# Patient Record
Sex: MALE | Race: WHITE | Employment: OTHER | URBAN - METROPOLITAN AREA
[De-identification: names, ages, dates, MRNs, and addresses within clinical notes are randomized per-mention and may not be internally consistent; named-entity substitution may affect disease eponyms.]

---

## 2017-02-27 RX ORDER — EZETIMIBE 10 MG
TABLET ORAL
Qty: 30 TAB | Refills: 2 | Status: SHIPPED | OUTPATIENT
Start: 2017-02-27 | End: 2017-05-29 | Stop reason: SDUPTHER

## 2017-03-28 RX ORDER — NITROGLYCERIN 40 MG/1
1 PATCH TRANSDERMAL DAILY
Qty: 30 PATCH | Refills: 3 | Status: SHIPPED | OUTPATIENT
Start: 2017-03-28 | End: 2017-08-01 | Stop reason: SDUPTHER

## 2017-05-30 RX ORDER — EZETIMIBE 10 MG/1
TABLET ORAL
Qty: 30 TAB | Refills: 2 | Status: SHIPPED | OUTPATIENT
Start: 2017-05-30 | End: 2017-08-23 | Stop reason: SDUPTHER

## 2017-06-20 ENCOUNTER — HOSPITAL ENCOUNTER (OUTPATIENT)
Dept: ULTRASOUND IMAGING | Age: 82
Discharge: HOME | End: 2017-06-20
Attending: PHYSICIAN ASSISTANT
Payer: MEDICARE

## 2017-06-20 DIAGNOSIS — M79.89 SWELLING OF LIMB: ICD-10-CM

## 2017-06-20 PROCEDURE — 93971 EXTREMITY STUDY: CPT

## 2017-07-31 ENCOUNTER — TELEPHONE (OUTPATIENT)
Dept: CARDIOLOGY CLINIC | Age: 82
End: 2017-07-31

## 2017-07-31 NOTE — TELEPHONE ENCOUNTER
Spoke to patient using 2 patient identifiers. Pt called to request refill for nitro patch. He was last seen on 2/29/17. Will ask  to call pt for an appt, then refills.

## 2017-08-01 RX ORDER — NITROGLYCERIN 40 MG/1
1 PATCH TRANSDERMAL DAILY
Qty: 30 PATCH | Refills: 0 | Status: SHIPPED | OUTPATIENT
Start: 2017-08-01 | End: 2017-08-30 | Stop reason: SDUPTHER

## 2017-08-21 ENCOUNTER — OFFICE VISIT (OUTPATIENT)
Dept: CARDIOLOGY CLINIC | Age: 82
End: 2017-08-21

## 2017-08-21 VITALS
RESPIRATION RATE: 16 BRPM | DIASTOLIC BLOOD PRESSURE: 80 MMHG | WEIGHT: 173.2 LBS | OXYGEN SATURATION: 97 % | HEIGHT: 62 IN | BODY MASS INDEX: 31.87 KG/M2 | HEART RATE: 64 BPM | SYSTOLIC BLOOD PRESSURE: 130 MMHG

## 2017-08-21 DIAGNOSIS — I25.119 ATHEROSCLEROSIS OF NATIVE CORONARY ARTERY OF NATIVE HEART WITH ANGINA PECTORIS (HCC): ICD-10-CM

## 2017-08-21 DIAGNOSIS — I10 ESSENTIAL HYPERTENSION, BENIGN: ICD-10-CM

## 2017-08-21 DIAGNOSIS — E78.2 MIXED HYPERLIPIDEMIA: Primary | ICD-10-CM

## 2017-08-21 NOTE — PROGRESS NOTES
NAME:  Robe Marrero. :   1929   MRN:   [id number]   PCP:  Marisa Tiwari MD           Subjective: The patient is a 80y.o. year old male  who returns for a routine follow-upon CAD . Since the last visit, patient reports no change in exercise tolerance, chest pain, edema, medication intolerance, palpitations, shortness of breath, PND/orthopnea wheezing, sputum, syncope, dizziness or light headedness. Doing well cardiac wise. Admits to feeling \"hot sensation\" at any time. Past Medical History:   Diagnosis Date    Arthritis     SHOULDERS AND SPINE    CAD (coronary artery disease) 1998    MI    Cancer [facility])     chronic lymphocytic leukemia    Cancer ([facility]     multiple skin cancers     Chest pain, unspecified     Chronic pain     L 2 LYTIC LESION    Edema     Hypertension     MI (myocardial infarction) ([facility]     Postsurgical percutaneous transluminal coronary angioplasty status 2012    Pre-operative cardiovascular examination     Thyroid disease        Social History   Substance Use Topics    Smoking status: Former Smoker     Packs/day: 0.50     Years: 15.00     Quit date: 1974    Smokeless tobacco: Never Used    Alcohol use Yes      Comment: OCC. Family History   Problem Relation Age of Onset    Diabetes Mother     Heart Disease Father     Heart Attack Father 80    Lung Disease Sister      PULMONARY FIBROSIS    Heart Attack Brother     No Known Problems Sister     Anesth Problems Neg Hx         Review of Systems  Constitutional: Negative for fever, chills, and diaphoresis. Respiratory: Negative for cough, hemoptysis, sputum production, shortness of breath and wheezing. Cardiovascular: Negative for chest pain, palpitations, orthopnea, claudication, leg swelling and PND. Gastrointestinal: Negative for heartburn, nausea, vomiting, blood in stool and melena. Genitourinary: Negative for dysuria and flank pain.    Musculoskeletal: Negative for joint pain and back pain. Skin: Negative for rash. Neurological: Negative for focal weakness, seizures, loss of consciousness, weakness and headaches. Endo/Heme/Allergies: Does not bruise/bleed easily. Psychiatric/Behavioral: Negative for memory loss. The patient does not have insomnia. Objective:       Vitals:    08/21/17 1426 08/21/17 1427   BP: 140/70 130/80   Pulse: 64    Resp: 16    SpO2: 97%    Weight: 173 lb 3.2 oz (78.6 kg)    Height: 5' 2\" (1.575 m)     Body mass index is 31.68 kg/(m^2). General PE    Gen: NAD     Mental Status - Alert. General Appearance - Not in acute distress. Neck - no JVD     Chest and Lung Exam     Inspection: Accessory muscles - No use of accessory muscles in breathing. Auscultation:   Breath sounds: - Normal.     Cardiovascular   Inspection: Jugular vein - Bilateral - Inspection Normal.   Palpation/Percussion:   Apical Impulse: - Normal.   Auscultation: Rhythm - Regular. Heart Sounds - S1 WNL and S2 WNL. No S3 or S4. Murmurs & Other Heart Sounds: Auscultation of the heart reveals - No Murmurs. Peripheral Vascular   Upper Extremity: Inspection - Bilateral - No Cyanotic nailbeds or Digital clubbing. Lower Extremity:   Palpation: Edema - Bilateral - No edema. Abdomen: Soft, non-tender, bowel sounds are active. Neuro: A&O times 3, CN and motor grossly WNL      Data Review:     EKG -  Sinus  Rhythm   -Poor R-wave progression -nonspecific unchanged from previous. Allergies reviewed  Allergies   Allergen Reactions    Statins-Hmg-Coa Reductase Inhibitors Myalgia and Other (comments)     Causes aching, generalized       Medications reviewed  Current Outpatient Prescriptions   Medication Sig    nitroglycerin (NITRODUR) 0.2 mg/hr 1 Patch by TransDERmal route daily.  ezetimibe (ZETIA) 10 mg tablet TAKE ONE TABLET BY MOUTH AT NIGHT    bacitracin (BACITRACIN) ointment Apply  to affected area two (2) times a day.     amLODIPine (NORVASC) 2.5 mg tablet Take 2.5 mg by mouth daily.  lisinopril (PRINIVIL, ZESTRIL) 10 mg tablet Take 10 mg by mouth daily.  cholecalciferol (VITAMIN D3) 1,000 unit tablet Take 1,000 Units by mouth daily.  timolol (TIMOPTIC) 0.5 % ophthalmic solution Administer 1 Drop to both eyes two (2) times a day.  HYDROcodone-acetaminophen (NORCO) 5-325 mg per tablet Take 1 Tab by mouth every six (6) hours as needed for Pain.  bimatoprost (LUMIGAN) 0.03 % ophthalmic drops Administer 1 Drop to both eyes nightly.  acetaminophen (TYLENOL EXTRA STRENGTH) 500 mg tablet Take  by mouth every six (6) hours as needed for Pain.  brinzolamide-brimonidine (SIMBRINZA) 1-0.2 % drps Apply 1 Drop to eye two (2) times a day.  finasteride (PROSCAR) 5 mg tablet Take 5 mg by mouth daily.  silodosin (RAPAFLO) 8 mg capsule Take 8 mg by mouth nightly.  levothyroxine (SYNTHROID) 25 mcg tablet Take 25 mcg by mouth Daily (before breakfast).  magnesium oxide (MAG-OXIDE) 400 mg tablet Take 400 mg by mouth daily (with breakfast).  aspirin delayed-release 81 mg tablet Take 81 mg by mouth nightly.  omega-3 fatty acids-vitamin e (FISH OIL) 1,000 mg cap Take 1 Cap by mouth two (2) times a day.  co-enzyme Q-10 (CO Q-10) 100 mg capsule Take 100 mg by mouth daily. No current facility-administered medications for this visit. Assessment:       ICD-10-CM ICD-9-CM    1. Mixed hyperlipidemia E78.2 272.2 AMB POC EKG ROUTINE W/ 12 LEADS, INTER & REP      METABOLIC PANEL, COMPREHENSIVE      LIPID PANEL      CK   2. Essential hypertension, benign A86 385.8 METABOLIC PANEL, COMPREHENSIVE      LIPID PANEL      CK   3.  Atherosclerosis of native coronary artery of native heart with angina pectoris ([facility] F53.710 168.76 METABOLIC PANEL, COMPREHENSIVE     413.9 LIPID PANEL      CK        Orders Placed This Encounter    METABOLIC PANEL, COMPREHENSIVE    LIPID PANEL    CK    AMB POC EKG ROUTINE W/ 12 LEADS, INTER & REP     Order Specific Question:   Reason for Exam:     Answer:   routine       Patient Active Problem List   Diagnosis Code    Postsurgical percutaneous transluminal coronary angioplasty status Z98.61    Mixed hyperlipidemia E78.2    Essential hypertension, benign I10    Coronary atherosclerosis of native coronary artery I25.10    Pneumonia, organism unspecified J18.9    Temporal arteritis ([facility] M31.6       Plan:     Patient presents for follow up, feeling well and stable from cardiac standpoint. Continue current care and follow up in 6 mo.     Wilbur Blizzard, MD

## 2017-08-21 NOTE — MR AVS SNAPSHOT
Visit Information Date & Time Provider Department Dept. Phone Encounter #  
 8/21/2017  2:15 PM Scar Montana, [facility] [id number] Upcoming Health Maintenance Date Due DTaP/Tdap/Td series (1 - Tdap) 1/11/1950 ZOSTER VACCINE AGE 60> 11/11/1988 GLAUCOMA SCREENING Q2Y 1/11/1994 MEDICARE YEARLY EXAM 1/11/1994 Pneumococcal 65+ Low/Medium Risk (2 of 2 - PPSV23) 1/1/2008 INFLUENZA AGE 9 TO ADULT 8/1/2017 Allergies as of 8/21/2017  Review Complete On: 8/21/2017 By: Kasey Villareal LPN Severity Noted Reaction Type Reactions Statins-hmg-coa Reductase Inhibitors Medium 04/15/2010   Side Effect Myalgia, Other (comments) Causes aching, generalized Current Immunizations  Reviewed on 3/27/2013 Name Date H1N1 FLU VACCINE 11/2/2009 Influenza Vaccine  Deferred (Patient Refused) Influenza Vaccine Whole 12/2/2009 Pneumococcal Vaccine (Unspecified Type) 1/1/2003 Not reviewed this visit You Were Diagnosed With   
  
 Codes Comments Mixed hyperlipidemia    -  Primary ICD-10-CM: Q64.6 ICD-9-CM: 272.2 Essential hypertension, benign     ICD-10-CM: I10 
ICD-9-CM: 401.1 Atherosclerosis of native coronary artery of native heart with angina pectoris ([facility]     ICD-10-CM: I25.119 ICD-9-CM: 414.01, 413.9 Vitals BP Pulse Resp Height(growth percentile) Weight(growth percentile) SpO2  
 130/80 (BP 1 Location: Right arm, BP Patient Position: Sitting) 64 16 5' 2\" (1.575 m) 173 lb 3.2 oz (78.6 kg) 97% BMI Smoking Status 31.68 kg/m2 Former Smoker Vitals History BMI and BSA Data Body Mass Index Body Surface Area  
 31.68 kg/m 2 1.85 m 2 Preferred Pharmacy Pharmacy Name Phone [facility] [address] [facility] - [address] , [place] [address] [phone] Your Updated Medication List  
  
   
 This list is accurate as of: 8/21/17  2:56 PM.  Always use your most recent med list. amLODIPine 2.5 mg tablet Commonly known as:  Fely Boothe Take 2.5 mg by mouth daily. aspirin delayed-release 81 mg tablet Take 81 mg by mouth nightly. bacitracin zinc ointment Commonly known as:  BACITRACIN Apply  to affected area two (2) times a day. co-enzyme Q-10 100 mg capsule Commonly known as:  CO Q-10 Take 100 mg by mouth daily. ezetimibe 10 mg tablet Commonly known as:  Corinda [place] TAKE ONE TABLET BY MOUTH AT NIGHT  
  
 finasteride 5 mg tablet Commonly known as:  PROSCAR Take 5 mg by mouth daily. FISH OIL 1,000 mg Cap Generic drug:  omega-3 fatty acids-vitamin e Take 1 Cap by mouth two (2) times a day. levothyroxine 25 mcg tablet Commonly known as:  SYNTHROID Take 25 mcg by mouth Daily (before breakfast). lisinopril 10 mg tablet Commonly known as:  Mando Luisito Take 10 mg by mouth daily. LUMIGAN 0.03 % ophthalmic drops Generic drug:  bimatoprost  
Administer 1 Drop to both eyes nightly. MAG-OXIDE 400 mg tablet Generic drug:  magnesium oxide Take 400 mg by mouth daily (with breakfast). nitroglycerin 0.2 mg/hr Commonly known as:  NITRODUR  
1 Patch by TransDERmal route daily. NORCO 5-325 mg per tablet Generic drug:  HYDROcodone-acetaminophen Take 1 Tab by mouth every six (6) hours as needed for Pain. RAPAFLO 8 mg capsule Generic drug:  silodosin Take 8 mg by mouth nightly. SIMBRINZA 1-0.2 % Drps Generic drug:  brinzolamide-brimonidine Apply 1 Drop to eye two (2) times a day. timolol 0.5 % ophthalmic solution Commonly known as:  TIMOPTIC Administer 1 Drop to both eyes two (2) times a day. TYLENOL EXTRA STRENGTH 500 mg tablet Generic drug:  acetaminophen Take  by mouth every six (6) hours as needed for Pain. VITAMIN D3 1,000 unit tablet Generic drug:  cholecalciferol Take 1,000 Units by mouth daily. We Performed the Following AMB POC EKG ROUTINE W/ 12 LEADS, INTER & REP [16740 CPT(R)] CK [id number] CPT(R)] LIPID PANEL [82859 CPT(R)] METABOLIC PANEL, COMPREHENSIVE [65454 CPT(R)] Introducing [facility]! Natali Epstein introduces Pocket patient portal. Now you can access parts of your medical record, email your doctor's office, and request medication refills online. 1. In your internet browser, go to [email or web address] 2. Click on the First Time User? Click Here link in the Sign In box. You will see the New Member Sign Up page. 3. Enter your Pocket Access Code exactly as it appears below. You will not need to use this code after youve completed the sign-up process. If you do not sign up before the expiration date, you must request a new code. · Pocket Access Code: [email or web address] Expires: 9/18/2017 12:36 PM 
 
4. Enter the last four digits of your Social Security Number (xxxx) and Date of Birth (mm/dd/yyyy) as indicated and click Submit. You will be taken to the next sign-up page. 5. Create a Pocket ID. This will be your Pocket login ID and cannot be changed, so think of one that is secure and easy to remember. 6. Create a Pocket password. You can change your password at any time. 7. Enter your Password Reset Question and Answer. This can be used at a later time if you forget your password. 8. Enter your e-mail address. You will receive e-mail notification when new information is available in [address] 9. Click Sign Up. You can now view and download portions of your medical record. 10. Click the Download Summary menu link to download a portable copy of your medical information. If you have questions, please visit the Frequently Asked Questions section of the Pocket website. Remember, Pocket is NOT to be used for urgent needs. For medical emergencies, dial 911. Now available from your iPhone and Android! Please provide this summary of care documentation to your next provider. Your primary care clinician is listed as London Davis. If you have any questions after today's visit, please call [phone].

## 2017-08-23 ENCOUNTER — HOSPITAL ENCOUNTER (OUTPATIENT)
Dept: LAB | Age: 82
Discharge: HOME | End: 2017-08-23
Payer: MEDICARE

## 2017-08-23 PROCEDURE — 36415 COLL VENOUS BLD VENIPUNCTURE: CPT

## 2017-08-23 PROCEDURE — 82550 ASSAY OF CK (CPK): CPT

## 2017-08-23 PROCEDURE — 80061 LIPID PANEL: CPT

## 2017-08-23 PROCEDURE — 80053 COMPREHEN METABOLIC PANEL: CPT

## 2017-08-23 RX ORDER — EZETIMIBE 10 MG/1
TABLET ORAL
Qty: 30 TAB | Refills: 2 | Status: SHIPPED | OUTPATIENT
Start: 2017-08-23 | End: 2017-10-23 | Stop reason: SDUPTHER

## 2017-08-24 LAB
ALBUMIN SERPL-MCNC: 3.9 G/DL (ref 3.5–4.7)
ALBUMIN/GLOB SERPL: 2.2 {RATIO} (ref 1.2–2.2)
ALP SERPL-CCNC: 80 IU/L (ref 39–117)
ALT SERPL-CCNC: 10 IU/L (ref 0–44)
AST SERPL-CCNC: 22 IU/L (ref 0–40)
BILIRUB SERPL-MCNC: 0.5 MG/DL (ref 0–1.2)
BUN SERPL-MCNC: 19 MG/DL (ref 8–27)
BUN/CREAT SERPL: 21 (ref 10–24)
CALCIUM SERPL-MCNC: 9 MG/DL (ref 8.6–10.2)
CHLORIDE SERPL-SCNC: 105 MMOL/L (ref 96–106)
CHOLEST SERPL-MCNC: 150 MG/DL (ref 100–199)
CK SERPL-CCNC: 84 U/L (ref 24–204)
CO2 SERPL-SCNC: 21 MMOL/L (ref 18–29)
CREAT SERPL-MCNC: 0.92 MG/DL (ref 0.76–1.27)
GLOBULIN SER CALC-MCNC: 1.8 G/DL (ref 1.5–4.5)
GLUCOSE SERPL-MCNC: 91 MG/DL (ref 65–99)
HDLC SERPL-MCNC: 39 MG/DL
INTERPRETATION, 910389: NORMAL
LDLC SERPL CALC-MCNC: 87 MG/DL (ref 0–99)
POTASSIUM SERPL-SCNC: 4.5 MMOL/L (ref 3.5–5.2)
PROT SERPL-MCNC: 5.7 G/DL (ref 6–8.5)
SODIUM SERPL-SCNC: 141 MMOL/L (ref 134–144)
TRIGL SERPL-MCNC: 119 MG/DL (ref 0–149)
VLDLC SERPL CALC-MCNC: 24 MG/DL (ref 5–40)

## 2017-08-24 NOTE — PROGRESS NOTES
Spoke to patient using 2 patient identifiers.   Per Arianna Pagan NP, labs are normal.  Will mail a copy of lab results per pt request.

## 2017-10-23 RX ORDER — EZETIMIBE 10 MG/1
TABLET ORAL
Qty: 90 TAB | Refills: 1 | Status: SHIPPED | OUTPATIENT
Start: 2017-10-23 | End: 2018-06-04 | Stop reason: SDUPTHER

## 2018-02-21 ENCOUNTER — OFFICE VISIT (OUTPATIENT)
Dept: CARDIOLOGY CLINIC | Age: 83
End: 2018-02-21

## 2018-02-21 VITALS
HEART RATE: 56 BPM | OXYGEN SATURATION: 93 % | WEIGHT: 172.5 LBS | DIASTOLIC BLOOD PRESSURE: 86 MMHG | RESPIRATION RATE: 18 BRPM | BODY MASS INDEX: 31.74 KG/M2 | SYSTOLIC BLOOD PRESSURE: 164 MMHG | HEIGHT: 62 IN

## 2018-02-21 DIAGNOSIS — Z98.61 POSTSURGICAL PERCUTANEOUS TRANSLUMINAL CORONARY ANGIOPLASTY STATUS: ICD-10-CM

## 2018-02-21 DIAGNOSIS — I25.119 ATHEROSCLEROSIS OF NATIVE CORONARY ARTERY OF NATIVE HEART WITH ANGINA PECTORIS (HCC): ICD-10-CM

## 2018-02-21 DIAGNOSIS — I10 ESSENTIAL HYPERTENSION, BENIGN: Primary | ICD-10-CM

## 2018-02-21 DIAGNOSIS — E78.2 MIXED HYPERLIPIDEMIA: ICD-10-CM

## 2018-02-21 NOTE — PROGRESS NOTES
NAME:  Padilla Evans   :   1929   MRN:   [id number]   PCP:  Ken Kimble MD           Subjective: The patient is a 80y.o. year old male  who returns for a routine follow-up on ASHD, HTN. Since the last visit, patient reports no change in exercise tolerance, chest pain, edema, medication intolerance, palpitations, shortness of breath, PND/orthopnea wheezing, sputum, syncope, dizziness or light headedness. Some chest fullness at any time. Continues to experience a \"burning sensation\" in his body as he wakes. Wears NTG patch at night as he forgets to wear it during the day. Had meds at 7 am today. Past Medical History:   Diagnosis Date    Arthritis     SHOULDERS AND SPINE    CAD (coronary artery disease) 1998    MI    Cancer [facility])     chronic lymphocytic leukemia    Cancer ([facility] [address]     multiple skin cancers     Chest pain, unspecified     Chronic pain     L 2 LYTIC LESION    Edema     Hypertension     MI (myocardial infarction)     Postsurgical percutaneous transluminal coronary angioplasty status 2012    Pre-operative cardiovascular examination     Thyroid disease        Social History   Substance Use Topics    Smoking status: Former Smoker     Packs/day: 0.50     Years: 15.00     Quit date: 1974    Smokeless tobacco: Never Used    Alcohol use Yes      Comment: OCC. Family History   Problem Relation Age of Onset    Diabetes Mother     Heart Disease Father     Heart Attack Father 80    Lung Disease Sister      PULMONARY FIBROSIS    Heart Attack Brother     No Known Problems Sister     Anesth Problems Neg Hx         Review of Systems  Constitutional: Negative for fever, chills, and diaphoresis. Respiratory: Negative for cough, hemoptysis, sputum production, shortness of breath and wheezing. Cardiovascular: Negative for chest pain, palpitations, orthopnea, claudication, leg swelling and PND.    Gastrointestinal: Negative for heartburn, nausea, vomiting, blood in stool and melena. Genitourinary: Negative for dysuria and flank pain. Musculoskeletal: Negative for joint pain and back pain. Skin: Negative for rash. Neurological: Negative for focal weakness, seizures, loss of consciousness, weakness and headaches. Endo/Heme/Allergies: Does not bruise/bleed easily. Psychiatric/Behavioral: Negative for memory loss. The patient does not have insomnia. Objective:       Vitals:    02/21/18 0927 02/21/18 0935   BP: 160/88 164/86   Pulse: (!) 56    Resp: 18    SpO2: 93%    Weight: 172 lb 8 oz (78.2 kg)    Height: 5' 2\" (1.575 m)     Body mass index is 31.55 kg/(m^2). General PE    Gen: NAD     Mental Status - Alert. General Appearance - Not in acute distress. Neck - no JVD     Chest and Lung Exam     Inspection: Accessory muscles - No use of accessory muscles in breathing. Auscultation:   Breath sounds: - Normal.     Cardiovascular   Inspection: Jugular vein - Bilateral - Inspection Normal.   Palpation/Percussion:   Apical Impulse: - Normal.   Auscultation: Rhythm - Regular. Heart Sounds - S1 WNL and S2 WNL. No S3 or S4. Murmurs & Other Heart Sounds: Auscultation of the heart reveals - No Murmurs. Peripheral Vascular   Upper Extremity: Inspection - Bilateral - No Cyanotic nailbeds or Digital clubbing. Lower Extremity:   Palpation: Edema - Bilateral - No edema. Abdomen: Soft, non-tender, bowel sounds are active. Neuro: A&O times 3, CN and motor grossly WNL      Data Review:     EKG -  Sinus bradycardia, ventricular rate 57      Allergies reviewed  Allergies   Allergen Reactions    Statins-Hmg-Coa Reductase Inhibitors Myalgia and Other (comments)     Causes aching, generalized       Medications reviewed  Current Outpatient Prescriptions   Medication Sig    ezetimibe (ZETIA) 10 mg tablet TAKE ONE TABLET BY MOUTH AT NIGHT    bacitracin (BACITRACIN) ointment Apply  to affected area two (2) times a day.     amLODIPine (NORVASC) 2.5 mg tablet Take 2.5 mg by mouth daily.  lisinopril (PRINIVIL, ZESTRIL) 10 mg tablet Take 10 mg by mouth daily.  aspirin delayed-release 81 mg tablet Take 81 mg by mouth nightly.  cholecalciferol (VITAMIN D3) 1,000 unit tablet Take 1,000 Units by mouth daily.  timolol (TIMOPTIC) 0.5 % ophthalmic solution Administer 1 Drop to both eyes two (2) times a day.  HYDROcodone-acetaminophen (NORCO) 5-325 mg per tablet Take 1 Tab by mouth every six (6) hours as needed for Pain.  bimatoprost (LUMIGAN) 0.03 % ophthalmic drops Administer 1 Drop to both eyes nightly.  acetaminophen (TYLENOL EXTRA STRENGTH) 500 mg tablet Take  by mouth every six (6) hours as needed for Pain.  brinzolamide-brimonidine (SIMBRINZA) 1-0.2 % drps Apply 1 Drop to eye two (2) times a day.  finasteride (PROSCAR) 5 mg tablet Take 5 mg by mouth daily.  silodosin (RAPAFLO) 8 mg capsule Take 8 mg by mouth nightly.  levothyroxine (SYNTHROID) 25 mcg tablet Take 25 mcg by mouth Daily (before breakfast).  magnesium oxide (MAG-OXIDE) 400 mg tablet Take 400 mg by mouth daily (with breakfast). No current facility-administered medications for this visit. Assessment:       ICD-10-CM ICD-9-CM    1. Essential hypertension, benign I10 401.1 AMB POC EKG ROUTINE W/ 12 LEADS, INTER & REP   2. Atherosclerosis of native coronary artery of native heart with angina pectoris ([facility] I25.119 414.01      413.9    3. Mixed hyperlipidemia E78.2 272.2    4.  Postsurgical percutaneous transluminal coronary angioplasty status Z98.61 V45.82         Orders Placed This Encounter    AMB POC EKG ROUTINE W/ 12 LEADS, INTER & REP     Order Specific Question:   Reason for Exam:     Answer:   routine       Patient Active Problem List   Diagnosis Code    Postsurgical percutaneous transluminal coronary angioplasty status Z98.61    Mixed hyperlipidemia E78.2    Essential hypertension, benign I10    Coronary atherosclerosis of native coronary artery I25.10    Pneumonia, organism unspecified(486) J18.9    Temporal arteritis ([facility] M31.6       Plan:     Patient presents for follow up, feeling well and stable from cardiac standpoint. Will stop NTG patch as it has not affected his symptoms. Encouraged to monitor bps at home as they are quite high today. Labs per PCP. Follow up in 6 mo.      Axel Cedeno MD

## 2018-02-21 NOTE — MR AVS SNAPSHOT
Tequila Guadalupe [facility] 
[phone] Patient: Rocky Hernandez MRN: [id number] [facility]:0/39/2819 Visit Information Date & Time Provider Department Dept. Phone Encounter #  
 2/21/2018  9:15 AM Kavon Sprague, [address] [place] [address] [facility] [phone] [id number] Follow-up Instructions Routing History Follow-up and Disposition History Your Appointments 8/29/2018  9:00 AM  
ESTABLISHED PATIENT with Kavon Sprague MD  
[facility] [facility]) Appt Note: Per Dr. Orvis Primrose, see in 6 months-scc [address] [facility]  
[phone] [address] [facility] Upcoming Health Maintenance Date Due DTaP/Tdap/Td series (1 - Tdap) 1/11/1950 ZOSTER VACCINE AGE 60> 11/11/1988 GLAUCOMA SCREENING Q2Y 1/11/1994 MEDICARE YEARLY EXAM 1/11/1994 Pneumococcal 65+ Low/Medium Risk (2 of 2 - PPSV23) 1/1/2008 Influenza Age 5 to Adult 8/1/2017 Allergies as of 2/21/2018  Review Complete On: 2/21/2018 By: Kavon Sprague MD  
  
 Severity Noted Reaction Type Reactions Statins-hmg-coa Reductase Inhibitors Medium 04/15/2010   Side Effect Myalgia, Other (comments) Causes aching, generalized Current Immunizations  Reviewed on 3/27/2013 Name Date H1N1 FLU VACCINE 11/2/2009 Influenza Vaccine  Deferred (Patient Refused) Influenza Vaccine Whole 12/2/2009 Pneumococcal Vaccine (Unspecified Type) 1/1/2003 Not reviewed this visit You Were Diagnosed With   
  
 Codes Comments Essential hypertension, benign    -  Primary ICD-10-CM: I10 
ICD-9-CM: 401.1 Atherosclerosis of native coronary artery of native heart with angina pectoris ([facility] [address] [address]     ICD-10-CM: I25.119 ICD-9-CM: 414.01, 413.9 Mixed hyperlipidemia     ICD-10-CM: E78.2 ICD-9-CM: 272.2 Postsurgical percutaneous transluminal coronary angioplasty status     ICD-10-CM: Z98.61 ICD-9-CM: V45.82 Vitals BP Pulse Resp Height(growth percentile) Weight(growth percentile) SpO2  
 164/86 (BP 1 Location: Right arm, BP Patient Position: Sitting) (!) 56 18 5' 2\" (1.575 m) 172 lb 8 oz (78.2 kg) 93% BMI Smoking Status 31.55 kg/m2 Former Smoker Vitals History BMI and BSA Data Body Mass Index Body Surface Area 31.55 kg/m 2 1.85 m 2 Preferred Pharmacy Pharm[facility] Name Phone [facility] [address] [facility] [address] [facility], [place] [address] [phone] Your Updated Medication List  
  
   
This list is accurate as of 2/21/18 10:34 AM.  Always use your most recent med list. amLODIPine 2.5 mg tablet Commonly known as:  Lynnell Manual Take 2.5 mg by mouth daily. aspirin delayed-release 81 mg tablet Take 81 mg by mouth nightly. bacitracin zinc ointment Commonly known as:  BACITRACIN Apply  to affected area two (2) times a day.  
  
 ezetimibe 10 mg tablet Commonly known as:  Verlin Nael TAKE ONE TABLET BY MOUTH AT NIGHT  
  
 finasteride 5 mg tablet Commonly known as:  PROSCAR Take 5 mg by mouth daily. levothyroxine 25 mcg tablet Commonly known as:  SYNTHROID Take 25 mcg by mouth Daily (before breakfast). lisinopril 10 mg tablet Commonly known as:  Marissa Espinoza Take 10 mg by mouth daily. LUMIGAN 0.03 % ophthalmic drops Generic drug:  bimatoprost  
Administer 1 Drop to both eyes nightly. MAG-OXIDE 400 mg tablet Generic drug:  magnesium oxide Take 400 mg by mouth daily (with breakfast). NORCO 5-325 mg per tablet Generic drug:  HYDROcodone-acetaminophen Take 1 Tab by mouth every six (6) hours as needed for Pain. RAPAFLO 8 mg capsule Generic drug:  silodosin Take 8 mg by mouth nightly. SIMBRINZA 1-0.2 % Drps Generic drug:  brinzolamide-brimonidine Apply 1 Drop to eye two (2) times a day. timolol 0.5 % ophthalmic solution Commonly known as:  TIMOPTIC Administer 1 Drop to both eyes two (2) times a day. TYLENOL EXTRA STRENGTH 500 mg tablet Generic drug:  acetaminophen Take  by mouth every six (6) hours as needed for Pain. VITAMIN D3 1,000 unit tablet Generic drug:  cholecalciferol Take 1,000 Units by mouth daily. We Performed the Following AMB POC EKG ROUTINE W/ 12 LEADS, INTER & REP [31572 CPT(R)] Introducing [facility] & HEALTH SERVICES! Georgiarajinder Olivia introduces [employer] patient portal. Now you can access parts of your medical record, email your doctor's office, and request medication refills online. 1. In your internet browser, go to [email or web address] 2. Click on the First Time User? Click Here link in the Sign In box. You will see the New Member Sign Up page. 3. Enter your [employer] Access Code exactly as it appears below. You will not need to use this code after youve completed the sign-up process. If you do not sign up before the expiration date, you must request a new code. · [employer] Access Code: [email or web address] Expires: 5/22/2018 10:32 AM 
 
4. Enter the last four digits of your Social Security Number (xxxx) and Date of Birth (mm/dd/yyyy) as indicated and click Submit. You will be taken to the next sign-up page. 5. Create a AudioEye ID. This will be your [employer] login ID and cannot be changed, so think of one that is secure and easy to remember. 6. Create a [employer] password. You can change your password at any time. 7. Enter your Password Reset Question and Answer. This can be used at a later time if you forget your password. 8. Enter your e-mail address. You will receive e-mail notification when new information is available in [address] 9. Click Sign Up. You can now view and download portions of your medical record. 10. Click the Download Summary menu link to download a portable copy of your medical information. If you have questions, please visit the Frequently Asked Questions section of the Web[employer] website. Remember, [employer] is NOT to be used for urgent needs. For medical emergencies, dial 911. Now available from your iPhone and Android! Please provide this summary of care documentation to your next provider. Your primary care clinician is listed as Benny Molina. If you have any questions after today's visit, please call [phone].

## 2018-02-21 NOTE — PROGRESS NOTES
1. Have you been to the ER, urgent care clinic since your last visit? Hospitalized since your last visit? No    2. Have you seen or consulted any other health care providers outside of the [address] [place] [address] since your last visit? Include any pap smears or colon screening. No    Chief Complaint   Patient presents with    Hypertension     6 mo appt. Denied cardiac symptoms.

## 2018-05-11 ENCOUNTER — OFFICE VISIT (OUTPATIENT)
Dept: CARDIOLOGY CLINIC | Age: 83
End: 2018-05-11

## 2018-05-11 VITALS
RESPIRATION RATE: 16 BRPM | HEIGHT: 62 IN | HEART RATE: 54 BPM | DIASTOLIC BLOOD PRESSURE: 56 MMHG | SYSTOLIC BLOOD PRESSURE: 110 MMHG | OXYGEN SATURATION: 96 % | BODY MASS INDEX: 31.58 KG/M2 | WEIGHT: 171.6 LBS

## 2018-05-11 DIAGNOSIS — I10 ESSENTIAL HYPERTENSION, BENIGN: ICD-10-CM

## 2018-05-11 DIAGNOSIS — R07.9 CHEST PAIN WITH MODERATE RISK FOR CARDIAC ETIOLOGY: Primary | ICD-10-CM

## 2018-05-11 DIAGNOSIS — I25.119 ATHEROSCLEROSIS OF NATIVE CORONARY ARTERY OF NATIVE HEART WITH ANGINA PECTORIS (HCC): ICD-10-CM

## 2018-05-11 DIAGNOSIS — E78.2 MIXED HYPERLIPIDEMIA: ICD-10-CM

## 2018-05-11 RX ORDER — ISOSORBIDE MONONITRATE 20 MG/1
20 TABLET ORAL 2 TIMES DAILY
COMMUNITY
Start: 2018-05-05 | End: 2018-06-06 | Stop reason: SDUPTHER

## 2018-05-11 RX ORDER — CEPHALEXIN 500 MG/1
CAPSULE ORAL
Refills: 0 | COMMUNITY
Start: 2018-04-25 | End: 2018-05-11 | Stop reason: ALTCHOICE

## 2018-05-11 RX ORDER — NITROGLYCERIN 0.4 MG/1
0.4 TABLET SUBLINGUAL
Qty: 1 BOTTLE | Refills: 1 | Status: SHIPPED | OUTPATIENT
Start: 2018-05-11

## 2018-05-11 RX ORDER — NITROGLYCERIN 40 MG/1
PATCH TRANSDERMAL
Refills: 6 | COMMUNITY
Start: 2018-02-20 | End: 2018-05-11 | Stop reason: ALTCHOICE

## 2018-05-11 NOTE — MR AVS SNAPSHOT
[address] [facility] [facility] [employer] 
[phone] Patient: Susanna Parisi MRN: [id number] DBI:[birth year]95[birth year] Visit Information Date & Time Provider Department Dept. Phone Encounter #  
 5/11/2018 11:00 AM Bob Gillespie, [address] [place] [address] [facility] [phone] [id number] Your Appointments 8/29/2018  9:00 AM  
ESTABLISHED PATIENT with MD Na[facility]) Appt Note: Per Dr. Anand Galvez, see in 6 months-scc [address]  
[phone] [address] Upcoming Health Maintenance Date Due DTaP/Tdap/Td series (1 - Tdap) 1/11/1950 ZOSTER VACCINE AGE 60> 11/11/1988 GLAUCOMA SCREENING Q2Y 1/11/1994 Pneumococcal 65+ Low/Medium Risk (2 of 2 - PPSV23) 1/1/2008 MEDICARE YEARLY EXAM 3/14/2018 Influenza Age 5 to Adult 8/1/2018 Allergies as of 5/11/2018  Review Complete On: 5/11/2018 By: Alan January Severity Noted Reaction Type Reactions Statins-hmg-coa Reductase Inhibitors Medium 04/15/2010   Side Effect Myalgia, Other (comments) Causes aching, generalized Current Immunizations  Reviewed on 3/27/2013 Name Date H1N1 FLU VACCINE 11/2/2009 Influenza Vaccine  Deferred (Patient Refused) Influenza Vaccine Whole 12/2/2009 Pneumococcal Vaccine (Unspecified Type) 1/1/2003 Not reviewed this visit You Were Diagnosed With   
  
 Codes Comments Chest pain with moderate risk for cardiac etiology    -  Primary ICD-10-CM: R07.9 ICD-9-CM: 786.50 Mixed hyperlipidemia     ICD-10-CM: E78.2 ICD-9-CM: 272.2 Essential hypertension, benign     ICD-10-CM: I10 
ICD-9-CM: 401.1 Atherosclerosis of native coronary artery of native heart with angina pectoris ([facility] [address]     ICD-10-CM: I25.119 ICD-9-CM: 414.01, 413.9 Vitals BP Pulse Resp Height(growth percentile) Weight(growth percentile) SpO2  
 110/56 (BP 1 Location: Right arm, BP Patient Position: Sitting) (!) 54 16 5' 2\" (1.575 m) 171 lb 9.6 oz (77.8 kg) 96% BMI Smoking Status 31.39 kg/m2 Former Smoker Vitals History BMI and BSA Data Body Mass Index Body Surface Area  
 31.39 kg/m 2 1.84 m 2 Preferred Pharmacy [facility] Name [facility]  [address] [facility] - [address], [place] [address] [phone] Your Updated Medication List  
  
   
This list is accurate as of 5/11/18 11:59 AM.  Always use your most recent med list. amLODIPine 2.5 mg tablet Commonly known as:  Sundra Orlando Take 2.5 mg by mouth daily. aspirin delayed-release 81 mg tablet Take 81 mg by mouth nightly. bacitracin zinc ointment Commonly known as:  BACITRACIN Apply  to affected area two (2) times a day.  
  
 ezetimibe 10 mg tablet Commonly known as:  Berle Poncho TAKE ONE TABLET BY MOUTH AT NIGHT  
  
 finasteride 5 mg tablet Commonly known as:  PROSCAR Take 5 mg by mouth daily. isosorbide mononitrate 20 mg tablet Commonly known as:  ISMO, MONOKET Take 20 mg by mouth two (2) times a day. levothyroxine 25 mcg tablet Commonly known as:  SYNTHROID Take 25 mcg by mouth Daily (before breakfast). lisinopril 10 mg tablet Commonly known as:  Pecola Cypher Take 10 mg by mouth daily. LUMIGAN 0.03 % ophthalmic drops Generic drug:  bimatoprost  
Administer 1 Drop to both eyes nightly. MAG-OXIDE 400 mg tablet Generic drug:  magnesium oxide Take 400 mg by mouth daily (with breakfast). nitroglycerin 0.4 mg SL tablet Commonly known as:  NITROSTAT  
1 Tab by SubLINGual route every five (5) minutes as needed for Chest Pain (max 3 tabs daily for chest pain). Up to 3 doses. NORCO 5-325 mg per tablet Generic drug:  HYDROcodone-acetaminophen Take 1 Tab by mouth every six (6) hours as needed for Pain. RAPAFLO 8 mg capsule Generic drug:  silodosin Take 8 mg by mouth nightly. SIMBRINZA 1-0.2 % Drps Generic drug:  brinzolamide-brimonidine Apply 1 Drop to eye two (2) times a day. timolol 0.5 % ophthalmic solution Commonly known as:  TIMOPTIC Administer 1 Drop to both eyes two (2) times a day. TYLENOL EXTRA STRENGTH 500 mg tablet Generic drug:  acetaminophen Take  by mouth every six (6) hours as needed for Pain. VITAMIN D3 1,000 unit tablet Generic drug:  cholecalciferol Take 1,000 Units by mouth daily. Prescriptions Sent to Pharmacy Refills  
 nitroglycerin (NITROSTAT) 0.4 mg SL tablet 1 Si Tab by SubLINGual route every five (5) minutes as needed for Chest Pain (max 3 tabs daily for chest pain). Up to 3 doses. Class: Normal  
 Pharmacy: [place] [id number] Ph #: [phone] Route: SubLINGual  
  
We Performed the Following AMB POC EKG ROUTINE W/ 12 LEADS, INTER & REP [77612 CPT(R)] To-Do List   
 05/15/2018 ECG:  STRESS TEST LEXISCAN/CARDIOLITE Introducing [facility] & HEALTH SERVICES! Deanne Weller introduces [employer] patient portal. Now you can access parts of your medical record, email your doctor's office, and request medication refills online. 1. In your internet browser, go to [email or web address] 2. Click on the First Time User? Click Here link in the Sign In box. You will see the New Member Sign Up page. 3. Enter your [employer] Access Code exactly as it appears below. You will not need to use this code after youve completed the sign-up process. If you do not sign up before the expiration date, you must request a new code. · [employer] Access Code: [email or web address] Expires: 2018 11:32 AM 
 
4.  Enter the last four digits of your Social Security Number (xxxx) and Date of Birth (mm/dd/yyyy) as indicated and click Submit. You will be taken to the next sign-up page. 5. Create a [employer] ID. This will be your [employer] login ID and cannot be changed, so think of one that is secure and easy to remember. 6. Create a [employer] password. You can change your password at any time. 7. Enter your Password Reset Question and Answer. This can be used at a later time if you forget your password. 8. Enter your e-mail address. You will receive e-mail notification when new information is available in [address] 9. Click Sign Up. You can now view and download portions of your medical record. 10. Click the Download Summary menu link to download a portable copy of your medical information. If you have questions, please visit the Frequently Asked Questions section of the [employer] website. Remember, [employer] is NOT to be used for urgent needs. For medical emergencies, dial 911. Now available from your iPhone and Android! Please provide this summary of care documentation to your next provider. Your primary care clinician is listed as Sheila Seals. If you have any questions after today's visit, please call [phone].

## 2018-05-11 NOTE — PROGRESS NOTES
1. Have you been to the ER, urgent care clinic since your last visit? Hospitalized since your last visit? Yes, at [facility] urgent care x 1 wk ago for high BP    2. Have you seen or consulted any other health care providers outside of the [address] [place] [address] since your last visit? Include any pap smears or colon screening. No    Chief Complaint   Patient presents with    Hypertension     follow up; was seen at [facility] urgent care x 1 wk ago    Cholesterol Problem     \"     Pt reports bp trends: 130s/upper 50s-60s in the last few days.

## 2018-05-11 NOTE — PROGRESS NOTES
Sheryl Adams DNP, ANP-BC  Subjective/HPI:     Kendrick Linn is a 80 y.o. male is here for emergency room follow-up where patient was seen May 5, 2018 at [facility] for hypertension and left anterior chest discomfort. Patient has brought a copy of the workup there is been no EKG changes, troponin was negative. He was placed on isosorbide. Since being placed on isosorbide he has not had any recurrent chest pain. His home blood pressure logs have been stable in a range of 130 mmHg, while he was in the emergency room his pressure was as high as 214/94.     PCP Provider  Coreen Streeter MD  Past Medical History:   Diagnosis Date    Arthritis     SHOULDERS AND SPINE    CAD (coronary artery disease) 7/1998    MI    Cancer ([address]     chronic lymphocytic leukemia    Cancer ([address]     multiple skin cancers     Chest pain, unspecified     Chronic pain     L 2 LYTIC LESION    Edema     Hypertension     MI (myocardial infarction) ([address]     Postsurgical percutaneous transluminal coronary angioplasty status 6/4/2012    Pre-operative cardiovascular examination     Thyroid disease     HYPO      Past Surgical History:   Procedure Laterality Date    CARDIAC SURG PROCEDURE UNLIST  7/1998    2 Cardiac stents    EGD  6/7/2010         HX ADENOIDECTOMY  1936    HX APPENDECTOMY  1954    HX CATARACT REMOVAL      bilateral    HX ORTHOPAEDIC  4/15/2008    Right hip replacement    HX OTHER SURGICAL  5/2011    Moh's procedure,     HX OTHER SURGICAL      MULTIPLES SKIN CANCER REMOVALS, MOSTLY ON HEAD    HX SKIN BIOPSY  04/27/2018    on scalp     HX TONSILLECTOMY  1936    1936     Allergies   Allergen Reactions    Statins-Hmg-Coa Reductase Inhibitors Myalgia and Other (comments)     Causes aching, generalized      Family History   Problem Relation Age of Onset    Diabetes Mother     Heart Disease Father     Heart Attack Father 80    Lung Disease Sister      PULMONARY FIBROSIS    Heart Attack Brother  No Known Problems Sister     Anesth Problems Neg Hx       Current Outpatient Prescriptions   Medication Sig    isosorbide mononitrate (ISMO, MONOKET) 20 mg tablet Take 20 mg by mouth two (2) times a day.  nitroglycerin (NITROSTAT) 0.4 mg SL tablet 1 Tab by SubLINGual route every five (5) minutes as needed for Chest Pain (max 3 tabs daily for chest pain). Up to 3 doses.  ezetimibe (ZETIA) 10 mg tablet TAKE ONE TABLET BY MOUTH AT NIGHT    amLODIPine (NORVASC) 2.5 mg tablet Take 2.5 mg by mouth daily.  lisinopril (PRINIVIL, ZESTRIL) 10 mg tablet Take 10 mg by mouth daily.  aspirin delayed-release 81 mg tablet Take 81 mg by mouth nightly.  cholecalciferol (VITAMIN D3) 1,000 unit tablet Take 1,000 Units by mouth daily.  timolol (TIMOPTIC) 0.5 % ophthalmic solution Administer 1 Drop to both eyes two (2) times a day.  HYDROcodone-acetaminophen (NORCO) 5-325 mg per tablet Take 1 Tab by mouth every six (6) hours as needed for Pain.  bimatoprost (LUMIGAN) 0.03 % ophthalmic drops Administer 1 Drop to both eyes nightly.  acetaminophen (TYLENOL EXTRA STRENGTH) 500 mg tablet Take  by mouth every six (6) hours as needed for Pain.  brinzolamide-brimonidine (SIMBRINZA) 1-0.2 % drps Apply 1 Drop to eye two (2) times a day.  finasteride (PROSCAR) 5 mg tablet Take 5 mg by mouth daily.  silodosin (RAPAFLO) 8 mg capsule Take 8 mg by mouth nightly.  levothyroxine (SYNTHROID) 25 mcg tablet Take 25 mcg by mouth Daily (before breakfast).  bacitracin (BACITRACIN) ointment Apply  to affected area two (2) times a day. No current facility-administered medications for this visit.        Vitals:    05/11/18 1059 05/11/18 1112   BP: 122/62 110/56   Pulse: (!) 54    Resp: 16    SpO2: 96%    Weight: 171 lb 9.6 oz (77.8 kg)    Height: 5' 2\" (1.575 m)      Social History     Social History    Marital status:      Spouse name: N/A    Number of children: N/A    Years of education: N/A Occupational History    Not on file. Social History Main Topics    Smoking status: Former Smoker     Packs/day: 0.50     Years: 15.00     Quit date: 12/31/1974    Smokeless tobacco: Never Used    Alcohol use Yes      Comment: OCC. WINE    Drug use: No    Sexual activity: Not on file     Other Topics Concern    Not on file     Social History Narrative       I have reviewed the nurses notes, vitals, problem list, allergy list, medical history, family, social history and medications. Review of Symptoms:    General: Pt denies excessive weight gain or loss. Pt is able to conduct ADL's  HEENT: Denies blurred vision, headaches, epistaxis and difficulty swallowing. Respiratory: Denies shortness of breath, THOMSON, wheezing or stridor. Cardiovascular: + Chest discomfort, palpitations, edema or PND  Gastrointestinal: Denies poor appetite, indigestion, abdominal pain or blood in stool  Musculoskeletal: Denies pain or swelling from muscles or joints  Neurologic: Denies tremor, paresthesias, or sensory motor disturbance  Skin: Having recurrent surgical intervention of scalp lesions. Presents with dressing. Physical Exam:      General: Well developed, in no acute distress, cooperative and alert  HEENT: No carotid bruits, no JVD, trach is midline. Neck Supple, PEERL, EOM intact. Heart:  Normal S1/S2 negative S3 or S4. Regular, no murmur, gallop or rub.   Respiratory: Clear bilaterally x 4, no wheezing or rales  Abdomen:   Soft, non-tender, no masses, bowel sounds are active.   Extremities:  No edema, normal cap refill, no cyanosis, atraumatic. Neuro: A&Ox3, speech clear, gait stable. Skin: Skin color is normal. No rashes or lesions.  Non diaphoretic  Vascular: 2+ pulses symmetric in all extremities    Cardiographics    ECG: Sinus bradycardia  Results for orders placed or performed during the hospital encounter of 08/21/15   EKG, 12 LEAD, INITIAL   Result Value Ref Range    Ventricular Rate 64 BPM Atrial Rate 64 BPM    P-R Interval 182 ms    QRS Duration 86 ms    Q-T Interval 406 ms    QTC Calculation (Bezet) 418 ms    Calculated P Axis 69 degrees    Calculated R Axis 20 degrees    Calculated T Axis 47 degrees    Diagnosis       Sinus rhythm with occasional ventricular-paced complexes  Poor Anterior Forces    When compared with ECG of 27-MAR-2013 13:09,    Vent. rate has decreased BY  53 BPM  Confirmed by Gerry Wilkins ([id number]) on 8/22/2015 3:43:32 PM           Cardiology Labs:  Lab Results   Component Value Date/Time    Cholesterol, total 150 08/23/2017 04:24 PM    HDL Cholesterol 39 (L) 08/23/2017 04:24 PM    LDL, calculated 87 08/23/2017 04:24 PM    Triglyceride 119 08/23/2017 04:24 PM       Lab Results   Component Value Date/Time    Sodium 141 08/23/2017 04:24 PM    Potassium 4.5 08/23/2017 04:24 PM    Chloride 105 08/23/2017 04:24 PM    CO2 21 08/23/2017 04:24 PM    Anion gap 5 11/02/2016 02:58 PM    Glucose 91 08/23/2017 04:24 PM    BUN 19 08/23/2017 04:24 PM    Creatinine 0.92 08/23/2017 04:24 PM    BUN/Creatinine ratio 21 08/23/2017 04:24 PM    GFR est AA 86 08/23/2017 04:24 PM    GFR est non-AA 74 08/23/2017 04:24 PM    Calcium 9.0 08/23/2017 04:24 PM    Bilirubin, total 0.5 08/23/2017 04:24 PM    AST (SGOT) 22 08/23/2017 04:24 PM    Alk. phosphatase 80 08/23/2017 04:24 PM    Protein, total 5.7 (L) 08/23/2017 04:24 PM    Albumin 3.9 08/23/2017 04:24 PM    Globulin 2.9 08/21/2015 05:38 PM    A-G Ratio 2.2 08/23/2017 04:24 PM    ALT (SGPT) 10 08/23/2017 04:24 PM           Assessment:     Assessment:     Diagnoses and all orders for this visit:    1. Chest pain with moderate risk for cardiac etiology  -     LEXISCAN/CARDIOLITE, Clinic Performed; Future    2. Mixed hyperlipidemia  -     AMB POC EKG ROUTINE W/ 12 LEADS, INTER & REP  -     LEXISCAN/CARDIOLITE, Clinic Performed; Future    3. Essential hypertension, benign  -     LEXISCAN/CARDIOLITE, Clinic Performed; Future    4.  Atherosclerosis of native coronary artery of native heart with angina pectoris ([facility]  -     LEXISCAN/CARDIOLITE, Clinic Performed; Future    Other orders  -     nitroglycerin (NITROSTAT) 0.4 mg SL tablet; 1 Tab by SubLINGual route every five (5) minutes as needed for Chest Pain (max 3 tabs daily for chest pain). Up to 3 doses. ICD-10-CM ICD-9-CM    1. Chest pain with moderate risk for cardiac etiology R07.9 786.50 STRESS TEST LEXISCAN/CARDIOLITE   2. Mixed hyperlipidemia E78.2 272.2 AMB POC EKG ROUTINE W/ 12 LEADS, INTER & REP      STRESS TEST LEXISCAN/CARDIOLITE   3. Essential hypertension, benign I10 401.1 STRESS TEST LEXISCAN/CARDIOLITE   4. Atherosclerosis of native coronary artery of native heart with angina pectoris ([facility] I25.119 414.01 STRESS TEST LEXISCAN/CARDIOLITE     413.9      Orders Placed This Encounter    AMB POC EKG ROUTINE W/ 12 LEADS, INTER & REP     Order Specific Question:   Reason for Exam:     Answer:   Routine    LEXISCAN/CARDIOLITE, Clinic Performed     Standing Status:   Future     Standing Expiration Date:   2018     Order Specific Question:   Reason for Exam:     Answer:   angina    isosorbide mononitrate (ISMO, MONOKET) 20 mg tablet     Sig: Take 20 mg by mouth two (2) times a day.  DISCONTD: nitroglycerin (NITRODUR) 0.2 mg/hr     Sig: APPLY ONE PATCH TOPICALLY ONCE DAILY     Refill:  6    DISCONTD: cephALEXin (KEFLEX) 500 mg capsule     Sig: TAKE 1 CAPSULE BY MOUTH FOUR TIMES A DAY     Refill:  0    nitroglycerin (NITROSTAT) 0.4 mg SL tablet     Si Tab by SubLINGual route every five (5) minutes as needed for Chest Pain (max 3 tabs daily for chest pain). Up to 3 doses. Dispense:  1 Bottle     Refill:  1        Plan:     Patient is a 31-year-old male with a history of atherosclerotic heart disease who presented to the emergency room with hypertensive crisis and anterior chest pain. Symptoms have improved with using long-acting nitrates.   He is currently on dual antianginal therapy, will evaluate for ischemia with Lexiscan nuclear stress test.  Follow-up when testing complete. Machelle Hinson MD    This note was created using voice recognition software. Despite editing, there may be syntax errors.

## 2018-05-17 ENCOUNTER — TELEPHONE (OUTPATIENT)
Dept: CARDIOLOGY CLINIC | Age: 83
End: 2018-05-17

## 2018-05-17 NOTE — TELEPHONE ENCOUNTER
Received voicemail message informing patient need surgical clearance for nose reconstruction under mac sedation for Monday May 21,2018.     Returned call to nurse Gemma Harley at Dr Maral Wilkerson, left message on voicemail informing patient not cleared pending stress test scheduled Monday June 4, 2018 at 0900am .

## 2018-06-04 ENCOUNTER — CLINICAL SUPPORT (OUTPATIENT)
Dept: CARDIOLOGY CLINIC | Age: 83
End: 2018-06-04

## 2018-06-04 DIAGNOSIS — R07.9 CHEST PAIN WITH MODERATE RISK FOR CARDIAC ETIOLOGY: Primary | ICD-10-CM

## 2018-06-04 RX ORDER — EZETIMIBE 10 MG/1
TABLET ORAL
Qty: 90 TAB | Refills: 1 | Status: SHIPPED | OUTPATIENT
Start: 2018-06-04 | End: 2018-11-26 | Stop reason: SDUPTHER

## 2018-06-05 ENCOUNTER — TELEPHONE (OUTPATIENT)
Dept: CARDIOLOGY CLINIC | Age: 83
End: 2018-06-05

## 2018-06-05 NOTE — TELEPHONE ENCOUNTER
Pt needs refill on isosorbide mononitrate called into [facility] @ [address] [facility]    This was originally prescribed by ED    Pt taking last pill today    Advised of 24 hour return call policy.

## 2018-06-06 RX ORDER — ISOSORBIDE MONONITRATE 20 MG/1
20 TABLET ORAL 2 TIMES DAILY
Qty: 60 TAB | Refills: 11 | Status: SHIPPED | OUTPATIENT
Start: 2018-06-06 | End: 2018-06-07 | Stop reason: SDUPTHER

## 2018-06-07 ENCOUNTER — CLINICAL SUPPORT (OUTPATIENT)
Dept: CARDIOLOGY CLINIC | Age: 83
End: 2018-06-07

## 2018-06-07 DIAGNOSIS — R07.9 CHEST PAIN WITH MODERATE RISK FOR CARDIAC ETIOLOGY: ICD-10-CM

## 2018-06-07 DIAGNOSIS — I25.119 ATHEROSCLEROSIS OF NATIVE CORONARY ARTERY OF NATIVE HEART WITH ANGINA PECTORIS (HCC): ICD-10-CM

## 2018-06-07 DIAGNOSIS — E78.2 MIXED HYPERLIPIDEMIA: ICD-10-CM

## 2018-06-07 DIAGNOSIS — I10 ESSENTIAL HYPERTENSION, BENIGN: ICD-10-CM

## 2018-06-07 RX ORDER — ISOSORBIDE MONONITRATE 20 MG/1
20 TABLET ORAL 2 TIMES DAILY
Qty: 60 TAB | Refills: 11 | Status: SHIPPED | OUTPATIENT
Start: 2018-06-07 | End: 2019-08-06 | Stop reason: SDUPTHER

## 2018-06-08 ENCOUNTER — TELEPHONE (OUTPATIENT)
Dept: CARDIOLOGY CLINIC | Age: 83
End: 2018-06-08

## 2018-06-08 NOTE — TELEPHONE ENCOUNTER
Returned call to Dr Prema Grier at [address] [place] [address] informed Dr Urszula Hooker reviewed stress test done 6/7/18 and verbally cleared patient for surgical procedure.

## 2018-06-08 NOTE — TELEPHONE ENCOUNTER
----- Message from  Anai sent at 6/7/2018 10:33 AM EDT -----  Regarding: Dr. Fernando Emmanuel patient   Patient had a nuc stress today 6/7/18. Patient has a surgery on Monday 6/11/18 for cancer removal. Patient needs test results for two doctors for this surgery. Dr. Steve Mccabe [phone] (nurse tigre)    Dr. Prema Grier [phone] (nurse Tamica Perry [phone])     Please advise and let me know if I can do anything to help. Thanks.

## 2018-06-11 ENCOUNTER — HOSPITAL ENCOUNTER (INPATIENT)
Age: 83
LOS: 2 days | Discharge: HOME | DRG: 607 | End: 2018-06-13
Attending: OTOLARYNGOLOGY | Admitting: OTOLARYNGOLOGY
Payer: MEDICARE

## 2018-06-11 ENCOUNTER — APPOINTMENT (OUTPATIENT)
Dept: CT IMAGING | Age: 83
DRG: 607 | End: 2018-06-11
Attending: OTOLARYNGOLOGY
Payer: MEDICARE

## 2018-06-11 PROBLEM — C44.42 SQUAMOUS CELL CANCER OF SCALP AND SKIN OF NECK: Status: ACTIVE | Noted: 2018-06-11

## 2018-06-11 LAB
BUN SERPL-MCNC: 22 MG/DL (ref 6–20)
CREAT SERPL-MCNC: 0.89 MG/DL (ref 0.7–1.3)

## 2018-06-11 PROCEDURE — 74011636320 HC RX REV CODE- 636/320: Performed by: OTOLARYNGOLOGY

## 2018-06-11 PROCEDURE — 65270000032 HC RM SEMIPRIVATE

## 2018-06-11 PROCEDURE — 74011250637 HC RX REV CODE- 250/637: Performed by: OTOLARYNGOLOGY

## 2018-06-11 PROCEDURE — 74011000258 HC RX REV CODE- 258: Performed by: OTOLARYNGOLOGY

## 2018-06-11 PROCEDURE — 84520 ASSAY OF UREA NITROGEN: CPT | Performed by: OTOLARYNGOLOGY

## 2018-06-11 PROCEDURE — 74011000250 HC RX REV CODE- 250: Performed by: OTOLARYNGOLOGY

## 2018-06-11 PROCEDURE — 82565 ASSAY OF CREATININE: CPT | Performed by: OTOLARYNGOLOGY

## 2018-06-11 PROCEDURE — 70470 CT HEAD/BRAIN W/O & W/DYE: CPT

## 2018-06-11 PROCEDURE — 36415 COLL VENOUS BLD VENIPUNCTURE: CPT | Performed by: OTOLARYNGOLOGY

## 2018-06-11 RX ORDER — ACETAMINOPHEN 325 MG/1
650 TABLET ORAL
Status: DISCONTINUED | OUTPATIENT
Start: 2018-06-11 | End: 2018-06-13 | Stop reason: HOSPADM

## 2018-06-11 RX ORDER — SODIUM CHLORIDE, SODIUM LACTATE, POTASSIUM CHLORIDE, CALCIUM CHLORIDE 600; 310; 30; 20 MG/100ML; MG/100ML; MG/100ML; MG/100ML
100 INJECTION, SOLUTION INTRAVENOUS CONTINUOUS
Status: CANCELLED | OUTPATIENT
Start: 2018-06-11

## 2018-06-11 RX ORDER — ISOSORBIDE MONONITRATE 20 MG/1
20 TABLET ORAL 2 TIMES DAILY
Status: DISCONTINUED | OUTPATIENT
Start: 2018-06-11 | End: 2018-06-13 | Stop reason: HOSPADM

## 2018-06-11 RX ORDER — OXYCODONE HYDROCHLORIDE 5 MG/1
5 TABLET ORAL AS NEEDED
Status: CANCELLED | OUTPATIENT
Start: 2018-06-11

## 2018-06-11 RX ORDER — SODIUM CHLORIDE 0.9 % (FLUSH) 0.9 %
5-10 SYRINGE (ML) INJECTION EVERY 8 HOURS
Status: DISCONTINUED | OUTPATIENT
Start: 2018-06-11 | End: 2018-06-13 | Stop reason: HOSPADM

## 2018-06-11 RX ORDER — SODIUM CHLORIDE 0.9 % (FLUSH) 0.9 %
5-10 SYRINGE (ML) INJECTION EVERY 8 HOURS
Status: CANCELLED | OUTPATIENT
Start: 2018-06-11

## 2018-06-11 RX ORDER — MIDAZOLAM HYDROCHLORIDE 1 MG/ML
1 INJECTION, SOLUTION INTRAMUSCULAR; INTRAVENOUS AS NEEDED
Status: CANCELLED | OUTPATIENT
Start: 2018-06-11

## 2018-06-11 RX ORDER — OXYCODONE AND ACETAMINOPHEN 5; 325 MG/1; MG/1
1 TABLET ORAL
Status: DISCONTINUED | OUTPATIENT
Start: 2018-06-11 | End: 2018-06-13 | Stop reason: HOSPADM

## 2018-06-11 RX ORDER — MELATONIN
1000 DAILY
Status: DISCONTINUED | OUTPATIENT
Start: 2018-06-12 | End: 2018-06-13 | Stop reason: HOSPADM

## 2018-06-11 RX ORDER — AMLODIPINE BESYLATE 5 MG/1
2.5 TABLET ORAL DAILY
Status: DISCONTINUED | OUTPATIENT
Start: 2018-06-12 | End: 2018-06-13 | Stop reason: HOSPADM

## 2018-06-11 RX ORDER — LEVOTHYROXINE SODIUM 50 UG/1
25 TABLET ORAL
Status: DISCONTINUED | OUTPATIENT
Start: 2018-06-12 | End: 2018-06-13 | Stop reason: HOSPADM

## 2018-06-11 RX ORDER — SODIUM CHLORIDE 9 MG/ML
25 INJECTION, SOLUTION INTRAVENOUS CONTINUOUS
Status: CANCELLED | OUTPATIENT
Start: 2018-06-11 | End: 2018-06-12

## 2018-06-11 RX ORDER — TAMSULOSIN HYDROCHLORIDE 0.4 MG/1
0.4 CAPSULE ORAL
Status: DISCONTINUED | OUTPATIENT
Start: 2018-06-11 | End: 2018-06-13 | Stop reason: HOSPADM

## 2018-06-11 RX ORDER — FENTANYL CITRATE 50 UG/ML
25 INJECTION, SOLUTION INTRAMUSCULAR; INTRAVENOUS
Status: CANCELLED | OUTPATIENT
Start: 2018-06-11

## 2018-06-11 RX ORDER — DIPHENHYDRAMINE HYDROCHLORIDE 50 MG/ML
12.5 INJECTION, SOLUTION INTRAMUSCULAR; INTRAVENOUS AS NEEDED
Status: CANCELLED | OUTPATIENT
Start: 2018-06-11 | End: 2018-06-11

## 2018-06-11 RX ORDER — ROPIVACAINE HYDROCHLORIDE 5 MG/ML
150 INJECTION, SOLUTION EPIDURAL; INFILTRATION; PERINEURAL AS NEEDED
Status: CANCELLED | OUTPATIENT
Start: 2018-06-11

## 2018-06-11 RX ORDER — LIDOCAINE HYDROCHLORIDE 10 MG/ML
0.1 INJECTION, SOLUTION EPIDURAL; INFILTRATION; INTRACAUDAL; PERINEURAL AS NEEDED
Status: CANCELLED | OUTPATIENT
Start: 2018-06-11

## 2018-06-11 RX ORDER — SODIUM CHLORIDE 0.9 % (FLUSH) 0.9 %
10 SYRINGE (ML) INJECTION
Status: COMPLETED | OUTPATIENT
Start: 2018-06-11 | End: 2018-06-11

## 2018-06-11 RX ORDER — MIDAZOLAM HYDROCHLORIDE 1 MG/ML
0.5 INJECTION, SOLUTION INTRAMUSCULAR; INTRAVENOUS
Status: CANCELLED | OUTPATIENT
Start: 2018-06-11

## 2018-06-11 RX ORDER — SODIUM CHLORIDE, SODIUM LACTATE, POTASSIUM CHLORIDE, CALCIUM CHLORIDE 600; 310; 30; 20 MG/100ML; MG/100ML; MG/100ML; MG/100ML
1000 INJECTION, SOLUTION INTRAVENOUS CONTINUOUS
Status: CANCELLED | OUTPATIENT
Start: 2018-06-11 | End: 2018-06-12

## 2018-06-11 RX ORDER — FENTANYL CITRATE 50 UG/ML
50 INJECTION, SOLUTION INTRAMUSCULAR; INTRAVENOUS AS NEEDED
Status: CANCELLED | OUTPATIENT
Start: 2018-06-11

## 2018-06-11 RX ORDER — SODIUM CHLORIDE 0.9 % (FLUSH) 0.9 %
5-10 SYRINGE (ML) INJECTION AS NEEDED
Status: CANCELLED | OUTPATIENT
Start: 2018-06-11

## 2018-06-11 RX ORDER — ONDANSETRON 2 MG/ML
4 INJECTION INTRAMUSCULAR; INTRAVENOUS AS NEEDED
Status: CANCELLED | OUTPATIENT
Start: 2018-06-11

## 2018-06-11 RX ORDER — TIMOLOL MALEATE 5 MG/ML
1 SOLUTION/ DROPS OPHTHALMIC 2 TIMES DAILY
Status: DISCONTINUED | OUTPATIENT
Start: 2018-06-11 | End: 2018-06-13 | Stop reason: HOSPADM

## 2018-06-11 RX ORDER — BACITRACIN 500 UNIT/G
PACKET (EA) TOPICAL 2 TIMES DAILY
Status: DISCONTINUED | OUTPATIENT
Start: 2018-06-11 | End: 2018-06-13 | Stop reason: HOSPADM

## 2018-06-11 RX ORDER — SODIUM CHLORIDE 9 MG/ML
25 INJECTION, SOLUTION INTRAVENOUS CONTINUOUS
Status: CANCELLED | OUTPATIENT
Start: 2018-06-11

## 2018-06-11 RX ORDER — MORPHINE SULFATE 10 MG/ML
2 INJECTION, SOLUTION INTRAMUSCULAR; INTRAVENOUS
Status: CANCELLED | OUTPATIENT
Start: 2018-06-11

## 2018-06-11 RX ORDER — EZETIMIBE 10 MG/1
10 TABLET ORAL
Status: DISCONTINUED | OUTPATIENT
Start: 2018-06-11 | End: 2018-06-13 | Stop reason: HOSPADM

## 2018-06-11 RX ORDER — NITROGLYCERIN 0.4 MG/1
0.4 TABLET SUBLINGUAL
Status: DISCONTINUED | OUTPATIENT
Start: 2018-06-11 | End: 2018-06-13 | Stop reason: HOSPADM

## 2018-06-11 RX ORDER — MORPHINE SULFATE 1 MG/ML
2 INJECTION, SOLUTION EPIDURAL; INTRATHECAL; INTRAVENOUS
Status: DISCONTINUED | OUTPATIENT
Start: 2018-06-11 | End: 2018-06-13 | Stop reason: HOSPADM

## 2018-06-11 RX ORDER — SODIUM CHLORIDE 0.9 % (FLUSH) 0.9 %
5-10 SYRINGE (ML) INJECTION AS NEEDED
Status: DISCONTINUED | OUTPATIENT
Start: 2018-06-11 | End: 2018-06-13 | Stop reason: HOSPADM

## 2018-06-11 RX ORDER — LISINOPRIL 10 MG/1
10 TABLET ORAL DAILY
Status: DISCONTINUED | OUTPATIENT
Start: 2018-06-12 | End: 2018-06-13 | Stop reason: HOSPADM

## 2018-06-11 RX ADMIN — TAMSULOSIN HYDROCHLORIDE 0.4 MG: 0.4 CAPSULE ORAL at 23:54

## 2018-06-11 RX ADMIN — Medication 10 ML: at 19:53

## 2018-06-11 RX ADMIN — IOPAMIDOL 100 ML: 612 INJECTION, SOLUTION INTRAVENOUS at 23:11

## 2018-06-11 RX ADMIN — TIMOLOL MALEATE 1 DROP: 5 SOLUTION OPHTHALMIC at 22:25

## 2018-06-11 RX ADMIN — EZETIMIBE 10 MG: 10 TABLET ORAL at 23:54

## 2018-06-11 RX ADMIN — SODIUM CHLORIDE 100 ML: 900 INJECTION, SOLUTION INTRAVENOUS at 23:11

## 2018-06-11 RX ADMIN — Medication 10 ML: at 23:11

## 2018-06-11 NOTE — H&P
History and Physical    Subjective:     Jeancarlos Bingham is a 80 y.o.  male who presents with SCC scalp s/p Moh's surgery today, removing soft tissue of frontal vertex scalp to calvarium, with soft tissue replacement of bony calvarium in central wound. Planned closure of wound aborted, planning w/u . Past Medical History:   Diagnosis Date    Arthritis     SHOULDERS AND SPINE    CAD (coronary artery disease) 7/1998    MI    Cancer ([address]     chronic lymphocytic leukemia    Cancer ([address]     multiple skin cancers     Chest pain, unspecified     Chronic pain     L 2 LYTIC LESION    Edema     Hypertension     MI (myocardial infarction) ([address]     Postsurgical percutaneous transluminal coronary angioplasty status 6/4/2012    Pre-operative cardiovascular examination     Thyroid disease     HYPO      Past Surgical History:   Procedure Laterality Date    CARDIAC SURG PROCEDURE UNLIST  7/1998    2 Cardiac stents    EGD  6/7/2010         HX ADENOIDECTOMY  1936    HX APPENDECTOMY  1954    HX CATARACT REMOVAL      bilateral    HX ORTHOPAEDIC  4/15/2008    Right hip replacement    HX OTHER SURGICAL  5/2011    Moh's procedure,     HX OTHER SURGICAL      MULTIPLES SKIN CANCER REMOVALS, MOSTLY ON HEAD    HX SKIN BIOPSY  04/27/2018    on scalp     HX TONSILLECTOMY  1936    1936     Family History   Problem Relation Age of Onset    Diabetes Mother     Heart Disease Father     Heart Attack Father 80    Lung Disease Sister      PULMONARY FIBROSIS    Heart Attack Brother     No Known Problems Sister     Anesth Problems Neg Hx       Social History   Substance Use Topics    Smoking status: Former Smoker     Packs/day: 0.50     Years: 15.00     Quit date: 12/31/1974    Smokeless tobacco: Never Used    Alcohol use Yes      Comment: OCC. WINE       Prior to Admission medications    Medication Sig Start Date End Date Taking?  Authorizing Provider   Technetium Tc99M-Tetrofosmin 0.23 mg solr 9.9 millicuries by IntraVENous route once for 1 dose. Radiopharmaceutical administered:Tc99m Tc Tetrofosmin (Myoview)    Amount administered rest : 9.9 mCi  Amount administered stress: 30.9 mCi 6/11/18 6/11/18  Yosi MORENO MD   regadenoson (REGADENSON) 0.4 mg/5 mL syrg injection 5 mL by IntraVENous route once for 1 dose. 6/11/18 6/11/18  Yosi MORENO MD   isosorbide mononitrate (ISMO, MONOKET) 20 mg tablet Take 1 Tab by mouth two (2) times a day. 6/7/18   Kiara Buchanan NP   ezetimibe (ZETIA) 10 mg tablet TAKE ONE TABLET BY MOUTH AT NIGHT 6/4/18   Ethyl Search, NP   nitroglycerin (NITROSTAT) 0.4 mg SL tablet 1 Tab by SubLINGual route every five (5) minutes as needed for Chest Pain (max 3 tabs daily for chest pain). Up to 3 doses. 5/11/18   Prosper Valle NP   bacitracin (BACITRACIN) ointment Apply  to affected area two (2) times a day. 11/3/16   Salma Roy MD   amLODIPine (NORVASC) 2.5 mg tablet Take 2.5 mg by mouth daily. Historical Provider   lisinopril (PRINIVIL, ZESTRIL) 10 mg tablet Take 10 mg by mouth daily. Historical Provider   aspirin delayed-release 81 mg tablet Take 81 mg by mouth nightly. Historical Provider   cholecalciferol (VITAMIN D3) 1,000 unit tablet Take 1,000 Units by mouth daily. Historical Provider   timolol (TIMOPTIC) 0.5 % ophthalmic solution Administer 1 Drop to both eyes two (2) times a day. Historical Provider   HYDROcodone-acetaminophen (NORCO) 5-325 mg per tablet Take 1 Tab by mouth every six (6) hours as needed for Pain. Fidelina Carmona MD   bimatoprost (LUMIGAN) 0.03 % ophthalmic drops Administer 1 Drop to both eyes nightly. Historical Provider   acetaminophen (TYLENOL EXTRA STRENGTH) 500 mg tablet Take  by mouth every six (6) hours as needed for Pain. Historical Provider   brinzolamide-brimonidine ACUITY [facility]) 1-0.2 % drps Apply 1 Drop to eye two (2) times a day.     Historical Provider   finasteride (PROSCAR) 5 mg tablet Take 5 mg by mouth daily. Historical Provider   silodosin (RAPAFLO) 8 mg capsule Take 8 mg by mouth nightly. Historical Provider   levothyroxine (SYNTHROID) 25 mcg tablet Take 25 mcg by mouth Daily (before breakfast). Historical Provider     Allergies   Allergen Reactions    Statins-Hmg-Coa Reductase Inhibitors Myalgia and Other (comments)     Causes aching, generalized        Review of Systems:  A comprehensive review of systems was negative except for that written in the History of Present Illness. Objective: Intake and Output:            Physical Exam:   There were no vitals taken for this visit. General:  Alert, cooperative, no distress, appears stated age. Head:      Approx 10 cm frontal vertex full thickness soft tissue scalp wound defect to calvarium with central 3cm plus of outer table bone replaced with soft tissue. Eyes:  Conjunctivae/corneas clear. PERRL, EOMs intact. Fundi benign   Ears:  Normal TMs and external ear canals both ears. Nose: Nares normal. Septum midline. Mucosa normal. No drainage or sinus tenderness. Throat: Lips, mucosa, and tongue normal. Teeth and gums normal.   Neck: Supple, symmetrical, trachea midline, no adenopathy, thyroid: no enlargement/tenderness/nodules, no carotid bruit and no JVD. Back:   Symmetric, no curvature. ROM normal. No CVA tenderness. Lungs:   Clear to auscultation bilaterally. Chest wall:  No tenderness or deformity. Heart:  Regular rate and rhythm, S1, S2 normal, no murmur, click, rub or gallop. Abdomen:   Soft, non-tender. Bowel sounds normal. No masses,  No organomegaly. Extremities: Extremities normal, atraumatic, no cyanosis or edema. Pulses: 2+ and symmetric all extremities. Skin: Skin color, texture, turgor normal. No rashes or lesions   Lymph nodes: Cervical, supraclavicular, and axillary nodes normal.   Neurologic: CNII-XII intact. Normal strength, sensation and reflexes throughout.          Data Review:   No results found for this or any previous visit (from the past 24 hour(s)). 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 Plan:     1. SCC Scalp and calvarium   Plan CT imaging of head to assess calvarium and extent of tumor spread to latter. Pending findings, may be appropriate to further assess with MRI to examine underlying dura, and possible involvement therein. Will request neurosurgery consult to help guide with current w/u and with plans for wound care and closure of this subtotal scalp excision. If inner table calvarium intact, will plan Integra-type wound closure and delayed STSG to area.      Signed By: Tasha Christensen MD     June 11, 2018

## 2018-06-11 NOTE — PROGRESS NOTES
.. TRANSFER - IN REPORT:    Verbal report received from Trudi(name) on Allie B Zeb Carrel  being received from Ambulatory Care(unit) for routine progression of care      Report consisted of patients Situation, Background, Assessment and   Recommendations(SBAR). Information from the following report(s) Kardex was reviewed with the receiving nurse. Opportunity for questions and clarification was provided. Assessment completed upon patients arrival to unit and care assumed. Per Trudi, patient had NO vitals,  NOIV started nor admission assessment done. She states, Dr. Shavonne Jules ordered to admit patient, surgery is on hold at this time and per MD reinforce dressing to head only.  It was changed today by MD.

## 2018-06-11 NOTE — PROGRESS NOTES
Paged Dr. Rita Lopez whom is on call for Dr. Tan Felder at this time. Patient is requesting to take his own medications. Need order. Received a return call from Dr. Rita Lopez. He states, it ok for patient to take own home meds.

## 2018-06-11 NOTE — PROGRESS NOTES
Received patient from admitting to holding to talk to Dr Tan Felder. Dr Tan Felder had discussion with patient and family about the upcoming surgery. Decision made by all it to admit for testing before the surgery to define exactly what needs to be done. At present Chauncey Iglesias is up here with family waiting for a room assignment and to be admitted. Eating lunch in the ped holding area. TRANSFER - OUT REPORT:    Verbal report given to [address] [place] [address] RN(name) on Jackie Jung  being transferred to (unit) for admission       Report consisted of patients Situation, Background, Assessment and   Recommendations(SBAR). Information from the following report(s) SBAR and MAR was reviewed with the receiving nurse. Lines:       Opportunity for questions and clarification was provided.       Patient transported with:  Registered Nurse and family

## 2018-06-11 NOTE — IP AVS SNAPSHOT
[address] [place] [address] 
[phone] Patient: Theron Grant MRN: [id number] ALU:[id number] You are allergic to the following Allergen Reactions Statins-Hmg-Coa Reductase Inhibitors Myalgia Other (comments) Causes aching, generalized Recent Documentation Height Weight BMI Smoking Status 1.626 m 76.1 kg 28.8 kg/m2 Former Smoker Emergency Contacts  (Rel.) Home Phone Work Phone Mobile Phone Go Gray (Child) -- -- [phone] Whitney Perez (Spouse) [phone] -- --  
 David Ling (Son) -- -- [phone] About your hospitalization You were admitted on:  June 11, 2018 You last received care in the:  [facility] You were discharged on:  June 13, 2018 Why you were hospitalized Your primary diagnosis was:  Not on File Your diagnoses also included:  Squamous Cell Cancer Of Scalp And Skin Of Neck Providers Seen During Your Hospitalization Provider Specialty Primary office phone Kiara Jimenez MD Otolaryngology [phone] Your Primary Care Physician (PCP) Primary Care Physician Office Phone Office Fax [address] [phone] Follow-up Information Follow up With Details Comments Contact Info Jermaine Roland MD   [address] [place] [address] 
[phone] My Medications ASK your physician about these medications Instructions Each Dose to Equal  
 Morning Noon Evening Bedtime  
 amLODIPine 2.5 mg tablet Commonly known as:  Lucian Jain Your last dose was: Your next dose is: Take 2.5 mg by mouth daily. 2.5 mg  
    
   
   
   
  
 aspirin delayed-release 81 mg tablet Your last dose was: Your next dose is: Take 81 mg by mouth nightly.   
 81 mg  
    
   
   
   
  
 bacitracin zinc ointment Commonly known as:  BACITRACIN Your last dose was: Your next dose is:    
   
   
 Apply  to affected area two (2) times a day.  
     
   
   
   
  
 ezetimibe 10 mg tablet Commonly known as:  Lucie Jade Your last dose was: Your next dose is: TAKE ONE TABLET BY MOUTH AT NIGHT  
     
   
   
   
  
 finasteride 5 mg tablet Commonly known as:  PROSCAR Your last dose was: Your next dose is: Take 5 mg by mouth daily. 5 mg  
    
   
   
   
  
 isosorbide mononitrate 20 mg tablet Commonly known as:  ISMO, MONOKET Your last dose was: Your next dose is: Take 1 Tab by mouth two (2) times a day. 20 mg  
    
   
   
   
  
 levothyroxine 25 mcg tablet Commonly known as:  SYNTHROID Your last dose was: Your next dose is: Take 25 mcg by mouth Daily (before breakfast). 25 mcg  
    
   
   
   
  
 lisinopril 10 mg tablet Commonly known as:  Lynn Travis Your last dose was: Your next dose is: Take 10 mg by mouth daily. 10 mg  
    
   
   
   
  
 LUMIGAN 0.03 % ophthalmic drops Generic drug:  bimatoprost  
   
Your last dose was: Your next dose is:    
   
   
 Administer 1 Drop to both eyes nightly. 1 Drop  
    
   
   
   
  
 nitroglycerin 0.4 mg SL tablet Commonly known as:  NITROSTAT Your last dose was: Your next dose is:    
   
   
 1 Tab by SubLINGual route every five (5) minutes as needed for Chest Pain (max 3 tabs daily for chest pain). Up to 3 doses. 0.4 mg  
    
   
   
   
  
 NORCO 5-325 mg per tablet Generic drug:  HYDROcodone-acetaminophen Your last dose was: Your next dose is: Take 1 Tab by mouth every six (6) hours as needed for Pain. 1 Tab RAPAFLO 8 mg capsule Generic drug:  silodosin Your last dose was: Your next dose is: Take 8 mg by mouth nightly. 8 mg  
    
   
   
   
  
 regadenoson 0.4 mg/5 mL Syrg injection Commonly known as:  regadenson Ask about: Should I take this medication? Your last dose was: Your next dose is:    
   
   
 5 mL by IntraVENous route once for 1 dose. 0.4 mg  
    
   
   
   
  
 SIMBRINZA 1-0.2 % Drps Generic drug:  brinzolamide-brimonidine Your last dose was: Your next dose is:    
   
   
 Apply 1 Drop to eye two (2) times a day. 1 Drop Technetium Tc99M-Tetrofosmin 0.23 mg Solr Ask about: Should I take this medication? Your last dose was: Your next dose is:    
   
   
 9.9 millicuries by IntraVENous route once for 1 dose. Radiopharmaceutical administered:Tc99m Tc Tetrofosmin (Myoview)  Amount administered rest : 9.9 mCi Amount administered stress: 14.1 mCi  
 9.9 millicurie  
    
   
   
   
  
 timolol 0.5 % ophthalmic solution Commonly known as:  TIMOPTIC Your last dose was: Your next dose is:    
   
   
 Administer 1 Drop to both eyes two (2) times a day. 1 Drop TYLENOL EXTRA STRENGTH 500 mg tablet Generic drug:  acetaminophen Your last dose was: Your next dose is: Take  by mouth every six (6) hours as needed for Pain. VITAMIN D3 1,000 unit tablet Generic drug:  cholecalciferol Your last dose was: Your next dose is: Take 1,000 Units by mouth daily. 1000 Units Discharge Instructions Wound Care: After Your Visit Your Care Instructions Taking good care of your wound at home will help it heal quickly and reduce your chance of infection. The doctor has checked you carefully, but problems can develop later. If you notice any problems or new symptoms, get medical treatment right away. Follow-up care is a key part of your treatment and safety. Be sure to make and go to all appointments, and call your doctor if you are having problems. It's also a good idea to know your test results and keep a list of the medicines you take. How can you care for yourself at home? · Leave Xeroform dressing in place ·  
¨ You may cover the wound with a thin layer of antibiotic ointment, such as bacitracin, and a nonstick bandage. ¨ Apply more ointment and replace the bandage as needed. · Take pain medicines exactly as directed. Some pain is normal with a wound, but do not ignore pain that is getting worse instead of better. You could have an infection. ¨ If the doctor gave you a prescription medicine for pain, take it as prescribed. ¨ If you are not taking a prescription pain medicine, ask your doctor if you can take an over-the-counter medicine. · Your doctor may have closed your wound with stitches (sutures), staples, or skin glue. ¨ If you have stitches, your doctor may remove them after several days to 2 weeks. Or you may have stitches that dissolve on their own. ¨ If you have staples, your doctor may remove them after 7 to 10 days. ¨ If your wound was closed with skin glue, the glue will wear off in a few days to 2 weeks. When should you call for help? Call your doctor now or seek immediate medical care if: 
· You have signs of infection, such as: 
¨ Increased pain, swelling, warmth, or redness near the wound. ¨ Red streaks leading from the wound. ¨ Pus draining from the wound. ¨ A fever. · You bleed so much from your incision that you soak one or more bandages over 2 to 4 hours. Watch closely for changes in your health, and be sure to contact your doctor if: · The wound is not getting better each day. Where can you learn more? Go to Planandoo.be Enter Y988 in the search box to learn more about \"Wound Care: After Your Visit. \"  
 © 5128-9420 Healthwise, Incorporated. Care instructions adapted under license by [facility] (which disclaims liability or warranty for this information). This care instruction is for use with your licensed healthcare professional. If you have questions about a medical condition or this instruction, always ask your healthcare professional. [address] any warranty or liability for your use of this information. Content Version: 76.9.342709; Last Revised: 2012 I have reviewed discharge instructions with the patient. The patient verbalized understanding. MyChart Activation Thank you for requesting access to [email or web address]. Please follow the instructions below to securely access and download your online medical record. [email or web address] allows you to send messages to your doctor, view your test results, renew your prescriptions, schedule appointments, and more. How Do I Sign Up? 1. In your internet browser, go to [email or web address] 
2. Click on the First Time User? Click Here link in the Sign In box. You will be redirect to the New Member Sign Up page. 3. Enter your [email or web address] Access Code exactly as it appears below. You will not need to use this code after youve completed the sign-up process. If you do not sign up before the expiration date, you must request a new code. [email or web address] Access Code: [email or web address] Expires: 2018  8:45 AM (This is the date your [email or web address] access code will ) 4. Enter the last four digits of your Social Security Number (xxxx) and Date of Birth (mm/dd/yyyy) as indicated and click Submit. You will be taken to the next sign-up page. 5. Create a [email or web address] ID. This will be your [email or web address]com login ID and cannot be changed, so think of one that is secure and easy to remember. 6. Create a [employer] password. You can change your password at any time. 7. Enter your Password Reset Question and Answer.  This can be used at a later time if you forget your password. 8. Enter your e-mail address. You will receive e-mail notification when new information is available in [address] 9. Click Sign Up. You can now view and download portions of your medical record. 10. Click the Download Summary menu link to download a portable copy of your medical information. Additional Information If you have questions, please visit the Frequently Asked Questions section of the [employer] website at [email or web address] Remember, [employer] is NOT to be used for urgent needs. For medical emergencies, dial 911. Discharge Orders None Introducing [facility]! Britney York introduces [employer] Mail patient portal. Now you can access parts of your medical record, email your doctor's office, and request medication refills online. 1. In your internet browser, go to [email or web address] 2. Click on the First Time User? Click Here link in the Sign In box. You will see the New Member Sign Up page. 3. Enter your [employer] Mail Access Code exactly as it appears below. You will not need to use this code after youve completed the sign-up process. If you do not sign up before the expiration date, you must request a new code. · [employer] Access Code: [email or web address] Expires: 9/2/2018  8:45 AM 
 
4. Enter the last four digits of your Social Security Number (xxxx) and Date of Birth (mm/dd/yyyy) as indicated and click Submit. You will be taken to the next sign-up page. 5. Create a [employer] Mail ID. This will be your [employer] login ID and cannot be changed, so think of one that is secure and easy to remember. 6. Create a [employer] Mail password. You can change your password at any time. 7. Enter your Password Reset Question and Answer. This can be used at a later time if you forget your password. 8. Enter your e-mail address. You will receive e-mail notification when new information is available in [address] 9. Click Sign Up. You can now view and download portions of your medical record. 10. Click the Download Summary menu link to download a portable copy of your medical information. If you have questions, please visit the Frequently Asked Questions section of the HealthDataInsights website. Remember, HealthData[employer]ights is NOT to be used for urgent needs. For medical emergencies, dial 911. Now available from your iPhone and Android! General Information Please provide this summary of care documentation to your next provider. Patient Signature:  ____________________________________________________________ Date:  ____________________________________________________________  
  
[facility] Signature:  ____________________________________________________________ Date:  ____________________________________________________________

## 2018-06-11 NOTE — PROGRESS NOTES
Received a call from Gila Butler in [email or web address]. He states, pt has two order or CT scan and they will not be performing until MD clarify if pt is to have CT with or without contrast. He is informed, I have the on call MD Dov Clark on call already and will inform him/her to clarify order. Received a return call from Dr. Kenn Dominguez.  He states CT needs to be with contrast.

## 2018-06-12 ENCOUNTER — APPOINTMENT (OUTPATIENT)
Dept: MRI IMAGING | Age: 83
DRG: 607 | End: 2018-06-12
Attending: OTOLARYNGOLOGY
Payer: MEDICARE

## 2018-06-12 ENCOUNTER — TELEPHONE (OUTPATIENT)
Dept: CARDIOLOGY CLINIC | Age: 83
End: 2018-06-12

## 2018-06-12 PROCEDURE — 70553 MRI BRAIN STEM W/O & W/DYE: CPT

## 2018-06-12 PROCEDURE — 74011250636 HC RX REV CODE- 250/636: Performed by: OTOLARYNGOLOGY

## 2018-06-12 PROCEDURE — A9575 INJ GADOTERATE MEGLUMI 0.1ML: HCPCS | Performed by: OTOLARYNGOLOGY

## 2018-06-12 PROCEDURE — 65270000032 HC RM SEMIPRIVATE

## 2018-06-12 PROCEDURE — 74011250637 HC RX REV CODE- 250/637: Performed by: OTOLARYNGOLOGY

## 2018-06-12 RX ORDER — GADOTERATE MEGLUMINE 376.9 MG/ML
15 INJECTION INTRAVENOUS
Status: COMPLETED | OUTPATIENT
Start: 2018-06-12 | End: 2018-06-12

## 2018-06-12 RX ORDER — SODIUM CHLORIDE 0.9 % (FLUSH) 0.9 %
10 SYRINGE (ML) INJECTION
Status: COMPLETED | OUTPATIENT
Start: 2018-06-12 | End: 2018-06-12

## 2018-06-12 RX ADMIN — Medication 10 ML: at 20:25

## 2018-06-12 RX ADMIN — TAMSULOSIN HYDROCHLORIDE 0.4 MG: 0.4 CAPSULE ORAL at 20:25

## 2018-06-12 RX ADMIN — TIMOLOL MALEATE 1 DROP: 5 SOLUTION OPHTHALMIC at 09:28

## 2018-06-12 RX ADMIN — Medication 5 ML: at 14:00

## 2018-06-12 RX ADMIN — EZETIMIBE 10 MG: 10 TABLET ORAL at 20:25

## 2018-06-12 RX ADMIN — VITAMIN D, TAB 1000IU (100/BT) 1000 UNITS: 25 TAB at 09:24

## 2018-06-12 RX ADMIN — Medication 10 ML: at 06:11

## 2018-06-12 RX ADMIN — ISOSORBIDE MONONITRATE 20 MG: 20 TABLET ORAL at 19:10

## 2018-06-12 RX ADMIN — TIMOLOL MALEATE 1 DROP: 5 SOLUTION OPHTHALMIC at 19:12

## 2018-06-12 RX ADMIN — LISINOPRIL 10 MG: 10 TABLET ORAL at 09:25

## 2018-06-12 RX ADMIN — Medication 10 ML: at 15:00

## 2018-06-12 RX ADMIN — AMLODIPINE BESYLATE 2.5 MG: 5 TABLET ORAL at 09:25

## 2018-06-12 RX ADMIN — GADOTERATE MEGLUMINE 15 ML: 376.9 INJECTION INTRAVENOUS at 15:00

## 2018-06-12 RX ADMIN — LEVOTHYROXINE SODIUM 25 MCG: 50 TABLET ORAL at 06:11

## 2018-06-12 RX ADMIN — ISOSORBIDE MONONITRATE 20 MG: 20 TABLET ORAL at 11:09

## 2018-06-12 NOTE — PROGRESS NOTES
Reason for Admission:   Admitted for additional W/U and neurosurgery consult for recommendation of wound closer/wound care S/P subtotal scalp excision due to squamous cell CA. RRAT Score:     15             Do you (patient/family) have any concerns for transition/discharge? Patient voiced no concerns upon interview              Plan for utilizing home health:     TBD    Likelihood of readmission? Mod            Transition of Care Plan:      Upon interview patient verified demographics, PCP and NOK. Patient is independent and lives with his spouse. Patient has not been receiving home health nursing care but anticipates he may need that at discharge. CM to follow and assess for discharge needs. Care Management Interventions  PCP Verified by CM:  Yes  MyChart Signup: No  Discharge Durable Medical Equipment: No  Physical Therapy Consult: No  Occupational Therapy Consult: No  Speech Therapy Consult: No  Current Support Network: Lives with Spouse  Confirm Follow Up Transport: Family  Plan discussed with Pt/Family/Caregiver: Yes  Freedom of Choice Offered: Yes     [phone]

## 2018-06-12 NOTE — PROGRESS NOTES
Bedside shift change report given to Malini Rubio RN (oncoming nurse) by Marsha Quesada RN (offgoing nurse). Report included the following information SBAR. Patient for MRI; form filled out on computer and signed by this nurse and patient; MRI phoned to say only half of answers were visible; form printed out & signed by this nurse @ patient and faxed to MRI @ [phone].

## 2018-06-12 NOTE — PROGRESS NOTES
Primary Nurse Wendy Hughes RN and Reji Guerrero RN performed a dual skin assessment on this patient Impairment noted- see wound doc flow sheet  Amilcar score is 21. Patient has a wound on his head from removal of cancerous tumor and covered with kelix,guaze and 4x4's. Patient has scattered bruises on the upper extremities and dry skin.

## 2018-06-12 NOTE — PROGRESS NOTES
Bedside shift change report given to anitha (oncoming nurse) by Tee Cohen (offgoing nurse). Report included the following information SBAR, Kardex, MAR and Recent Results was informed by ashley she tried to call dr Phyllis Lindsay at [phone]. Filipe Ferro Spoke with dr abdalla on call for reji and informed no one has seen the patient today and family was concerned about dsg change. Order to only change by surgeon. Ok to leave dressing until dr Phyllis Lindsay sees patient.

## 2018-06-12 NOTE — PROGRESS NOTES
Spiritual Care Partner Volunteer visited patient in room 602 on 6.12.18. Documented by: : Rev. Juanita Cherry; [facility], to contact [phone] James Mera call: [phone]

## 2018-06-12 NOTE — PROGRESS NOTES
Pt seen and counseled. Imaging shows invasion of skull down to inner table. I do not see obvious epidural spread or brain invasion. Craniectomy  is not without risk, but reasonable from my standpoint. Plan per Dr. Stanley Postal.   Consult dictated

## 2018-06-12 NOTE — CONSULTS
[address]  MR#: [id number]  : 1929  ACCOUNT #: [de-identified]   DATE OF SERVICE: 2018    CONSULTING PHYSICIAN:  Dr. David Bateman, ENT Plastics. HISTORY OF PRESENT ILLNESS:  The patient is an 59-year-old gentleman with squamous cell carcinoma of the scalp, status post multiple Mohs surgeries and even at one point I believe a free flap. He has had removal of soft tissue of the frontal vertex to the calvarium with a number of procedures. He was admitted. He has likely bony invasion of the calvarium and was planned to have outpatient surgery by Dr. Candido Chase, and was admitted for further workup and evaluation. He underwent CT and MRI today. PAST MEDICAL HISTORY:  Squamous cell carcinoma, chronic lymphocytic leukemia, multiple skin cancers, coronary artery disease, hypertension, angioplasty, hypothyroidism. PAST SURGICAL HISTORY:  Cardiac stents, EGD, appendectomy, multiple scalp surgeries, hip replacement. SOCIAL HISTORY:  He is , lives at home. REVIEW OF SYSTEMS:  As above. NEUROLOGIC EXAMINATION:  He is an elderly gentleman in no apparent distress. He has a bandaged scalp with a large circular defect on the top of his skull. He is awake, alert, and oriented x3. GCS 15. Normal strength, normal sensation. Reflexes benign. IMAGING:  The CT and MRI shows squamous cell carcinoma invasion of the calvarium all the way down to the inner table. It does not appear to have spread into the epidural space or the dura. ASSESSMENT AND PLAN:  The patient has a difficult problem, with multiple recurrent squamous cell carcinoma of the scalp, multiple Mohs procedure, I believe a previous skin graft. He has an exposed skull with now a tumor in the skull. At this point surgery is not without risk, and it is an aggressive approach; however, it is reasonable to perform a craniectomy as long as we understand the risk.   Primary risk would be injury to the underlying sagittal sinus. Having said that, we could probably leave a small rim of bone in most places and be safe. The issue is going to have be coordinated with Plastic Surgery as to whether or not there is are possibilities for coverage. I am happy to continue to help surgically as needed. I will communicate with Dr. Ellis Padilla, ENT Plastics. MD Steph Christopher / Jose Rosas  D: 06/12/2018 16:05     T: 06/12/2018 16:47  JOB #: [id number]

## 2018-06-12 NOTE — TELEPHONE ENCOUNTER
Spoke to patient's daughter, Roque Santoro on HIPAA using 2 identifiers. Placed a courtesy call to make sure there was nothing she needed from our office regarding clearance for procedure. She stated everything is good.

## 2018-06-12 NOTE — PROGRESS NOTES
[id number] called lab to check results on BUN and CR labs that were sent stat and not showing in connect care, per  [facility], labs had resulted just not showing in connect care. Results faxed to unit,  BUN 22, Cr 0.19, results called to CT so patient can proceed with scheduled head CT.

## 2018-06-13 VITALS
SYSTOLIC BLOOD PRESSURE: 156 MMHG | HEART RATE: 73 BPM | WEIGHT: 167.8 LBS | BODY MASS INDEX: 28.65 KG/M2 | OXYGEN SATURATION: 96 % | DIASTOLIC BLOOD PRESSURE: 90 MMHG | HEIGHT: 64 IN | RESPIRATION RATE: 18 BRPM | TEMPERATURE: 97.7 F

## 2018-06-13 PROCEDURE — 74011250637 HC RX REV CODE- 250/637: Performed by: OTOLARYNGOLOGY

## 2018-06-13 RX ADMIN — LISINOPRIL 10 MG: 10 TABLET ORAL at 08:54

## 2018-06-13 RX ADMIN — AMLODIPINE BESYLATE 2.5 MG: 5 TABLET ORAL at 08:53

## 2018-06-13 RX ADMIN — LEVOTHYROXINE SODIUM 25 MCG: 50 TABLET ORAL at 06:48

## 2018-06-13 RX ADMIN — ISOSORBIDE MONONITRATE 20 MG: 20 TABLET ORAL at 11:44

## 2018-06-13 RX ADMIN — VITAMIN D, TAB 1000IU (100/BT) 1000 UNITS: 25 TAB at 08:54

## 2018-06-13 RX ADMIN — Medication 10 ML: at 06:00

## 2018-06-13 RX ADMIN — TIMOLOL MALEATE 1 DROP: 5 SOLUTION OPHTHALMIC at 08:55

## 2018-06-13 NOTE — PROGRESS NOTES
Problem: Falls - Risk of  Goal: *Absence of Falls  Document Katherin Fall Risk and appropriate interventions in the flowsheet.    Outcome: Progressing Towards Goal  Fall Risk Interventions:            Medication Interventions: Evaluate medications/consider consulting pharmacy

## 2018-06-13 NOTE — DISCHARGE INSTRUCTIONS
Wound Care: After Your Visit  Your Care Instructions  Taking good care of your wound at home will help it heal quickly and reduce your chance of infection. The doctor has checked you carefully, but problems can develop later. If you notice any problems or new symptoms, get medical treatment right away. Follow-up care is a key part of your treatment and safety. Be sure to make and go to all appointments, and call your doctor if you are having problems. It's also a good idea to know your test results and keep a list of the medicines you take. How can you care for yourself at home? · Leave Xeroform dressing in place  ·   ¨ You may cover the wound with a thin layer of antibiotic ointment, such as bacitracin, and a nonstick bandage. ¨ Apply more ointment and replace the bandage as needed. · Take pain medicines exactly as directed. Some pain is normal with a wound, but do not ignore pain that is getting worse instead of better. You could have an infection. ¨ If the doctor gave you a prescription medicine for pain, take it as prescribed. ¨ If you are not taking a prescription pain medicine, ask your doctor if you can take an over-the-counter medicine. · Your doctor may have closed your wound with stitches (sutures), staples, or skin glue. ¨ If you have stitches, your doctor may remove them after several days to 2 weeks. Or you may have stitches that dissolve on their own. ¨ If you have staples, your doctor may remove them after 7 to 10 days. ¨ If your wound was closed with skin glue, the glue will wear off in a few days to 2 weeks. When should you call for help? Call your doctor now or seek immediate medical care if:  · You have signs of infection, such as:  ¨ Increased pain, swelling, warmth, or redness near the wound. ¨ Red streaks leading from the wound. ¨ Pus draining from the wound. ¨ A fever. · You bleed so much from your incision that you soak one or more bandages over 2 to 4 hours.   Watch closely for changes in your health, and be sure to contact your doctor if:  · The wound is not getting better each day. Where can you learn more? Go to CARD.com.be  Enter M973 in the search box to learn more about \"Wound Care: After Your Visit. \"   © 5007-0528 Healthwise, Incorporated. Care instructions adapted under license by [employer] (which disclaims liability or warranty for this information). This care instruction is for use with your licensed healthcare professional. If you have questions about a medical condition or this instruction, always ask your healthcare professional. [address] any warranty or liability for your use of this information. Content Version: 08.3.175714; Last Revised: 2012      I have reviewed discharge instructions with the patient. The patient verbalized understanding. [employer] Activation    Thank you for requesting access to [employer]. Please follow the instructions below to securely access and download your online medical record. [employer] allows you to send messages to your doctor, view your test results, renew your prescriptions, schedule appointments, and more. How Do I Sign Up? 1. In your internet browser, go to [email or web address]  2. Click on the First Time User? Click Here link in the Sign In box. You will be redirect to the New Member Sign Up page. 3. Enter your [employer] Access Code exactly as it appears below. You will not need to use this code after youve completed the sign-up process. If you do not sign up before the expiration date, you must request a new code. [employer] Access Code: [email or web address]  Expires: 2018  8:45 AM (This is the date your [employer] access code will )    4. Enter the last four digits of your Social Security Number (xxxx) and Date of Birth (mm/dd/yyyy) as indicated and click Submit. You will be taken to the next sign-up page. 5. Create a [employer] ID.  This will be your [employer] login ID and cannot be changed, so think of one that is secure and easy to remember. 6. Create a [employer] password. You can change your password at any time. 7. Enter your Password Reset Question and Answer. This can be used at a later time if you forget your password. 8. Enter your e-mail address. You will receive e-mail notification when new information is available in [address] 9. Click Sign Up. You can now view and download portions of your medical record. 10. Click the Download Summary menu link to download a portable copy of your medical information. Additional Information    If you have questions, please visit the Frequently Asked Questions section of the [employer] website at [email or web address] Remember, [employer] is NOT to be used for urgent needs. For medical emergencies, dial 911.

## 2018-06-13 NOTE — PROGRESS NOTES
Otolaryngology, Head and Neck Surgery        The patient has been admitted for investigation of full thickness scalp SCC with bony invasion. Awaiting CT and MRI imaging. No symptoms noted. Pain controlled. Hospital Problems  Date Reviewed: 5/31/2018          Codes Class Noted POA    Squamous cell cancer of scalp and skin of neck ICD-10-CM: C44.42  ICD-9-CM: 173.42  6/11/2018 Yes              Current Facility-Administered Medications   Medication Dose Route Frequency    acetaminophen (TYLENOL) tablet 650 mg  650 mg Oral Q6H PRN    amLODIPine (NORVASC) tablet 2.5 mg  2.5 mg Oral DAILY    bacitracin 500 unit/gram packet   Topical BID    brinzolamide-brimonidine 1-0.2 % drps 1 Drop (Patient Supplied)  1 Drop Both Eyes BID    cholecalciferol (VITAMIN D3) tablet 1,000 Units  1,000 Units Oral DAILY    ezetimibe (ZETIA) tablet 10 mg  10 mg Oral QHS    isosorbide mononitrate (ISMO, MONOKET) tablet 20 mg  20 mg Oral BID    levothyroxine (SYNTHROID) tablet 25 mcg  25 mcg Oral ACB    lisinopril (PRINIVIL, ZESTRIL) tablet 10 mg  10 mg Oral DAILY    nitroglycerin (NITROSTAT) tablet 0.4 mg  0.4 mg SubLINGual Q5MIN PRN    tamsulosin (FLOMAX) capsule 0.4 mg  0.4 mg Oral QHS    timolol (TIMOPTIC) 0.5 % ophthalmic solution 1 Drop  1 Drop Both Eyes BID    sodium chloride (NS) flush 5-10 mL  5-10 mL IntraVENous Q8H    sodium chloride (NS) flush 5-10 mL  5-10 mL IntraVENous PRN    oxyCODONE-acetaminophen (PERCOCET) 5-325 mg per tablet 1 Tab  1 Tab Oral Q4H PRN    morphine (pf) (DURAMORPH) 1 mg/mL injection 2 mg  2 mg IntraVENous Q2H PRN       No results found for this or any previous visit (from the past 24 hour(s)).         Visit Vitals    /68 (BP 1 Location: Right arm, BP Patient Position: Sitting)    Pulse (!) 56    Temp 97.5 °F (36.4 °C)  Comment: RN recheck    Resp 18    Ht 5' 4\" (1.626 m)    Wt 76.1 kg (167 lb 12.8 oz)    SpO2 95%    BMI 28.8 kg/m2     No intake or output data in the 24 hours ending 06/12/18 2338    The patient is a well developed, well nourished adult in no distress    Scalp incision dressed, no swelling or increased erythema or induration      Chest: Clear to auscultation bilaterally. No wheezes or crackles  Cardiovascular: Regular rate and rhythm    Lower extremities: no calf tenderness    A:   Hospital Problems  Date Reviewed: 5/31/2018          Codes Class Noted POA    Squamous cell cancer of scalp and skin of neck ICD-10-CM: C44.42  ICD-9-CM: 173.42  6/11/2018 Yes                  Plan:  SCC Scalp    CT suggests intact inner table calvarium. Appreciate neuro input. Will plan surgical option with Neuro regarding tumor extirpation and coverage. Concerned over multiple scalp skin ca in past with broad areas of repair scar and secondary intention and skin graft healing, implying that vascularized flap reconstruction will be very challenging.

## 2018-06-13 NOTE — PROGRESS NOTES
Otolaryngology, Head and Neck Surgery        No problems. Eating Walking   No pain  No bleeding. Hospital Problems  Date Reviewed: 5/31/2018          Codes Class Noted POA    Squamous cell cancer of scalp and skin of neck ICD-10-CM: C44.42  ICD-9-CM: 173.42  6/11/2018 Yes              Current Facility-Administered Medications   Medication Dose Route Frequency    acetaminophen (TYLENOL) tablet 650 mg  650 mg Oral Q6H PRN    amLODIPine (NORVASC) tablet 2.5 mg  2.5 mg Oral DAILY    bacitracin 500 unit/gram packet   Topical BID    brinzolamide-brimonidine 1-0.2 % drps 1 Drop (Patient Supplied)  1 Drop Both Eyes BID    cholecalciferol (VITAMIN D3) tablet 1,000 Units  1,000 Units Oral DAILY    ezetimibe (ZETIA) tablet 10 mg  10 mg Oral QHS    isosorbide mononitrate (ISMO, MONOKET) tablet 20 mg  20 mg Oral BID    levothyroxine (SYNTHROID) tablet 25 mcg  25 mcg Oral ACB    lisinopril (PRINIVIL, ZESTRIL) tablet 10 mg  10 mg Oral DAILY    nitroglycerin (NITROSTAT) tablet 0.4 mg  0.4 mg SubLINGual Q5MIN PRN    tamsulosin (FLOMAX) capsule 0.4 mg  0.4 mg Oral QHS    timolol (TIMOPTIC) 0.5 % ophthalmic solution 1 Drop  1 Drop Both Eyes BID    sodium chloride (NS) flush 5-10 mL  5-10 mL IntraVENous Q8H    sodium chloride (NS) flush 5-10 mL  5-10 mL IntraVENous PRN    oxyCODONE-acetaminophen (PERCOCET) 5-325 mg per tablet 1 Tab  1 Tab Oral Q4H PRN    morphine (pf) (DURAMORPH) 1 mg/mL injection 2 mg  2 mg IntraVENous Q2H PRN       No results found for this or any previous visit (from the past 24 hour(s)).         Visit Vitals    /90 (BP 1 Location: Left arm, BP Patient Position: At rest)    Pulse 73    Temp 97.7 °F (36.5 °C)    Resp 18    Ht 5' 4\" (1.626 m)    Wt 76.1 kg (167 lb 12.8 oz)    SpO2 96%    BMI 28.8 kg/m2       Intake/Output Summary (Last 24 hours) at 06/13/18 1208  Last data filed at 06/13/18 0819   Gross per 24 hour   Intake              360 ml   Output              600 ml   Net -240 ml       The patient is a well developed, well nourished adult in no distress    Changed dressing:   incision intact, no swelling or increased erythema or induration  Debrided clot  Removed granulation tissue  Cleaned with saline  Dressed with Xeroform and compression dressing      Chest: Clear to auscultation bilaterally. No wheezes or crackles  Cardiovascular: Regular rate and rhythm    Lower extremities: no calf tenderness    A:   Hospital Problems  Date Reviewed: 5/31/2018          Codes Class Noted POA    Squamous cell cancer of scalp and skin of neck ICD-10-CM: C44.42  ICD-9-CM: 173.42  6/11/2018 Yes                  Plan:  Plan D/C home  Discussed case further with neurosurgery.   Planning Monday am surgery to removed outer table calvarium and place xenograft  Discussed with pt

## 2018-06-15 ENCOUNTER — HOSPITAL ENCOUNTER (OUTPATIENT)
Dept: PREADMISSION TESTING | Age: 83
Discharge: HOME | DRG: 464 | End: 2018-06-15
Payer: MEDICARE

## 2018-06-15 VITALS
BODY MASS INDEX: 30.12 KG/M2 | HEIGHT: 63 IN | RESPIRATION RATE: 14 BRPM | DIASTOLIC BLOOD PRESSURE: 65 MMHG | OXYGEN SATURATION: 96 % | WEIGHT: 170 LBS | SYSTOLIC BLOOD PRESSURE: 108 MMHG | HEART RATE: 54 BPM | TEMPERATURE: 98.1 F

## 2018-06-15 LAB
ALBUMIN SERPL-MCNC: 3.3 G/DL (ref 3.5–5)
ALBUMIN/GLOB SERPL: 1.3 {RATIO} (ref 1.1–2.2)
ALP SERPL-CCNC: 74 U/L (ref 45–117)
ALT SERPL-CCNC: 17 U/L (ref 12–78)
ANION GAP SERPL CALC-SCNC: 9 MMOL/L (ref 5–15)
AST SERPL-CCNC: 16 U/L (ref 15–37)
BASOPHILS # BLD: 0 K/UL (ref 0–0.1)
BASOPHILS NFR BLD: 0 % (ref 0–1)
BILIRUB SERPL-MCNC: 0.4 MG/DL (ref 0.2–1)
BUN SERPL-MCNC: 25 MG/DL (ref 6–20)
BUN/CREAT SERPL: 26 (ref 12–20)
CALCIUM SERPL-MCNC: 8.7 MG/DL (ref 8.5–10.1)
CHLORIDE SERPL-SCNC: 104 MMOL/L (ref 97–108)
CO2 SERPL-SCNC: 27 MMOL/L (ref 21–32)
CREAT SERPL-MCNC: 0.96 MG/DL (ref 0.7–1.3)
DIFFERENTIAL METHOD BLD: NORMAL
EOSINOPHIL # BLD: 0.1 K/UL (ref 0–0.4)
EOSINOPHIL NFR BLD: 1 % (ref 0–7)
ERYTHROCYTE [DISTWIDTH] IN BLOOD BY AUTOMATED COUNT: 14.1 % (ref 11.5–14.5)
GLOBULIN SER CALC-MCNC: 2.5 G/DL (ref 2–4)
GLUCOSE SERPL-MCNC: 91 MG/DL (ref 65–100)
HCT VFR BLD AUTO: 41.2 % (ref 36.6–50.3)
HGB BLD-MCNC: 13.7 G/DL (ref 12.1–17)
IMM GRANULOCYTES # BLD: 0 K/UL (ref 0–0.04)
IMM GRANULOCYTES NFR BLD AUTO: 0 % (ref 0–0.5)
LYMPHOCYTES # BLD: 1.8 K/UL (ref 0.8–3.5)
LYMPHOCYTES NFR BLD: 19 % (ref 12–49)
MCH RBC QN AUTO: 30.3 PG (ref 26–34)
MCHC RBC AUTO-ENTMCNC: 33.3 G/DL (ref 30–36.5)
MCV RBC AUTO: 91.2 FL (ref 80–99)
MONOCYTES # BLD: 1 K/UL (ref 0–1)
MONOCYTES NFR BLD: 11 % (ref 5–13)
NEUTS SEG # BLD: 6.5 K/UL (ref 1.8–8)
NEUTS SEG NFR BLD: 69 % (ref 32–75)
NRBC # BLD: 0 K/UL (ref 0–0.01)
NRBC BLD-RTO: 0 PER 100 WBC
PLATELET # BLD AUTO: 263 K/UL (ref 150–400)
PMV BLD AUTO: 10 FL (ref 8.9–12.9)
POTASSIUM SERPL-SCNC: 4.7 MMOL/L (ref 3.5–5.1)
PROT SERPL-MCNC: 5.8 G/DL (ref 6.4–8.2)
RBC # BLD AUTO: 4.52 M/UL (ref 4.1–5.7)
SODIUM SERPL-SCNC: 140 MMOL/L (ref 136–145)
WBC # BLD AUTO: 9.4 K/UL (ref 4.1–11.1)

## 2018-06-15 PROCEDURE — 36415 COLL VENOUS BLD VENIPUNCTURE: CPT | Performed by: OTOLARYNGOLOGY

## 2018-06-15 PROCEDURE — 80053 COMPREHEN METABOLIC PANEL: CPT | Performed by: OTOLARYNGOLOGY

## 2018-06-15 PROCEDURE — 85025 COMPLETE CBC W/AUTO DIFF WBC: CPT | Performed by: OTOLARYNGOLOGY

## 2018-06-16 ENCOUNTER — ANESTHESIA EVENT (OUTPATIENT)
Dept: SURGERY | Age: 83
DRG: 464 | End: 2018-06-16
Payer: MEDICARE

## 2018-06-16 NOTE — ANESTHESIA PREPROCEDURE EVALUATION
Anesthetic History   No history of anesthetic complications            Review of Systems / Medical History  Patient summary reviewed, nursing notes reviewed and pertinent labs reviewed    Pulmonary  Within defined limits                 Neuro/Psych   Within defined limits           Cardiovascular    Hypertension          Past MI, CAD and cardiac stents         GI/Hepatic/Renal  Within defined limits              Endo/Other      Hypothyroidism  Arthritis and cancer     Other Findings            Physical Exam    Airway  Mallampati: II  TM Distance: > 6 cm  Neck ROM: normal range of motion   Mouth opening: Normal     Cardiovascular  Regular rate and rhythm,  S1 and S2 normal,  no murmur, click, rub, or gallop             Dental  No notable dental hx       Pulmonary  Breath sounds clear to auscultation               Abdominal  GI exam deferred       Other Findings            Anesthetic Plan    ASA: 3  Anesthesia type: general          Induction: Intravenous  Anesthetic plan and risks discussed with: Patient

## 2018-06-18 ENCOUNTER — ANESTHESIA (OUTPATIENT)
Dept: SURGERY | Age: 83
DRG: 464 | End: 2018-06-18
Payer: MEDICARE

## 2018-06-18 ENCOUNTER — HOSPITAL ENCOUNTER (INPATIENT)
Age: 83
LOS: 1 days | Discharge: HOME | DRG: 464 | End: 2018-06-19
Attending: OTOLARYNGOLOGY | Admitting: OTOLARYNGOLOGY
Payer: MEDICARE

## 2018-06-18 PROBLEM — C44.42 SCC (SQUAMOUS CELL CARCINOMA), SCALP/NECK: Status: ACTIVE | Noted: 2018-06-18

## 2018-06-18 LAB
ABO + RH BLD: NORMAL
BLOOD GROUP ANTIBODIES SERPL: NORMAL
SPECIMEN EXP DATE BLD: NORMAL

## 2018-06-18 PROCEDURE — 77030032490 HC SLV COMPR SCD KNE COVD -B: Performed by: OTOLARYNGOLOGY

## 2018-06-18 PROCEDURE — 76010000149 HC OR TIME 1 TO 1.5 HR: Performed by: OTOLARYNGOLOGY

## 2018-06-18 PROCEDURE — 88305 TISSUE EXAM BY PATHOLOGIST: CPT | Performed by: OTOLARYNGOLOGY

## 2018-06-18 PROCEDURE — 74011250636 HC RX REV CODE- 250/636

## 2018-06-18 PROCEDURE — 74011250636 HC RX REV CODE- 250/636: Performed by: ANESTHESIOLOGY

## 2018-06-18 PROCEDURE — C9363 INTEGRA MESHED BIL WOUND MAT: HCPCS | Performed by: OTOLARYNGOLOGY

## 2018-06-18 PROCEDURE — 74011000272 HC RX REV CODE- 272: Performed by: OTOLARYNGOLOGY

## 2018-06-18 PROCEDURE — 76010000161 HC OR TIME 1 TO 1.5 HR INTENSV-TIER 1: Performed by: OTOLARYNGOLOGY

## 2018-06-18 PROCEDURE — 76060000033 HC ANESTHESIA 1 TO 1.5 HR: Performed by: OTOLARYNGOLOGY

## 2018-06-18 PROCEDURE — 74011250637 HC RX REV CODE- 250/637: Performed by: OTOLARYNGOLOGY

## 2018-06-18 PROCEDURE — 0NB00ZX EXCISION OF SKULL, OPEN APPROACH, DIAGNOSTIC: ICD-10-PCS | Performed by: NEUROLOGICAL SURGERY

## 2018-06-18 PROCEDURE — 77030011640 HC PAD GRND REM COVD -A: Performed by: OTOLARYNGOLOGY

## 2018-06-18 PROCEDURE — 65270000032 HC RM SEMIPRIVATE

## 2018-06-18 PROCEDURE — 77030014007 HC SPNG HEMSTAT J&J -B: Performed by: OTOLARYNGOLOGY

## 2018-06-18 PROCEDURE — 00520ZZ DESTRUCTION OF DURA MATER, OPEN APPROACH: ICD-10-PCS | Performed by: NEUROLOGICAL SURGERY

## 2018-06-18 PROCEDURE — 74011000250 HC RX REV CODE- 250

## 2018-06-18 PROCEDURE — 77030004391 HC BUR FLUT MEDT -C: Performed by: OTOLARYNGOLOGY

## 2018-06-18 PROCEDURE — 36415 COLL VENOUS BLD VENIPUNCTURE: CPT | Performed by: ANESTHESIOLOGY

## 2018-06-18 PROCEDURE — 00U20KZ SUPPLEMENT DURA MATER WITH NONAUTOLOGOUS TISSUE SUBSTITUTE, OPEN APPROACH: ICD-10-PCS | Performed by: OTOLARYNGOLOGY

## 2018-06-18 PROCEDURE — 77030013218 HC SUPP FACE BSNM -B: Performed by: OTOLARYNGOLOGY

## 2018-06-18 PROCEDURE — 86900 BLOOD TYPING SEROLOGIC ABO: CPT | Performed by: ANESTHESIOLOGY

## 2018-06-18 PROCEDURE — 76210000006 HC OR PH I REC 0.5 TO 1 HR: Performed by: OTOLARYNGOLOGY

## 2018-06-18 PROCEDURE — 74011258636 HC RX REV CODE- 258/636: Performed by: OTOLARYNGOLOGY

## 2018-06-18 PROCEDURE — 77030020269 HC MISC IMPL: Performed by: OTOLARYNGOLOGY

## 2018-06-18 PROCEDURE — 77030003892 HC BIT DRL TWST MEDT -B: Performed by: OTOLARYNGOLOGY

## 2018-06-18 PROCEDURE — 77030020782 HC GWN BAIR PAWS FLX 3M -B

## 2018-06-18 PROCEDURE — 77030014647 HC SEAL FBRN TISSL BAXT -D: Performed by: OTOLARYNGOLOGY

## 2018-06-18 PROCEDURE — 0NB10ZZ EXCISION OF FRONTAL BONE, OPEN APPROACH: ICD-10-PCS | Performed by: NEUROLOGICAL SURGERY

## 2018-06-18 PROCEDURE — 74011000250 HC RX REV CODE- 250: Performed by: OTOLARYNGOLOGY

## 2018-06-18 PROCEDURE — 0HR0XK3 REPLACEMENT OF SCALP SKIN WITH NONAUTOLOGOUS TISSUE SUBSTITUTE, FULL THICKNESS, EXTERNAL APPROACH: ICD-10-PCS | Performed by: OTOLARYNGOLOGY

## 2018-06-18 DEVICE — INTEGRA® MESHED BILAYER WOUND MATRIX 4 IN*5 IN (10 CM*12.5 CM)
Type: IMPLANTABLE DEVICE | Site: SKULL | Status: FUNCTIONAL
Brand: INTEGRA®

## 2018-06-18 RX ORDER — SODIUM CHLORIDE 0.9 % (FLUSH) 0.9 %
5-10 SYRINGE (ML) INJECTION AS NEEDED
Status: DISCONTINUED | OUTPATIENT
Start: 2018-06-18 | End: 2018-06-18 | Stop reason: HOSPADM

## 2018-06-18 RX ORDER — FENTANYL CITRATE 50 UG/ML
INJECTION, SOLUTION INTRAMUSCULAR; INTRAVENOUS AS NEEDED
Status: DISCONTINUED | OUTPATIENT
Start: 2018-06-18 | End: 2018-06-18 | Stop reason: HOSPADM

## 2018-06-18 RX ORDER — SODIUM CHLORIDE 0.9 % (FLUSH) 0.9 %
5-10 SYRINGE (ML) INJECTION EVERY 8 HOURS
Status: DISCONTINUED | OUTPATIENT
Start: 2018-06-18 | End: 2018-06-18 | Stop reason: HOSPADM

## 2018-06-18 RX ORDER — SODIUM CHLORIDE 9 MG/ML
250 INJECTION, SOLUTION INTRAVENOUS AS NEEDED
Status: DISCONTINUED | OUTPATIENT
Start: 2018-06-18 | End: 2018-06-18 | Stop reason: HOSPADM

## 2018-06-18 RX ORDER — ASPIRIN 81 MG/1
81 TABLET ORAL
Status: DISCONTINUED | OUTPATIENT
Start: 2018-06-18 | End: 2018-06-19 | Stop reason: HOSPADM

## 2018-06-18 RX ORDER — ONDANSETRON 2 MG/ML
INJECTION INTRAMUSCULAR; INTRAVENOUS AS NEEDED
Status: DISCONTINUED | OUTPATIENT
Start: 2018-06-18 | End: 2018-06-18 | Stop reason: HOSPADM

## 2018-06-18 RX ORDER — AMLODIPINE BESYLATE 5 MG/1
2.5 TABLET ORAL DAILY
Status: DISCONTINUED | OUTPATIENT
Start: 2018-06-18 | End: 2018-06-19 | Stop reason: HOSPADM

## 2018-06-18 RX ORDER — TIMOLOL MALEATE 5 MG/ML
1 SOLUTION/ DROPS OPHTHALMIC 2 TIMES DAILY
Status: DISCONTINUED | OUTPATIENT
Start: 2018-06-18 | End: 2018-06-19 | Stop reason: HOSPADM

## 2018-06-18 RX ORDER — SUCCINYLCHOLINE CHLORIDE 20 MG/ML
INJECTION INTRAMUSCULAR; INTRAVENOUS AS NEEDED
Status: DISCONTINUED | OUTPATIENT
Start: 2018-06-18 | End: 2018-06-18 | Stop reason: HOSPADM

## 2018-06-18 RX ORDER — DORZOLAMIDE HCL 20 MG/ML
1 SOLUTION/ DROPS OPHTHALMIC EVERY 12 HOURS
Status: DISCONTINUED | OUTPATIENT
Start: 2018-06-18 | End: 2018-06-19 | Stop reason: HOSPADM

## 2018-06-18 RX ORDER — MIDAZOLAM HYDROCHLORIDE 1 MG/ML
INJECTION, SOLUTION INTRAMUSCULAR; INTRAVENOUS AS NEEDED
Status: DISCONTINUED | OUTPATIENT
Start: 2018-06-18 | End: 2018-06-18 | Stop reason: HOSPADM

## 2018-06-18 RX ORDER — DIPHENHYDRAMINE HYDROCHLORIDE 50 MG/ML
12.5 INJECTION, SOLUTION INTRAMUSCULAR; INTRAVENOUS AS NEEDED
Status: DISCONTINUED | OUTPATIENT
Start: 2018-06-18 | End: 2018-06-18 | Stop reason: HOSPADM

## 2018-06-18 RX ORDER — CEFAZOLIN SODIUM IN 0.9 % NACL 2 G/100 ML
PLASTIC BAG, INJECTION (ML) INTRAVENOUS AS NEEDED
Status: DISCONTINUED | OUTPATIENT
Start: 2018-06-18 | End: 2018-06-18 | Stop reason: HOSPADM

## 2018-06-18 RX ORDER — SODIUM CHLORIDE, SODIUM LACTATE, POTASSIUM CHLORIDE, CALCIUM CHLORIDE 600; 310; 30; 20 MG/100ML; MG/100ML; MG/100ML; MG/100ML
100 INJECTION, SOLUTION INTRAVENOUS CONTINUOUS
Status: DISCONTINUED | OUTPATIENT
Start: 2018-06-18 | End: 2018-06-18 | Stop reason: HOSPADM

## 2018-06-18 RX ORDER — ONDANSETRON 2 MG/ML
4 INJECTION INTRAMUSCULAR; INTRAVENOUS AS NEEDED
Status: DISCONTINUED | OUTPATIENT
Start: 2018-06-18 | End: 2018-06-18 | Stop reason: HOSPADM

## 2018-06-18 RX ORDER — GLYCOPYRROLATE 0.2 MG/ML
INJECTION INTRAMUSCULAR; INTRAVENOUS AS NEEDED
Status: DISCONTINUED | OUTPATIENT
Start: 2018-06-18 | End: 2018-06-18 | Stop reason: HOSPADM

## 2018-06-18 RX ORDER — HYDROCODONE BITARTRATE AND ACETAMINOPHEN 5; 325 MG/1; MG/1
1 TABLET ORAL
Status: DISCONTINUED | OUTPATIENT
Start: 2018-06-18 | End: 2018-06-19 | Stop reason: HOSPADM

## 2018-06-18 RX ORDER — SODIUM CHLORIDE, SODIUM LACTATE, POTASSIUM CHLORIDE, CALCIUM CHLORIDE 600; 310; 30; 20 MG/100ML; MG/100ML; MG/100ML; MG/100ML
INJECTION, SOLUTION INTRAVENOUS
Status: DISCONTINUED | OUTPATIENT
Start: 2018-06-18 | End: 2018-06-18 | Stop reason: HOSPADM

## 2018-06-18 RX ORDER — LEVOTHYROXINE SODIUM 50 UG/1
25 TABLET ORAL
Status: DISCONTINUED | OUTPATIENT
Start: 2018-06-19 | End: 2018-06-19 | Stop reason: HOSPADM

## 2018-06-18 RX ORDER — TAMSULOSIN HYDROCHLORIDE 0.4 MG/1
0.4 CAPSULE ORAL
Status: DISCONTINUED | OUTPATIENT
Start: 2018-06-18 | End: 2018-06-19 | Stop reason: HOSPADM

## 2018-06-18 RX ORDER — OXYCODONE HYDROCHLORIDE 5 MG/1
5 TABLET ORAL AS NEEDED
Status: DISCONTINUED | OUTPATIENT
Start: 2018-06-18 | End: 2018-06-18 | Stop reason: HOSPADM

## 2018-06-18 RX ORDER — MELATONIN
1000
Status: DISCONTINUED | OUTPATIENT
Start: 2018-06-19 | End: 2018-06-19 | Stop reason: HOSPADM

## 2018-06-18 RX ORDER — FENTANYL CITRATE 50 UG/ML
50 INJECTION, SOLUTION INTRAMUSCULAR; INTRAVENOUS AS NEEDED
Status: DISCONTINUED | OUTPATIENT
Start: 2018-06-18 | End: 2018-06-18 | Stop reason: HOSPADM

## 2018-06-18 RX ORDER — ISOSORBIDE MONONITRATE 20 MG/1
20 TABLET ORAL 2 TIMES DAILY
Status: DISCONTINUED | OUTPATIENT
Start: 2018-06-18 | End: 2018-06-19 | Stop reason: HOSPADM

## 2018-06-18 RX ORDER — DEXTROSE, SODIUM CHLORIDE, SODIUM LACTATE, POTASSIUM CHLORIDE, AND CALCIUM CHLORIDE 5; .6; .31; .03; .02 G/100ML; G/100ML; G/100ML; G/100ML; G/100ML
100 INJECTION, SOLUTION INTRAVENOUS CONTINUOUS
Status: DISPENSED | OUTPATIENT
Start: 2018-06-18 | End: 2018-06-19

## 2018-06-18 RX ORDER — SODIUM CHLORIDE 9 MG/ML
25 INJECTION, SOLUTION INTRAVENOUS CONTINUOUS
Status: DISPENSED | OUTPATIENT
Start: 2018-06-18 | End: 2018-06-19

## 2018-06-18 RX ORDER — ROPIVACAINE HYDROCHLORIDE 5 MG/ML
150 INJECTION, SOLUTION EPIDURAL; INFILTRATION; PERINEURAL AS NEEDED
Status: DISCONTINUED | OUTPATIENT
Start: 2018-06-18 | End: 2018-06-18 | Stop reason: HOSPADM

## 2018-06-18 RX ORDER — PROPOFOL 10 MG/ML
INJECTION, EMULSION INTRAVENOUS AS NEEDED
Status: DISCONTINUED | OUTPATIENT
Start: 2018-06-18 | End: 2018-06-18 | Stop reason: HOSPADM

## 2018-06-18 RX ORDER — FINASTERIDE 5 MG/1
5 TABLET, FILM COATED ORAL DAILY
Status: DISCONTINUED | OUTPATIENT
Start: 2018-06-18 | End: 2018-06-19 | Stop reason: HOSPADM

## 2018-06-18 RX ORDER — MIDAZOLAM HYDROCHLORIDE 1 MG/ML
0.5 INJECTION, SOLUTION INTRAMUSCULAR; INTRAVENOUS
Status: DISCONTINUED | OUTPATIENT
Start: 2018-06-18 | End: 2018-06-18 | Stop reason: HOSPADM

## 2018-06-18 RX ORDER — CEFAZOLIN SODIUM/WATER 2 G/20 ML
2 SYRINGE (ML) INTRAVENOUS ONCE
Status: DISCONTINUED | OUTPATIENT
Start: 2018-06-18 | End: 2018-06-18 | Stop reason: HOSPADM

## 2018-06-18 RX ORDER — LISINOPRIL 10 MG/1
10 TABLET ORAL DAILY
Status: DISCONTINUED | OUTPATIENT
Start: 2018-06-18 | End: 2018-06-19 | Stop reason: HOSPADM

## 2018-06-18 RX ORDER — FENTANYL CITRATE 50 UG/ML
25 INJECTION, SOLUTION INTRAMUSCULAR; INTRAVENOUS
Status: DISCONTINUED | OUTPATIENT
Start: 2018-06-18 | End: 2018-06-18 | Stop reason: HOSPADM

## 2018-06-18 RX ORDER — LIDOCAINE HYDROCHLORIDE 10 MG/ML
0.1 INJECTION, SOLUTION EPIDURAL; INFILTRATION; INTRACAUDAL; PERINEURAL AS NEEDED
Status: DISCONTINUED | OUTPATIENT
Start: 2018-06-18 | End: 2018-06-18 | Stop reason: HOSPADM

## 2018-06-18 RX ORDER — HYDRALAZINE HYDROCHLORIDE 20 MG/ML
INJECTION INTRAMUSCULAR; INTRAVENOUS AS NEEDED
Status: DISCONTINUED | OUTPATIENT
Start: 2018-06-18 | End: 2018-06-18 | Stop reason: HOSPADM

## 2018-06-18 RX ORDER — ROCURONIUM BROMIDE 10 MG/ML
INJECTION, SOLUTION INTRAVENOUS AS NEEDED
Status: DISCONTINUED | OUTPATIENT
Start: 2018-06-18 | End: 2018-06-18 | Stop reason: HOSPADM

## 2018-06-18 RX ORDER — SODIUM CHLORIDE 9 MG/ML
25 INJECTION, SOLUTION INTRAVENOUS CONTINUOUS
Status: DISCONTINUED | OUTPATIENT
Start: 2018-06-18 | End: 2018-06-19 | Stop reason: HOSPADM

## 2018-06-18 RX ORDER — MIDAZOLAM HYDROCHLORIDE 1 MG/ML
1 INJECTION, SOLUTION INTRAMUSCULAR; INTRAVENOUS AS NEEDED
Status: DISCONTINUED | OUTPATIENT
Start: 2018-06-18 | End: 2018-06-18 | Stop reason: HOSPADM

## 2018-06-18 RX ORDER — EZETIMIBE 10 MG/1
10 TABLET ORAL DAILY
Status: DISCONTINUED | OUTPATIENT
Start: 2018-06-18 | End: 2018-06-19 | Stop reason: HOSPADM

## 2018-06-18 RX ORDER — LIDOCAINE HYDROCHLORIDE 20 MG/ML
INJECTION, SOLUTION EPIDURAL; INFILTRATION; INTRACAUDAL; PERINEURAL AS NEEDED
Status: DISCONTINUED | OUTPATIENT
Start: 2018-06-18 | End: 2018-06-18 | Stop reason: HOSPADM

## 2018-06-18 RX ORDER — NEOSTIGMINE METHYLSULFATE 1 MG/ML
INJECTION INTRAVENOUS AS NEEDED
Status: DISCONTINUED | OUTPATIENT
Start: 2018-06-18 | End: 2018-06-18 | Stop reason: HOSPADM

## 2018-06-18 RX ORDER — LATANOPROST 50 UG/ML
1 SOLUTION/ DROPS OPHTHALMIC
Status: DISCONTINUED | OUTPATIENT
Start: 2018-06-18 | End: 2018-06-19 | Stop reason: HOSPADM

## 2018-06-18 RX ORDER — NITROGLYCERIN 0.4 MG/1
0.4 TABLET SUBLINGUAL
Status: DISCONTINUED | OUTPATIENT
Start: 2018-06-18 | End: 2018-06-19 | Stop reason: HOSPADM

## 2018-06-18 RX ORDER — SODIUM CHLORIDE, SODIUM LACTATE, POTASSIUM CHLORIDE, CALCIUM CHLORIDE 600; 310; 30; 20 MG/100ML; MG/100ML; MG/100ML; MG/100ML
1000 INJECTION, SOLUTION INTRAVENOUS CONTINUOUS
Status: DISCONTINUED | OUTPATIENT
Start: 2018-06-18 | End: 2018-06-18 | Stop reason: HOSPADM

## 2018-06-18 RX ORDER — DEXAMETHASONE SODIUM PHOSPHATE 4 MG/ML
INJECTION, SOLUTION INTRA-ARTICULAR; INTRALESIONAL; INTRAMUSCULAR; INTRAVENOUS; SOFT TISSUE AS NEEDED
Status: DISCONTINUED | OUTPATIENT
Start: 2018-06-18 | End: 2018-06-18 | Stop reason: HOSPADM

## 2018-06-18 RX ORDER — MORPHINE SULFATE 10 MG/ML
2 INJECTION, SOLUTION INTRAMUSCULAR; INTRAVENOUS
Status: DISCONTINUED | OUTPATIENT
Start: 2018-06-18 | End: 2018-06-18 | Stop reason: HOSPADM

## 2018-06-18 RX ORDER — BRIMONIDINE TARTRATE 2 MG/ML
1 SOLUTION/ DROPS OPHTHALMIC EVERY 12 HOURS
Status: DISCONTINUED | OUTPATIENT
Start: 2018-06-18 | End: 2018-06-19 | Stop reason: HOSPADM

## 2018-06-18 RX ADMIN — ROCURONIUM BROMIDE 10 MG: 10 INJECTION, SOLUTION INTRAVENOUS at 07:42

## 2018-06-18 RX ADMIN — SODIUM CHLORIDE, SODIUM LACTATE, POTASSIUM CHLORIDE, CALCIUM CHLORIDE, AND DEXTROSE MONOHYDRATE 100 ML/HR: 600; 310; 30; 20; 5 INJECTION, SOLUTION INTRAVENOUS at 09:29

## 2018-06-18 RX ADMIN — SODIUM CHLORIDE, SODIUM LACTATE, POTASSIUM CHLORIDE, CALCIUM CHLORIDE: 600; 310; 30; 20 INJECTION, SOLUTION INTRAVENOUS at 07:35

## 2018-06-18 RX ADMIN — PROPOFOL 140 MG: 10 INJECTION, EMULSION INTRAVENOUS at 07:42

## 2018-06-18 RX ADMIN — SODIUM CHLORIDE, SODIUM LACTATE, POTASSIUM CHLORIDE, CALCIUM CHLORIDE: 600; 310; 30; 20 INJECTION, SOLUTION INTRAVENOUS at 07:47

## 2018-06-18 RX ADMIN — LISINOPRIL 10 MG: 10 TABLET ORAL at 12:36

## 2018-06-18 RX ADMIN — SODIUM CHLORIDE, SODIUM LACTATE, POTASSIUM CHLORIDE, CALCIUM CHLORIDE, AND DEXTROSE MONOHYDRATE 100 ML/HR: 600; 310; 30; 20; 5 INJECTION, SOLUTION INTRAVENOUS at 22:37

## 2018-06-18 RX ADMIN — TIMOLOL MALEATE 1 DROP: 5 SOLUTION OPHTHALMIC at 18:41

## 2018-06-18 RX ADMIN — GLYCOPYRROLATE 0.6 MG: 0.2 INJECTION INTRAMUSCULAR; INTRAVENOUS at 08:26

## 2018-06-18 RX ADMIN — MIDAZOLAM HYDROCHLORIDE 2 MG: 1 INJECTION, SOLUTION INTRAMUSCULAR; INTRAVENOUS at 07:35

## 2018-06-18 RX ADMIN — HYDRALAZINE HYDROCHLORIDE 4 MG: 20 INJECTION INTRAMUSCULAR; INTRAVENOUS at 08:59

## 2018-06-18 RX ADMIN — EZETIMIBE 10 MG: 10 TABLET ORAL at 12:36

## 2018-06-18 RX ADMIN — ASPIRIN 81 MG: 81 TABLET, COATED ORAL at 22:36

## 2018-06-18 RX ADMIN — ISOSORBIDE MONONITRATE 20 MG: 20 TABLET ORAL at 18:41

## 2018-06-18 RX ADMIN — TIMOLOL MALEATE 1 DROP: 5 SOLUTION OPHTHALMIC at 12:44

## 2018-06-18 RX ADMIN — NEOSTIGMINE METHYLSULFATE 4 MG: 1 INJECTION INTRAVENOUS at 08:26

## 2018-06-18 RX ADMIN — ONDANSETRON 4 MG: 2 INJECTION INTRAMUSCULAR; INTRAVENOUS at 08:00

## 2018-06-18 RX ADMIN — SUCCINYLCHOLINE CHLORIDE 100 MG: 20 INJECTION INTRAMUSCULAR; INTRAVENOUS at 07:42

## 2018-06-18 RX ADMIN — FINASTERIDE 5 MG: 5 TABLET, FILM COATED ORAL at 12:43

## 2018-06-18 RX ADMIN — LIDOCAINE HYDROCHLORIDE 40 MG: 20 INJECTION, SOLUTION EPIDURAL; INFILTRATION; INTRACAUDAL; PERINEURAL at 07:42

## 2018-06-18 RX ADMIN — ROCURONIUM BROMIDE 20 MG: 10 INJECTION, SOLUTION INTRAVENOUS at 07:47

## 2018-06-18 RX ADMIN — FENTANYL CITRATE 75 MCG: 50 INJECTION, SOLUTION INTRAMUSCULAR; INTRAVENOUS at 07:42

## 2018-06-18 RX ADMIN — AMLODIPINE BESYLATE 2.5 MG: 5 TABLET ORAL at 12:37

## 2018-06-18 RX ADMIN — SODIUM CHLORIDE, SODIUM LACTATE, POTASSIUM CHLORIDE, AND CALCIUM CHLORIDE 1000 ML: 600; 310; 30; 20 INJECTION, SOLUTION INTRAVENOUS at 07:10

## 2018-06-18 RX ADMIN — ISOSORBIDE MONONITRATE 20 MG: 20 TABLET ORAL at 12:36

## 2018-06-18 RX ADMIN — TAMSULOSIN HYDROCHLORIDE 0.4 MG: 0.4 CAPSULE ORAL at 22:36

## 2018-06-18 RX ADMIN — Medication 2 G: at 07:45

## 2018-06-18 RX ADMIN — DEXAMETHASONE SODIUM PHOSPHATE 4 MG: 4 INJECTION, SOLUTION INTRA-ARTICULAR; INTRALESIONAL; INTRAMUSCULAR; INTRAVENOUS; SOFT TISSUE at 07:47

## 2018-06-18 NOTE — OP NOTES
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 POSTOPERATIVE DIAGNOSIS:  Squamous cell carcinoma of the calvarium  through the skull. PROCEDURE PERFORMED:  Frontal craniectomy, with resection of intracalvarial squamous cell carcinoma, excision of bony tumor. SURGEON:  Kenn Heaton MD    ASSISTANT:  Micaela Adrian MD     ANESTHESIA:  General endotracheal anesthesia. ESTIMATED BLOOD LOSS:  About 10 mL. COMPLICATIONS:  None. SPECIMENS REMOVED:  Tumor for permanent section sent. IMPLANTS:  None. OPERATIVE INDICATIONS:  This is an 22-year-old gentleman with multiple recurrent squamous cell carcinoma of the scalp/skull. He has had multiple treatments with skin grafts, Mohs surgery and debridements. He developed invasion of the frontal vertex with in a region of previously debrided scalp. After discussing treatment options and risks of surgery, it was decided to proceed with excision of the bony calvarium tumor resection and, wound closure was a skin Xenograft. The scalp and wound portion of the procedure will be dictated by Dr. Micaela Adrian, for which I assisted. OPERATION IN DETAIL:  The patient was taken to the operating room and placed under general endotracheal anesthesia. All necessary lines and monitors were placed. He was given appropriate dose of IV antibiotics. SCDs were placed. He was placed supine on the operating table. All pressure points were padded. The region of previous scalp excision was identified, and the vertex was prepped and draped in a standard sterile fashion. I curetted the underlying skull and debrided some granulation tissue. I immediately encountered clear tumor within the outer and inside the skull.   Some of this was sent for permanent section. I then used the high-speed [employer] Mike drill to perform an intraosseous tumor removal and craniectomy. There was one area where the tumor looked like it did go all the way through the inner table down to the dura. This was excised. I performed a craniectomy down to the dura just left of the midline and sagittal sinus. There did appear to be a little bit of tumor on the dura itself. This was aggressively cauterized with bipolar electrocautery circumferentially and through the tumor itself. I enlarged the craniectomy and drilled back to what appeared to be normal bone throughout. The wound was copiously irrigated. Some bone wax was used for hemostasis. An antibiotic solution was used. Dr. Jamey Hernandez then performed the scalp closure, which I assisted in. MD ARELI Groves / RAFI  D: 06/18/2018 09:05     T: 06/18/2018 09:29  JOB #: [id number]  CC: Cary Blackwell MD  CC: Angela Da Silva MD  CC: Nic Tinoco MD

## 2018-06-18 NOTE — ROUTINE PROCESS
Primary Nurse David Fofana RN and Anjana Lewis RN performed a dual skin assessment on this patient Impairment noted- see wound doc flow sheet  Amiclar score is 20    Surgical wound front of scalp, covered with gauze.

## 2018-06-18 NOTE — ANESTHESIA POSTPROCEDURE EVALUATION
Post-Anesthesia Evaluation and Assessment    Patient: Misty Saxena MRN: [id number]  SSN: [id number]    YOB: 1929  Age: 80 y.o. Sex: male       Cardiovascular Function/Vital Signs  Visit Vitals    /53    Pulse (!) 52    Temp 36.4 °C (97.5 °F)    Resp 16    Wt 77.1 kg (170 lb)    SpO2 97%    BMI 30.11 kg/m2       Patient is status post general anesthesia for Procedure(s):  CRANIAL LESION RESECTION WITH SKIN XENOGRAFT, SURGICAL PREPARATION FOR CREATION RECEPIENT SITE BY EXCISION OF WOUND, FRONTAL CRANIECTOMY WITH RESECTION OF TUMOR. Nausea/Vomiting: None    Postoperative hydration reviewed and adequate. Pain:  Pain Scale 1: Numeric (0 - 10) (06/18/18 0944)  Pain Intensity 1: 0 (06/18/18 0944)   Managed    Neurological Status:   Neuro (WDL): Within Defined Limits (06/18/18 0945)  Neuro  LUE Motor Response: Purposeful (06/18/18 0945)  LLE Motor Response: Purposeful (06/18/18 0945)  RUE Motor Response: Purposeful (06/18/18 0945)  RLE Motor Response: Purposeful (06/18/18 0945)   At baseline    Mental Status and Level of Consciousness: Arousable    Pulmonary Status:   O2 Device: Nasal cannula (06/18/18 0948)   Adequate oxygenation and airway patent    Complications related to anesthesia: None    Post-anesthesia assessment completed.  No concerns    Signed By: Josiane Mcpherson MD     June 18, 2018

## 2018-06-18 NOTE — H&P
History and Physical    Subjective:     Ad Gray is a 80 y.o.  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 IMaging suggests inner table and intracranial soft tissue unninvolved. Past Medical History:   Diagnosis Date    Arthritis     SHOULDERS AND SPINE    CAD (coronary artery disease) 7/1998    MI CARDIAC CATH, STENT X 2    Cancer ([address] 04/2008    chronic lymphocytic leukemia    Cancer ([address]     multiple skin cancers     Chest pain, unspecified     Chronic pain     L 2 LYTIC LESION    Edema     Hypertension     Ill-defined condition     GLUCOMA    MI (myocardial infarction) ([address]     Postsurgical percutaneous transluminal coronary angioplasty status 6/4/2012    Pre-operative cardiovascular examination     Thyroid disease     HYPO      Past Surgical History:   Procedure Laterality Date    CARDIAC SURG PROCEDURE UNLIST  7/1998    2 Cardiac stents    EGD  6/7/2010         HX ADENOIDECTOMY  1936    HX APPENDECTOMY  1954    HX CATARACT REMOVAL      bilateral    HX ORTHOPAEDIC  4/15/2008    Right hip replacement    HX OTHER SURGICAL  5/2011    Moh's procedure,     HX OTHER SURGICAL      MULTIPLES SKIN CANCER REMOVALS, MOSTLY ON HEAD    HX SKIN BIOPSY  04/27/2018    on scalp     HX TONSILLECTOMY  1936    1936     Family History   Problem Relation Age of Onset    Diabetes Mother     Heart Disease Father     Heart Attack Father 80    Lung Disease Sister      PULMONARY FIBROSIS    Heart Attack Brother     No Known Problems Sister     Anesth Problems Neg Hx       Social History   Substance Use Topics    Smoking status: Former Smoker     Packs/day: 0.50     Years: 15.00     Quit date: 12/31/1974    Smokeless tobacco: Never Used    Alcohol use Yes      Comment: RARELY       Prior to Admission medications    Medication Sig Start Date End Date Taking?  Authorizing Provider isosorbide mononitrate (ISMO, MONOKET) 20 mg tablet Take 1 Tab by mouth two (2) times a day. Patient taking differently: Take 20 mg by mouth two (2) times a day. PT STATES HE NEEDS MEDS EXACTLY AT 7 AM & 2 PM 6/7/18   Shashi Mckinney NP   ezetimibe (ZETIA) 10 mg tablet TAKE ONE TABLET BY MOUTH AT NIGHT 6/4/18   Larry Lion NP   nitroglycerin (NITROSTAT) 0.4 mg SL tablet 1 Tab by SubLINGual route every five (5) minutes as needed for Chest Pain (max 3 tabs daily for chest pain). Up to 3 doses. 5/11/18   Larry Lion NP   amLODIPine (NORVASC) 2.5 mg tablet Take 2.5 mg by mouth daily. Historical Provider   lisinopril (PRINIVIL, ZESTRIL) 10 mg tablet Take 10 mg by mouth daily. Historical Provider   aspirin delayed-release 81 mg tablet Take 81 mg by mouth nightly. Historical Provider   cholecalciferol (VITAMIN D3) 1,000 unit tablet Take 1,000 Units by mouth daily (with breakfast). Historical Provider   timolol (TIMOPTIC) 0.5 % ophthalmic solution Administer 1 Drop to both eyes two (2) times a day. Historical Provider   HYDROcodone-acetaminophen (NORCO) 5-325 mg per tablet Take 1 Tab by mouth every six (6) hours as needed for Pain. Fidelina Carmona MD   bimatoprost (LUMIGAN) 0.03 % ophthalmic drops Administer 1 Drop to both eyes nightly. Historical Provider   acetaminophen (TYLENOL EXTRA STRENGTH) 500 mg tablet Take  by mouth every six (6) hours as needed for Pain. Historical Provider   brinzolamide-brimonidine [facility]) 1-0.2 % drps Apply 1 Drop to eye two (2) times a day. Historical Provider   finasteride (PROSCAR) 5 mg tablet Take 5 mg by mouth daily. Historical Provider   silodosin (RAPAFLO) 8 mg capsule Take 8 mg by mouth nightly. Historical Provider   levothyroxine (SYNTHROID) 25 mcg tablet Take 25 mcg by mouth Daily (before breakfast).     Historical Provider     Allergies   Allergen Reactions    Statins-Hmg-Coa Reductase Inhibitors Myalgia and Other (comments)     Causes aching, generalized        Review of Systems:  A comprehensive review of systems was negative except for that written in the History of Present Illness. Objective: Intake and Output:            Physical Exam:   Visit Vitals    /55 (BP 1 Location: Right arm, BP Patient Position: At rest)    Pulse (!) 52    Temp 97.5 °F (36.4 °C)    Resp 20    Wt 77.1 kg (170 lb)    SpO2 98%    BMI 30.11 kg/m2     General:  Alert, cooperative, no distress, appears stated age. Head:  8 plus cm full thickness calvarial resection with central soft tissue in central wound over frontal scalp     Eyes:  Conjunctivae/corneas clear. PERRL, EOMs intact. Fundi benign   Ears:  Normal TMs and external ear canals both ears. Nose: Nares normal. Septum midline. Mucosa normal. No drainage or sinus tenderness. Throat: Lips, mucosa, and tongue normal. Teeth and gums normal.   Neck: Supple, symmetrical, trachea midline, no adenopathy, thyroid: no enlargement/tenderness/nodules, no carotid bruit and no JVD. Back:   Symmetric, no curvature. ROM normal. No CVA tenderness. Lungs:   Clear to auscultation bilaterally. Chest wall:  No tenderness or deformity. Heart:  Regular rate and rhythm, S1, S2 normal, no murmur, click, rub or gallop. Abdomen:   Soft, non-tender. Bowel sounds normal. No masses,  No organomegaly. Extremities: Extremities normal, atraumatic, no cyanosis or edema. Pulses: 2+ and symmetric all extremities. Skin: Skin color, texture, turgor normal. No rashes or lesions   Lymph nodes: Cervical, supraclavicular, and axillary nodes normal.   Neurologic: CNII-XII intact. Normal strength, sensation and reflexes throughout. Data Review:   No results found for this or any previous visit (from the past 24 hour(s)).       Assessment:     Invasive SCC scalp    Plan:     Neurosurg calvarial resection  Facial Plastics Scalp closure with xenograft    Signed By: Sunny Tucker MD     June 18, 2018

## 2018-06-18 NOTE — PERIOP NOTES
TRANSFER - OUT REPORT:    Verbal report given to josé on Kandace Johnson  being transferred to 608 (unit) for routine post - op       Report consisted of patients Situation, Background, Assessment and   Recommendations(SBAR). Time Pre op antibiotic given:2 grams ancef 745  Anesthesia Stop time: 6520  Martin Present on Transfer to floor:no  Order for Martin on Chart:no  Discharge Prescriptions with Chart:no      Information from the following report(s) SBAR, OR Summary, Procedure Summary, Intake/Output, MAR, Recent Results and Cardiac Rhythm nsr was reviewed with the receiving nurse. Opportunity for questions and clarification was provided. Is the patient on 02? YES       L/Min 2       Other     Is the patient on a monitor? NO    Is the nurse transporting with the patient? NO    Surgical Waiting Area notified of patient's transfer from PACU? YES      The following personal items collected during your admission accompanied patient upon transfer:   Dental Appliance: Dental Appliances: None  Vision:    Hearing Aid:    Jewelry: Jewelry: None  Clothing: Clothing:  (to PACU)  Other Valuables:  Other Valuables: None  Valuables sent to safe:        Clothing bag x 1 on stretcher

## 2018-06-18 NOTE — BRIEF OP NOTE
BRIEF OPERATIVE NOTE    Date of Procedure: 6/18/2018   Preoperative Diagnosis: CARCINOMA OF SCALP AND NECK  Postoperative Diagnosis: CARCINOMA OF SCALP AND NECK    Procedure(s):  CRANIAL LESION RESECTION WITH SKIN XENOGRAFT, SURGICAL PREPARATION FOR CREATION RECEPIENT SITE BY EXCISION OF WOUND, FRONTAL CRANIECTOMY WITH RESECTION OF TUMOR  Surgeon(s) and Role:     * Wilmer Looney MD , Asher Bumpers, MD- Primary             Surgical Staff:  Circ-1: Uzma Vega RN  Scrub RN-1: Maria Del Carmen Rowe RN  Surg Asst-1: Rajesh Greco  Event Time In   Incision Start 3009   Incision Close 8507     Anesthesia: General   Estimated Blood Loss: 10ml  Specimens:   ID Type Source Tests Collected by Time Destination   1 : Skull Tumor Fresh Head  Wilmer Looney MD 6/18/2018 0820 Pathology      Findings: Tumor through full thickness calvarium to dura  Complications: 0  Implants:   Implant Name Type Inv.  Item Serial No.  Lot No. LRB No. Used Action   MATRIX WND BILAYER MESH 4X5IN --  - SN/A  MATRIX WND BILAYER MESH 4X5IN --  N/A [employer] [id number] N/A 1 Implanted   Integra Membrane Allograft  Amniotic Membrane Allograft      N/A INTEGRA [id number] N/A 1 Implanted

## 2018-06-18 NOTE — PROGRESS NOTES
TRANSFER - IN REPORT:    Verbal report received from Lashawn RN(name) on Kandace Loco  being received from Pacu(unit) for routine post - op      Report consisted of patients Situation, Background, Assessment and   Recommendations(SBAR). Information from the following report(s) SBAR, OR Summary, MAR, Recent Results and Med Rec Status was reviewed with the receiving nurse. Opportunity for questions and clarification was provided. Assessment completed upon patients arrival to unit and care assumed.

## 2018-06-18 NOTE — IP AVS SNAPSHOT
[address] 
[phone] Patient: Chip Gong MRN: [id number] KBT:[id number]/57/[id number] You are allergic to the following Allergen Reactions Statins-Hmg-Coa Reductase Inhibitors Myalgia Other (comments) Causes aching, generalized Recent Documentation Weight BMI Smoking Status 77.1 kg 30.11 kg/m2 Former Smoker Emergency Contacts  (Rel.) Home Phone Work Phone Mobile Phone Cl Ward [phone] -- [phone] Kendra Hare (Spouse) [phone] -- --  
 Freida Arellano (Son) [phone] -- [phone] About your hospitalization You were admitted on:  June 18, 2018 You last received care in the:  [facility] You were discharged on:  June 19, 2018 Why you were hospitalized Your primary diagnosis was:  Not on File Your diagnoses also included:  Scc (Squamous Cell Carcinoma), Scalp/Neck Providers Seen During Your Hospitalization Provider Specialty Primary office phone Jenn Penn MD Otolaryngology [phone] Your Primary Care Physician (PCP) Primary Care Physician Office Phone Office Fax [address] [phone] Follow-up Information Follow up With Details Comments Contact Info Benjy Hayes MD   [address] [place] [address] [phone] Jenn Penn MD Call call in 3 - 5 days [place] [address] 
[phone] My Medications ASK your physician about these medications Instructions Each Dose to Equal  
 Morning Noon Evening Bedtime  
 amLODIPine 2.5 mg tablet Commonly known as:  Lennis Eagles Your last dose was: Your next dose is: Take 2.5 mg by mouth daily. 2.5 mg  
    
   
   
   
  
 aspirin delayed-release 81 mg tablet Your last dose was: Your next dose is: Take 81 mg by mouth nightly. 81 mg  
    
   
   
   
  
 ezetimibe 10 mg tablet Commonly known as:  Marcello Phillip Your last dose was: Your next dose is: TAKE ONE TABLET BY MOUTH AT NIGHT  
     
   
   
   
  
 finasteride 5 mg tablet Commonly known as:  PROSCAR Your last dose was: Your next dose is: Take 5 mg by mouth daily. 5 mg  
    
   
   
   
  
 isosorbide mononitrate 20 mg tablet Commonly known as:  ISMO, MONOKET Your last dose was: Your next dose is: Take 1 Tab by mouth two (2) times a day. 20 mg  
    
   
   
   
  
 levothyroxine 25 mcg tablet Commonly known as:  SYNTHROID Your last dose was: Your next dose is: Take 25 mcg by mouth Daily (before breakfast). 25 mcg  
    
   
   
   
  
 lisinopril 10 mg tablet Commonly known as:  Nonah Domi Your last dose was: Your next dose is: Take 10 mg by mouth daily. 10 mg  
    
   
   
   
  
 LUMIGAN 0.03 % ophthalmic drops Generic drug:  bimatoprost  
   
Your last dose was: Your next dose is:    
   
   
 Administer 1 Drop to both eyes nightly. 1 Drop  
    
   
   
   
  
 nitroglycerin 0.4 mg SL tablet Commonly known as:  NITROSTAT Your last dose was: Your next dose is:    
   
   
 1 Tab by SubLINGual route every five (5) minutes as needed for Chest Pain (max 3 tabs daily for chest pain). Up to 3 doses. 0.4 mg  
    
   
   
   
  
 NORCO 5-325 mg per tablet Generic drug:  HYDROcodone-acetaminophen Your last dose was: Your next dose is: Take 1 Tab by mouth every six (6) hours as needed for Pain. 1 Tab RAPAFLO 8 mg capsule Generic drug:  silodosin Your last dose was: Your next dose is: Take 8 mg by mouth nightly. 8 mg SIMBRINZA 1-0.2 % Drps Generic drug:  brinzolamide-brimonidine Your last dose was: Your next dose is:    
   
   
 Apply 1 Drop to eye two (2) times a day. 1 Drop  
    
   
   
   
  
 timolol 0.5 % ophthalmic solution Commonly known as:  TIMOPTIC Your last dose was: Your next dose is:    
   
   
 Administer 1 Drop to both eyes two (2) times a day. 1 Drop TYLENOL EXTRA STRENGTH 500 mg tablet Generic drug:  acetaminophen Your last dose was: Your next dose is: Take  by mouth every six (6) hours as needed for Pain. VITAMIN D3 1,000 unit tablet Generic drug:  cholecalciferol Your last dose was: Your next dose is: Take 1,000 Units by mouth daily (with breakfast). 1000 Units Discharge Instructions Wound Care: After Your Visit Your Care Instructions Taking good care of your wound at home will help it heal quickly and reduce your chance of infection. The doctor has checked you carefully, but problems can develop later. If you notice any problems or new symptoms, get medical treatment right away. Follow-up care is a key part of your treatment and safety. Be sure to make and go to all appointments, and call your doctor if you are having problems. It's also a good idea to know your test results and keep a list of the medicines you take. How can you care for yourself at home? ·  
¨ You may cover the wound with a thin layer of antibiotic ointment, such as bacitracin, and a nonstick bandage. ¨ Apply more ointment and replace the bandage as needed. · Take pain medicines exactly as directed. Some pain is normal with a wound, but do not ignore pain that is getting worse instead of better. You could have an infection. ¨ If the doctor gave you a prescription medicine for pain, take it as prescribed. ¨ If you are not taking a prescription pain medicine, ask your doctor if you can take an over-the-counter medicine. · Your doctor may have closed your wound with stitches (sutures), staples, or skin glue. ¨ If you have stitches, your doctor may remove them after several days to 2 weeks. Or you may have stitches that dissolve on their own. ¨ If you have staples, your doctor may remove them after 7 to 10 days. ¨ If your wound was closed with skin glue, the glue will wear off in a few days to 2 weeks. When should you call for help? Call your doctor now or seek immediate medical care if: 
· You have signs of infection, such as: 
¨ Increased pain, swelling, warmth, or redness near the wound. ¨ Red streaks leading from the wound. ¨ Pus draining from the wound. ¨ A fever. · You bleed so much from your incision that you soak one or more bandages over 2 to 4 hours. Watch closely for changes in your health, and be sure to contact your doctor if: · The wound is not getting better each day. Where can you learn more? Go to [employer] Enter B902 in the search box to learn more about \"Wound Care: After Your Visit. \"  
© 4807-1322 Healthwise, Incorporated. Care instructions adapted under license by [employer] (which disclaims liability or warranty for this information). This care instruction is for use with your licensed healthcare professional. If you have questions about a medical condition or this instruction, always ask your healthcare professional. [place] [address] any warranty or liability for your use of this information. Content Version: 74.1.943857; Last Revised: April 23, 2012 Discharge Orders None Introducing hospitals & HEALTH SERVICES! [employer] introduces [employer] patient portal. Now you can access parts of your medical record, email your doctor's office, and request medication refills online.    
 
1. In your internet browser, go to [email or web address] 2. Click on the First Time User? Click Here link in the Sign In box. You will see the New Member Sign Up page. 3. Enter your [employer] Access Code exactly as it appears below. You will not need to use this code after youve completed the sign-up process. If you do not sign up before the expiration date, you must request a new code. · [employer] Access Code: [email or web address] Expires: 9/2/2018  8:45 AM 
 
4. Enter the last four digits of your Social Security Number (xxxx) and Date of Birth (mm/dd/yyyy) as indicated and click Submit. You will be taken to the next sign-up page. 5. Create a [employer] ID. This will be your [employer] login ID and cannot be changed, so think of one that is secure and easy to remember. 6. Create a [employer] password. You can change your password at any time. 7. Enter your Password Reset Question and Answer. This can be used at a later time if you forget your password. 8. Enter your e-mail address. You will receive e-mail notification when new information is available in [address] 9. Click Sign Up. You can now view and download portions of your medical record. 10. Click the Download Summary menu link to download a portable copy of your medical information. If you have questions, please visit the Frequently Asked Questions section of the [employer] website. Remember, [employer] is NOT to be used for urgent needs. For medical emergencies, dial 911. Now available from your iPhone and Android! General Information Please provide this summary of care documentation to your next provider. Patient Signature:  ____________________________________________________________ Date:  ____________________________________________________________  
  
Althia Kras Provider Signature:  ____________________________________________________________ Date:  ____________________________________________________________

## 2018-06-19 VITALS
RESPIRATION RATE: 18 BRPM | OXYGEN SATURATION: 96 % | DIASTOLIC BLOOD PRESSURE: 81 MMHG | SYSTOLIC BLOOD PRESSURE: 170 MMHG | BODY MASS INDEX: 30.11 KG/M2 | TEMPERATURE: 98 F | WEIGHT: 170 LBS | HEART RATE: 72 BPM

## 2018-06-19 PROCEDURE — 74011250636 HC RX REV CODE- 250/636: Performed by: ANESTHESIOLOGY

## 2018-06-19 PROCEDURE — 74011000250 HC RX REV CODE- 250: Performed by: OTOLARYNGOLOGY

## 2018-06-19 PROCEDURE — 74011250637 HC RX REV CODE- 250/637: Performed by: OTOLARYNGOLOGY

## 2018-06-19 RX ADMIN — DORZOLAMIDE HYDROCHLORIDE 1 DROP: 20 SOLUTION/ DROPS OPHTHALMIC at 08:39

## 2018-06-19 RX ADMIN — LISINOPRIL 10 MG: 10 TABLET ORAL at 08:37

## 2018-06-19 RX ADMIN — TIMOLOL MALEATE 1 DROP: 5 SOLUTION OPHTHALMIC at 08:39

## 2018-06-19 RX ADMIN — FINASTERIDE 5 MG: 5 TABLET, FILM COATED ORAL at 10:44

## 2018-06-19 RX ADMIN — LEVOTHYROXINE SODIUM 25 MCG: 50 TABLET ORAL at 07:36

## 2018-06-19 RX ADMIN — VITAMIN D, TAB 1000IU (100/BT) 1000 UNITS: 25 TAB at 07:36

## 2018-06-19 RX ADMIN — BRIMONIDINE TARTRATE 1 DROP: 2 SOLUTION OPHTHALMIC at 08:39

## 2018-06-19 RX ADMIN — ISOSORBIDE MONONITRATE 20 MG: 20 TABLET ORAL at 08:38

## 2018-06-19 RX ADMIN — AMLODIPINE BESYLATE 2.5 MG: 5 TABLET ORAL at 08:37

## 2018-06-19 NOTE — PROGRESS NOTES
Bedside shift change report given to Alex Hansen RN (oncoming nurse) by Delfino Cruz RN (offgoing nurse). Report included the following information SBAR and Kardex.

## 2018-06-19 NOTE — DISCHARGE INSTRUCTIONS
Wound Care: After Your Visit  Your Care Instructions  Taking good care of your wound at home will help it heal quickly and reduce your chance of infection. The doctor has checked you carefully, but problems can develop later. If you notice any problems or new symptoms, get medical treatment right away. Follow-up care is a key part of your treatment and safety. Be sure to make and go to all appointments, and call your doctor if you are having problems. It's also a good idea to know your test results and keep a list of the medicines you take. How can you care for yourself at home? ·   ¨ You may cover the wound with a thin layer of antibiotic ointment, such as bacitracin, and a nonstick bandage. ¨ Apply more ointment and replace the bandage as needed. · Take pain medicines exactly as directed. Some pain is normal with a wound, but do not ignore pain that is getting worse instead of better. You could have an infection. ¨ If the doctor gave you a prescription medicine for pain, take it as prescribed. ¨ If you are not taking a prescription pain medicine, ask your doctor if you can take an over-the-counter medicine. · Your doctor may have closed your wound with stitches (sutures), staples, or skin glue. ¨ If you have stitches, your doctor may remove them after several days to 2 weeks. Or you may have stitches that dissolve on their own. ¨ If you have staples, your doctor may remove them after 7 to 10 days. ¨ If your wound was closed with skin glue, the glue will wear off in a few days to 2 weeks. When should you call for help? Call your doctor now or seek immediate medical care if:  · You have signs of infection, such as:  ¨ Increased pain, swelling, warmth, or redness near the wound. ¨ Red streaks leading from the wound. ¨ Pus draining from the wound. ¨ A fever. · You bleed so much from your incision that you soak one or more bandages over 2 to 4 hours.   Watch closely for changes in your health, and be sure to contact your doctor if:  · The wound is not getting better each day. Where can you learn more? Go to [employer]be  Enter M973 in the search box to learn more about \"Wound Care: After Your Visit. \"   © 6796-7798 Healthwise, Incorporated. Care instructions adapted under license by [employer] (which disclaims liability or warranty for this information). This care instruction is for use with your licensed healthcare professional. If you have questions about a medical condition or this instruction, always ask your healthcare professional. [address] any warranty or liability for your use of this information. Content Version: 12.5.616626;  Last Revised: April 23, 2012

## 2018-06-19 NOTE — PROGRESS NOTES
Discharge instructions discussed with patient and daughter. Verbalized understanding. New prescribed medications discussed. Opportunity to ask questions given. IV removed. Extra sterile 4x4 dressing given. Signature obtained. Copies given.

## 2018-06-19 NOTE — PROGRESS NOTES
Neurosurgery Progress Note    Pt seen with Dr. Servando Murillo. Doing well. Vital signs stable. Pain controlled. From NSGY standpoint may be discharged home and follow-up with Dr. Fany Rosenthal. Will defer final discharge decision to Dr. Fany Rosenthal.     Sam Riley NP

## 2018-07-02 NOTE — OP NOTES
[address]  OPERATIVE REPORT    Heidi Leonardo  MR#: [id number]  : 1929  ACCOUNT #: [de-identified]   DATE OF SERVICE: 2018    PREOPERATIVE DIAGNOSIS:  Squamous cell carcinoma of the scalp. POSTOPERATIVE DIAGNOSIS:  Squamous cell carcinoma of the scalp. PROCEDURE PERFORMED:    1. Xenograft scalp reconstruction. 2.  Wound debridement in preparation for graft reconstruction. 3.  Calvarial resection and resection of carcinoma from dura. SURGEON:  Marah Armenta MD and Dr. Judy Agee. ANESTHESIA:  General endotracheal tube anesthesia. SPECIMENS REMOVED:  -.     COMPLICATIONS:  No complication. ESTIMATED BLOOD LOSS:  10 mL. IMPLANTS:  No implant. ASSISTANT:  No assistant. INDICATIONS AND FINDINGS:  The patient had an invasive squamous cell carcinoma of the right frontal scalp extending through the calvarium to the inner table of the calvarium including the dura macroscopically. Plans are made with neurosurgery for a joint approach to resect the full thickness of the calvarium with plans for xenograft reconstruction to allow healthy granulation of the wound bed prior to full thickness skin graft reconstruction. Surgical concerns related to the sagittal sinus and tumor proximity to this. Findings included tumor extension to the inner table of the calvarium with possible peppercorn bead-sized tissue on the dura itself, the last ablated. DETAILS OF PROCEDURES:  In the operating room, the patient was induced under general endotracheal tube anesthesia. Following that, she was prepped and draped in a sterile fashion. The wound was first debrided using a 15 blade and tenotomy scissors to remove granulation tissue in the wound bed and freshen the scalp edges, beveling these for recipient acceptance of the xenograft.     The calvarial resection was performed by Dr. Judy Agee using a cutting bur to resect the full thickness of calvarium, superficial table cancellous bone and inner table down to the dura where there was a peppercorn sized soft tissue. This was scraped clean with a Gabbie Janet, further ablating its junction with the dura itself to clear that tissue macroscopically. This left a wound defect of 2 cm plus in the calvarium. A xenograft was then shaped, configured and applied using Integra as the source and fixed to the surrounding calvarium with staples. A bolster dressing of Xeroform and antibiotic soaked cotton was then applied to this, fixed to the scalp with mattress of 3-0 Prolene. The patient was then handed over to anesthesia for awakening and transferred to the recovery room.       MD MAC Kothari / RAFI  D: 07/01/2018 22:37     T: 07/01/2018 22:58  JOB #: [id number]  CC: Vern Guzman MD

## 2018-07-28 ENCOUNTER — ANESTHESIA EVENT (OUTPATIENT)
Dept: SURGERY | Age: 83
End: 2018-07-28
Payer: MEDICARE

## 2018-07-28 RX ORDER — OXYCODONE HYDROCHLORIDE 5 MG/1
5 TABLET ORAL AS NEEDED
Status: CANCELLED | OUTPATIENT
Start: 2018-07-28

## 2018-07-28 RX ORDER — MIDAZOLAM HYDROCHLORIDE 1 MG/ML
1 INJECTION, SOLUTION INTRAMUSCULAR; INTRAVENOUS
Status: CANCELLED | OUTPATIENT
Start: 2018-07-28

## 2018-07-28 RX ORDER — ONDANSETRON 2 MG/ML
4 INJECTION INTRAMUSCULAR; INTRAVENOUS AS NEEDED
Status: CANCELLED | OUTPATIENT
Start: 2018-07-28

## 2018-07-28 RX ORDER — DIPHENHYDRAMINE HYDROCHLORIDE 50 MG/ML
12.5 INJECTION, SOLUTION INTRAMUSCULAR; INTRAVENOUS AS NEEDED
Status: CANCELLED | OUTPATIENT
Start: 2018-07-28 | End: 2018-07-28

## 2018-07-28 RX ORDER — FENTANYL CITRATE 50 UG/ML
25 INJECTION, SOLUTION INTRAMUSCULAR; INTRAVENOUS
Status: CANCELLED | OUTPATIENT
Start: 2018-07-28

## 2018-07-28 RX ORDER — SODIUM CHLORIDE 0.9 % (FLUSH) 0.9 %
5-10 SYRINGE (ML) INJECTION AS NEEDED
Status: CANCELLED | OUTPATIENT
Start: 2018-07-28

## 2018-07-28 RX ORDER — MORPHINE SULFATE 10 MG/ML
2 INJECTION, SOLUTION INTRAMUSCULAR; INTRAVENOUS
Status: CANCELLED | OUTPATIENT
Start: 2018-07-28

## 2018-07-28 RX ORDER — SODIUM CHLORIDE, SODIUM LACTATE, POTASSIUM CHLORIDE, CALCIUM CHLORIDE 600; 310; 30; 20 MG/100ML; MG/100ML; MG/100ML; MG/100ML
125 INJECTION, SOLUTION INTRAVENOUS CONTINUOUS
Status: CANCELLED | OUTPATIENT
Start: 2018-07-28

## 2018-07-30 ENCOUNTER — ANESTHESIA (OUTPATIENT)
Dept: SURGERY | Age: 83
End: 2018-07-30
Payer: MEDICARE

## 2018-07-30 ENCOUNTER — HOSPITAL ENCOUNTER (OUTPATIENT)
Age: 83
Setting detail: OUTPATIENT SURGERY
Discharge: HOME | End: 2018-07-30
Attending: OTOLARYNGOLOGY | Admitting: OTOLARYNGOLOGY
Payer: MEDICARE

## 2018-07-30 VITALS
HEART RATE: 70 BPM | HEIGHT: 63 IN | RESPIRATION RATE: 12 BRPM | WEIGHT: 170 LBS | DIASTOLIC BLOOD PRESSURE: 74 MMHG | BODY MASS INDEX: 30.12 KG/M2 | TEMPERATURE: 98.5 F | OXYGEN SATURATION: 96 % | SYSTOLIC BLOOD PRESSURE: 164 MMHG

## 2018-07-30 DIAGNOSIS — C44.42 SQUAMOUS CELL CANCER OF SCALP AND SKIN OF NECK: Primary | ICD-10-CM

## 2018-07-30 PROCEDURE — 77030013079 HC BLNKT BAIR HGGR 3M -A: Performed by: ANESTHESIOLOGY

## 2018-07-30 PROCEDURE — 74011000250 HC RX REV CODE- 250: Performed by: OTOLARYNGOLOGY

## 2018-07-30 PROCEDURE — 77030002986 HC SUT PROL J&J -A: Performed by: OTOLARYNGOLOGY

## 2018-07-30 PROCEDURE — 74011250636 HC RX REV CODE- 250/636: Performed by: ANESTHESIOLOGY

## 2018-07-30 PROCEDURE — 74011000272 HC RX REV CODE- 272: Performed by: OTOLARYNGOLOGY

## 2018-07-30 PROCEDURE — 77030018836 HC SOL IRR NACL ICUM -A: Performed by: OTOLARYNGOLOGY

## 2018-07-30 PROCEDURE — 74011250636 HC RX REV CODE- 250/636

## 2018-07-30 PROCEDURE — 77030006627 HC BLD GRFT DRMTO ZIMM -B: Performed by: OTOLARYNGOLOGY

## 2018-07-30 PROCEDURE — 77030032490 HC SLV COMPR SCD KNE COVD -B: Performed by: OTOLARYNGOLOGY

## 2018-07-30 PROCEDURE — 74011250637 HC RX REV CODE- 250/637: Performed by: OTOLARYNGOLOGY

## 2018-07-30 PROCEDURE — 74011250636 HC RX REV CODE- 250/636: Performed by: OTOLARYNGOLOGY

## 2018-07-30 PROCEDURE — 76210000020 HC REC RM PH II FIRST 0.5 HR: Performed by: OTOLARYNGOLOGY

## 2018-07-30 PROCEDURE — 74011000250 HC RX REV CODE- 250

## 2018-07-30 PROCEDURE — 76010000153 HC OR TIME 1.5 TO 2 HR: Performed by: OTOLARYNGOLOGY

## 2018-07-30 PROCEDURE — 76210000006 HC OR PH I REC 0.5 TO 1 HR: Performed by: OTOLARYNGOLOGY

## 2018-07-30 PROCEDURE — 77030011267 HC ELECTRD BLD COVD -A: Performed by: OTOLARYNGOLOGY

## 2018-07-30 PROCEDURE — 77030020754 HC CUF TRNQT 2BLA STRY -B: Performed by: OTOLARYNGOLOGY

## 2018-07-30 PROCEDURE — 77030002974 HC SUT PLN J&J -A: Performed by: OTOLARYNGOLOGY

## 2018-07-30 PROCEDURE — 76060000034 HC ANESTHESIA 1.5 TO 2 HR: Performed by: OTOLARYNGOLOGY

## 2018-07-30 RX ORDER — DEXTROSE, SODIUM CHLORIDE, SODIUM LACTATE, POTASSIUM CHLORIDE, AND CALCIUM CHLORIDE 5; .6; .31; .03; .02 G/100ML; G/100ML; G/100ML; G/100ML; G/100ML
125 INJECTION, SOLUTION INTRAVENOUS CONTINUOUS
Status: DISCONTINUED | OUTPATIENT
Start: 2018-07-30 | End: 2018-07-30 | Stop reason: HOSPADM

## 2018-07-30 RX ORDER — DEXTROSE, SODIUM CHLORIDE, SODIUM LACTATE, POTASSIUM CHLORIDE, AND CALCIUM CHLORIDE 5; .6; .31; .03; .02 G/100ML; G/100ML; G/100ML; G/100ML; G/100ML
100 INJECTION, SOLUTION INTRAVENOUS CONTINUOUS
Status: DISCONTINUED | OUTPATIENT
Start: 2018-07-30 | End: 2018-07-30 | Stop reason: HOSPADM

## 2018-07-30 RX ORDER — PHENYLEPHRINE HCL IN 0.9% NACL 0.4MG/10ML
SYRINGE (ML) INTRAVENOUS AS NEEDED
Status: DISCONTINUED | OUTPATIENT
Start: 2018-07-30 | End: 2018-07-30 | Stop reason: HOSPADM

## 2018-07-30 RX ORDER — HYDROCODONE BITARTRATE AND ACETAMINOPHEN 5; 325 MG/1; MG/1
1 TABLET ORAL
Status: CANCELLED | OUTPATIENT
Start: 2018-07-30

## 2018-07-30 RX ORDER — SODIUM CHLORIDE, SODIUM LACTATE, POTASSIUM CHLORIDE, CALCIUM CHLORIDE 600; 310; 30; 20 MG/100ML; MG/100ML; MG/100ML; MG/100ML
125 INJECTION, SOLUTION INTRAVENOUS CONTINUOUS
Status: DISCONTINUED | OUTPATIENT
Start: 2018-07-30 | End: 2018-07-30 | Stop reason: HOSPADM

## 2018-07-30 RX ORDER — DEXAMETHASONE SODIUM PHOSPHATE 4 MG/ML
INJECTION, SOLUTION INTRA-ARTICULAR; INTRALESIONAL; INTRAMUSCULAR; INTRAVENOUS; SOFT TISSUE AS NEEDED
Status: DISCONTINUED | OUTPATIENT
Start: 2018-07-30 | End: 2018-07-30 | Stop reason: HOSPADM

## 2018-07-30 RX ORDER — SODIUM CHLORIDE 0.9 % (FLUSH) 0.9 %
5-10 SYRINGE (ML) INJECTION AS NEEDED
Status: CANCELLED | OUTPATIENT
Start: 2018-07-30

## 2018-07-30 RX ORDER — PROPOFOL 10 MG/ML
INJECTION, EMULSION INTRAVENOUS
Status: DISCONTINUED | OUTPATIENT
Start: 2018-07-30 | End: 2018-07-30 | Stop reason: HOSPADM

## 2018-07-30 RX ORDER — FENTANYL CITRATE 50 UG/ML
INJECTION, SOLUTION INTRAMUSCULAR; INTRAVENOUS AS NEEDED
Status: DISCONTINUED | OUTPATIENT
Start: 2018-07-30 | End: 2018-07-30 | Stop reason: HOSPADM

## 2018-07-30 RX ORDER — FENTANYL CITRATE 50 UG/ML
50 INJECTION, SOLUTION INTRAMUSCULAR; INTRAVENOUS AS NEEDED
Status: DISCONTINUED | OUTPATIENT
Start: 2018-07-30 | End: 2018-07-30 | Stop reason: HOSPADM

## 2018-07-30 RX ORDER — MIDAZOLAM HYDROCHLORIDE 1 MG/ML
1 INJECTION, SOLUTION INTRAMUSCULAR; INTRAVENOUS AS NEEDED
Status: DISCONTINUED | OUTPATIENT
Start: 2018-07-30 | End: 2018-07-30 | Stop reason: HOSPADM

## 2018-07-30 RX ORDER — SODIUM CHLORIDE 0.9 % (FLUSH) 0.9 %
5-10 SYRINGE (ML) INJECTION AS NEEDED
Status: DISCONTINUED | OUTPATIENT
Start: 2018-07-30 | End: 2018-07-30 | Stop reason: HOSPADM

## 2018-07-30 RX ORDER — SODIUM CHLORIDE 0.9 % (FLUSH) 0.9 %
5-10 SYRINGE (ML) INJECTION EVERY 8 HOURS
Status: DISCONTINUED | OUTPATIENT
Start: 2018-07-30 | End: 2018-07-30 | Stop reason: HOSPADM

## 2018-07-30 RX ORDER — CEFAZOLIN SODIUM/WATER 2 G/20 ML
2 SYRINGE (ML) INTRAVENOUS ONCE
Status: COMPLETED | OUTPATIENT
Start: 2018-07-30 | End: 2018-07-30

## 2018-07-30 RX ORDER — BACITRACIN 500 [USP'U]/G
OINTMENT TOPICAL AS NEEDED
Status: DISCONTINUED | OUTPATIENT
Start: 2018-07-30 | End: 2018-07-30 | Stop reason: HOSPADM

## 2018-07-30 RX ORDER — PROPOFOL 10 MG/ML
INJECTION, EMULSION INTRAVENOUS AS NEEDED
Status: DISCONTINUED | OUTPATIENT
Start: 2018-07-30 | End: 2018-07-30 | Stop reason: HOSPADM

## 2018-07-30 RX ORDER — HYDROCODONE BITARTRATE AND ACETAMINOPHEN 7.5; 325 MG/1; MG/1
2 TABLET ORAL
Qty: 30 TAB | Refills: 0 | Status: SHIPPED | OUTPATIENT
Start: 2018-07-30 | End: 2021-05-06

## 2018-07-30 RX ORDER — CEPHALEXIN 500 MG/1
500 CAPSULE ORAL 3 TIMES DAILY
Qty: 30 CAP | Refills: 0 | Status: SHIPPED | OUTPATIENT
Start: 2018-07-30 | End: 2018-08-09

## 2018-07-30 RX ORDER — LIDOCAINE HYDROCHLORIDE 10 MG/ML
0.5 INJECTION, SOLUTION EPIDURAL; INFILTRATION; INTRACAUDAL; PERINEURAL AS NEEDED
Status: DISCONTINUED | OUTPATIENT
Start: 2018-07-30 | End: 2018-07-30 | Stop reason: HOSPADM

## 2018-07-30 RX ORDER — SODIUM CHLORIDE 0.9 % (FLUSH) 0.9 %
5-10 SYRINGE (ML) INJECTION EVERY 8 HOURS
Status: CANCELLED | OUTPATIENT
Start: 2018-07-30

## 2018-07-30 RX ORDER — DEXMEDETOMIDINE HYDROCHLORIDE 4 UG/ML
INJECTION, SOLUTION INTRAVENOUS AS NEEDED
Status: DISCONTINUED | OUTPATIENT
Start: 2018-07-30 | End: 2018-07-30 | Stop reason: HOSPADM

## 2018-07-30 RX ORDER — MORPHINE SULFATE 2 MG/ML
2 INJECTION, SOLUTION INTRAMUSCULAR; INTRAVENOUS
Status: CANCELLED | OUTPATIENT
Start: 2018-07-30

## 2018-07-30 RX ORDER — MINERAL OIL
OIL (ML) MISCELLANEOUS AS NEEDED
Status: DISCONTINUED | OUTPATIENT
Start: 2018-07-30 | End: 2018-07-30 | Stop reason: HOSPADM

## 2018-07-30 RX ORDER — LIDOCAINE HYDROCHLORIDE AND EPINEPHRINE 10; 10 MG/ML; UG/ML
INJECTION, SOLUTION INFILTRATION; PERINEURAL AS NEEDED
Status: DISCONTINUED | OUTPATIENT
Start: 2018-07-30 | End: 2018-07-30 | Stop reason: HOSPADM

## 2018-07-30 RX ORDER — ONDANSETRON 2 MG/ML
INJECTION INTRAMUSCULAR; INTRAVENOUS AS NEEDED
Status: DISCONTINUED | OUTPATIENT
Start: 2018-07-30 | End: 2018-07-30 | Stop reason: HOSPADM

## 2018-07-30 RX ADMIN — Medication 80 MCG: at 16:12

## 2018-07-30 RX ADMIN — PROPOFOL 30 MG: 10 INJECTION, EMULSION INTRAVENOUS at 15:20

## 2018-07-30 RX ADMIN — Medication 80 MCG: at 15:45

## 2018-07-30 RX ADMIN — DEXAMETHASONE SODIUM PHOSPHATE 4 MG: 4 INJECTION, SOLUTION INTRA-ARTICULAR; INTRALESIONAL; INTRAMUSCULAR; INTRAVENOUS; SOFT TISSUE at 15:29

## 2018-07-30 RX ADMIN — Medication 2 G: at 15:35

## 2018-07-30 RX ADMIN — FENTANYL CITRATE 25 MCG: 50 INJECTION, SOLUTION INTRAMUSCULAR; INTRAVENOUS at 15:43

## 2018-07-30 RX ADMIN — PROPOFOL 50 MCG/KG/MIN: 10 INJECTION, EMULSION INTRAVENOUS at 15:20

## 2018-07-30 RX ADMIN — Medication 80 MCG: at 16:16

## 2018-07-30 RX ADMIN — ONDANSETRON 4 MG: 2 INJECTION INTRAMUSCULAR; INTRAVENOUS at 15:29

## 2018-07-30 RX ADMIN — FENTANYL CITRATE 25 MCG: 50 INJECTION, SOLUTION INTRAMUSCULAR; INTRAVENOUS at 15:37

## 2018-07-30 RX ADMIN — DEXMEDETOMIDINE HYDROCHLORIDE 4 MCG: 4 INJECTION, SOLUTION INTRAVENOUS at 15:39

## 2018-07-30 RX ADMIN — DEXMEDETOMIDINE HYDROCHLORIDE 4 MCG: 4 INJECTION, SOLUTION INTRAVENOUS at 15:37

## 2018-07-30 RX ADMIN — FENTANYL CITRATE 25 MCG: 50 INJECTION, SOLUTION INTRAMUSCULAR; INTRAVENOUS at 15:22

## 2018-07-30 RX ADMIN — DEXMEDETOMIDINE HYDROCHLORIDE 4 MCG: 4 INJECTION, SOLUTION INTRAVENOUS at 15:35

## 2018-07-30 RX ADMIN — Medication 80 MCG: at 15:48

## 2018-07-30 RX ADMIN — SODIUM CHLORIDE, POTASSIUM CHLORIDE, SODIUM LACTATE AND CALCIUM CHLORIDE: 600; 310; 30; 20 INJECTION, SOLUTION INTRAVENOUS at 15:00

## 2018-07-30 RX ADMIN — FENTANYL CITRATE 25 MCG: 50 INJECTION, SOLUTION INTRAMUSCULAR; INTRAVENOUS at 15:32

## 2018-07-30 NOTE — ROUTINE PROCESS
Patient: Cele Marin MRN: [id number]  SSN: [id number]   YOB: 1929  Age: 80 y.o. Sex: male     Patient is status post Procedure(s):  CREATION OF RECIPIENT SITE BY EXCISION OF OPEN WOUND OF FACE/ APPLICATION OF SPLIT THICKNESS SKIN GRAFT TO SCALP  SPLIT THICKNESS SKIN GRAFT RIGHTN THIGH. Surgeon(s) and Role:     * Courtney Mays MD - Primary    Local/Dose/Irrigation: SEE [facility]                   Peripheral IV 07/30/18 Right Wrist (Active)                           Dressing/Packing:  Wound Leg Right-DRESSING TYPE: Xeroform;Transparent film;Gauze wrap (scott) (07/30/18 1500)  Wound Scalp-DRESSING TYPE: Cotton ball(s); Transparent film;Xeroform (07/30/18 1500)  Splint/Cast:  ]    Other:

## 2018-07-30 NOTE — DISCHARGE INSTRUCTIONS
Wound Care: After Your Visit  Your Care Instructions  Taking good care of your wound at home will help it heal quickly and reduce your chance of infection. The doctor has checked you carefully, but problems can develop later. If you notice any problems or new symptoms, get medical treatment right away. Follow-up care is a key part of your treatment and safety. Be sure to make and go to all appointments, and call your doctor if you are having problems. It's also a good idea to know your test results and keep a list of the medicines you take. How can you care for yourself at home? · Leave sewn-in bolster dressing to be removed at post-op visit after 10 days  ¨ You may reinforce the wound with a thin layer of antibiotic ointment, such as bacitracin, and a nonstick bandage. ¨ Apply more ointment and replace the bandage as needed. ¨ May trim the leg bandage, thin plastic coating as it frays. · Take pain medicines exactly as directed. Some pain is normal with a wound, but do not ignore pain that is getting worse instead of better. You could have an infection. ¨ If the doctor gave you a prescription medicine for pain, take it as prescribed. ¨ If you are not taking a prescription pain medicine, ask your doctor if you can take an over-the-counter medicine. · Your doctor may have closed your wound with stitches (sutures), staples, or skin glue. ¨ If you have stitches, your doctor may remove them after several days to 2 weeks. Or you may have stitches that dissolve on their own. ¨ If you have staples, your doctor may remove them after 7 to 10 days. ¨ If your wound was closed with skin glue, the glue will wear off in a few days to 2 weeks. When should you call for help? Call your doctor now or seek immediate medical care if:  · You have signs of infection, such as:  ¨ Increased pain, swelling, warmth, or redness near the wound. ¨ Red streaks leading from the wound. ¨ Pus draining from the wound.   ¨ A fever.  · You bleed so much from your incision that you soak one or more bandages over 2 to 4 hours. Watch closely for changes in your health, and be sure to contact your doctor if:  · The wound is not getting better each day. Where can you learn more? Go to [email or web address].be  Enter M973 in the search box to learn more about \"Wound Care: After Your Visit. \"   © 9507-3617 Healthwise, Incorporated. Care instructions adapted under license by [employer] (which disclaims liability or warranty for this information). This care instruction is for use with your licensed healthcare professional. If you have questions about a medical condition or this instruction, always ask your healthcare professional. [place] [address] any warranty or liability for your use of this information. Content Version: 91.1.922083;  Last Revised: April 23, 2012

## 2018-07-30 NOTE — IP AVS SNAPSHOT
[address] [facility] 
[phone] Patient: Luis Miguel Shaikh MRN: [id number] FEX:[id number] About your hospitalization You were admitted on:  July 30, 2018 You last received care in the:  [facility] PACU You were discharged on:  July 30, 2018 Why you were hospitalized Your primary diagnosis was:  Not on File Follow-up Information Follow up With Details Comments Contact Info Camila Buerger, MD   [address] [place] [address] 
[phone] Yuri Mon MD Schedule an appointment as soon as possible for a visit in 10 day(s)  [address] [place] [address] 
[phone] Your Scheduled Appointments Friday September 21, 2018  9:45 AM EDT  
ESTABLISHED PATIENT with Luna Churchill MD  
[facility] [address] [place] [address]) [address] [place] [address]  
[phone] Discharge Orders None A check aline indicates which time of day the medication should be taken. My Medications START taking these medications Instructions Each Dose to Equal  
 Morning Noon Evening Bedtime  
 cephALEXin 500 mg capsule Commonly known as:  Reyes Moran Your last dose was: Your next dose is: Take 1 Cap by mouth three (3) times daily for 10 days. 500 mg HYDROcodone-acetaminophen 7.5-325 mg per tablet Commonly known as:  Siddhartha Starr Your last dose was: Your next dose is: Take 2 Tabs by mouth every six (6) hours as needed for Pain for up to 30 doses. Max Daily Amount: 8 Tabs. Indications: Pain 2 Tab CHANGE how you take these medications Instructions Each Dose to Equal  
 Morning Noon Evening Bedtime  
 isosorbide mononitrate 20 mg tablet Commonly known as:  ISMO MONOKET What changed:  additional instructions Your last dose was: Your next dose is: Take 1 Tab by mouth two (2) times a day. 20 mg CONTINUE taking these medications Instructions Each Dose to Equal  
 Morning Noon Evening Bedtime  
 amLODIPine 2.5 mg tablet Commonly known as:  Priscila Fraction Your last dose was: Your next dose is: Take 2.5 mg by mouth daily. 2.5 mg  
    
   
   
   
  
 aspirin delayed-release 81 mg tablet Your last dose was: Your next dose is: Take 81 mg by mouth nightly. 81 mg  
    
   
   
   
  
 ezetimibe 10 mg tablet Commonly known as:  Gerardine Maryland Your last dose was: Your next dose is: TAKE ONE TABLET BY MOUTH AT NIGHT  
     
   
   
   
  
 finasteride 5 mg tablet Commonly known as:  PROSCAR Your last dose was: Your next dose is: Take 5 mg by mouth daily. 5 mg  
    
   
   
   
  
 levothyroxine 25 mcg tablet Commonly known as:  SYNTHROID Your last dose was: Your next dose is: Take 25 mcg by mouth Daily (before breakfast). 25 mcg  
    
   
   
   
  
 lisinopril 10 mg tablet Commonly known as:  Julian Ponce Your last dose was: Your next dose is: Take 10 mg by mouth daily. 10 mg  
    
   
   
   
  
 LUMIGAN 0.03 % ophthalmic drops Generic drug:  bimatoprost  
   
Your last dose was: Your next dose is:    
   
   
 Administer 1 Drop to both eyes nightly. 1 Drop  
    
   
   
   
  
 nitroglycerin 0.4 mg SL tablet Commonly known as:  NITROSTAT Your last dose was: Your next dose is:    
   
   
 1 Tab by SubLINGual route every five (5) minutes as needed for Chest Pain (max 3 tabs daily for chest pain). Up to 3 doses. 0.4 mg  
    
   
   
   
  
 RAPAFLO 8 mg capsule Generic drug:  silodosin Your last dose was: Your next dose is: Take 8 mg by mouth nightly. 8 mg SIMBRINZA 1-0.2 % Drps Generic drug:  brinzolamide-brimonidine Your last dose was: Your next dose is:    
   
   
 Apply 1 Drop to eye two (2) times a day. 1 Drop  
    
   
   
   
  
 timolol 0.5 % ophthalmic solution Commonly known as:  TIMOPTIC Your last dose was: Your next dose is:    
   
   
 Administer 1 Drop to both eyes two (2) times a day. 1 Drop TYLENOL EXTRA STRENGTH 500 mg tablet Generic drug:  acetaminophen Your last dose was: Your next dose is: Take  by mouth every six (6) hours as needed for Pain. VITAMIN D3 1,000 unit tablet Generic drug:  cholecalciferol Your last dose was: Your next dose is: Take 1,000 Units by mouth daily (with breakfast). 1000 Units Where to Get Your Medications Information on where to get these meds will be given to you by the nurse or doctor. ! Ask your nurse or doctor about these medications  
  cephALEXin 500 mg capsule HYDROcodone-acetaminophen 7.5-325 mg per tablet Opioid Education Prescription Opioids: What You Need to Know: 
 
Prescription opioids can be used to help relieve moderate-to-severe pain and are often prescribed following a surgery or injury, or for certain health conditions. These medications can be an important part of treatment but also come with serious risks. Opioids are strong pain medicines. Examples include hydrocodone, oxycodone, fentanyl, and morphine. Heroin is an example of an illegal opioid. It is important to work with your health care provider to make sure you are getting the safest, most effective care. WHAT ARE THE RISKS AND SIDE EFFECTS OF OPIOID USE?  
Prescription opioids carry serious risks of addiction and overdose, especially with prolonged use. An opioid overdose, often marked by slow breathing, can cause sudden death. The use of prescription opioids can have a number of side effects as well, even when taken as directed. · Tolerance-meaning you might need to take more of a medication for the same pain relief · Physical dependence-meaning you have symptoms of withdrawal when the medication is stopped. Withdrawal symptoms can include nausea, sweating, chills, diarrhea, stomach cramps, and muscle aches. Withdrawal can last up to several weeks, depending on which drug you took and how long you took it. · Increased sensitivity to pain · Constipation · Nausea, vomiting, and dry mouth · Sleepiness and dizziness · Confusion · Depression · Low levels of testosterone that can result in lower sex drive, energy, and strength · Itching and sweating RISKS ARE GREATER WITH:      
· History of drug misuse, substance use disorder, or overdose · Mental health conditions (such as depression or anxiety) · Sleep apnea · Older age (72 years or older) · Pregnancy Avoid alcohol while taking prescription opioids. Also, unless specifically advised by your health care provider, medications to avoid include: · Benzodiazepines (such as Xanax or Valium) · Muscle relaxants (such as Soma or Flexeril) · Hypnotics (such as Ambien or Lunesta) · Other prescription opioids KNOW YOUR OPTIONS Talk to your health care provider about ways to manage your pain that don't involve prescription opioids. Some of these options may actually work better and have fewer risks and side effects. Options may include: 
· Pain relievers such as acetaminophen, ibuprofen, and naproxen · Some medications that are also used for depression or seizures · Physical therapy and exercise · Counseling to help patients learn how to cope better with triggers of pain and stress. · Application of heat or cold compress · Massage therapy · Relaxation techniques Be Informed Make sure you know the name of your medication, how much and how often to take it, and its potential risks & side effects. IF YOU ARE PRESCRIBED OPIOIDS FOR PAIN: 
· Never take opioids in greater amounts or more often than prescribed. Remember the goal is not to be pain-free but to manage your pain at a tolerable level. · Follow up with your primary care provider to: · Work together to create a plan on how to manage your pain. · Talk about ways to help manage your pain that don't involve prescription opioids. · Talk about any and all concerns and side effects. · Help prevent misuse and abuse. · Never sell or share prescription opioids · Help prevent misuse and abuse. · Store prescription opioids in a secure place and out of reach of others (this may include visitors, children, friends, and family). · Safely dispose of unused/unwanted prescription opioids: Find your community drug take-back program or your pharmacy mail-back program, or flush them down the toilet, following guidance from the Food and Drug Administration (www.fda.gov/Drugs/ResourcesForYou). · Visit www.cdc.gov/drugoverdose to learn about the risks of opioid abuse and overdose. · If you believe you may be struggling with addiction, tell your health care provider and ask for guidance or call [facility] [employer] at 8-046-332-YQMW. Discharge Instructions Wound Care: After Your Visit Your Care Instructions Taking good care of your wound at home will help it heal quickly and reduce your chance of infection. The doctor has checked you carefully, but problems can develop later. If you notice any problems or new symptoms, get medical treatment right away. Follow-up care is a key part of your treatment and safety.  Be sure to make and go to all appointments, and call your doctor if you are having problems. It's also a good idea to know your test results and keep a list of the medicines you take. How can you care for yourself at home? · Leave sewn-in bolster dressing to be removed at post-op visit after 10 days ¨ You may reinforce the wound with a thin layer of antibiotic ointment, such as bacitracin, and a nonstick bandage. ¨ Apply more ointment and replace the bandage as needed. ¨ May trim the leg bandage, thin plastic coating as it frays. · Take pain medicines exactly as directed. Some pain is normal with a wound, but do not ignore pain that is getting worse instead of better. You could have an infection. ¨ If the doctor gave you a prescription medicine for pain, take it as prescribed. ¨ If you are not taking a prescription pain medicine, ask your doctor if you can take an over-the-counter medicine. · Your doctor may have closed your wound with stitches (sutures), staples, or skin glue. ¨ If you have stitches, your doctor may remove them after several days to 2 weeks. Or you may have stitches that dissolve on their own. ¨ If you have staples, your doctor may remove them after 7 to 10 days. ¨ If your wound was closed with skin glue, the glue will wear off in a few days to 2 weeks. When should you call for help? Call your doctor now or seek immediate medical care if: 
· You have signs of infection, such as: 
¨ Increased pain, swelling, warmth, or redness near the wound. ¨ Red streaks leading from the wound. ¨ Pus draining from the wound. ¨ A fever. · You bleed so much from your incision that you soak one or more bandages over 2 to 4 hours. Watch closely for changes in your health, and be sure to contact your doctor if: · The wound is not getting better each day. Where can you learn more? Go to [email or web address].be Enter B652 in the search box to learn more about \"Wound Care: After Your Visit. \"  
© 2082-6484 Healthwise, Incorporated.  Care instructions adapted under license by Wilber Mast (which disclaims liability or warranty for this information). This care instruction is for use with your licensed healthcare professional. If you have questions about a medical condition or this instruction, always ask your healthcare professional. [address] any warranty or liability for your use of this information. Content Version: 64.8.535176; Last Revised: April 23, 2012 Introducing [facility]! Wilber Mast introduces [employer] patient portal. Now you can access parts of your medical record, email your doctor's office, and request medication refills online. 1. In your internet browser, go to [email or web address] 2. Click on the First Time User? Click Here link in the Sign In box. You will see the New Member Sign Up page. 3. Enter your [employer] Access Code exactly as it appears below. You will not need to use this code after youve completed the sign-up process. If you do not sign up before the expiration date, you must request a new code. · [employer] Access Code: [email or web address] Expires: 9/2/2018  8:45 AM 
 
4. Enter the last four digits of your Social Security Number (xxxx) and Date of Birth (mm/dd/yyyy) as indicated and click Submit. You will be taken to the next sign-up page. 5. Create a [employer] ID. This will be your [employer] login ID and cannot be changed, so think of one that is secure and easy to remember. 6. Create a [employer] password. You can change your password at any time. 7. Enter your Password Reset Question and Answer. This can be used at a later time if you forget your password. 8. Enter your e-mail address. You will receive e-mail notification when new information is available in [address] 9. Click Sign Up. You can now view and download portions of your medical record. 10. Click the Download Summary menu link to download a portable copy of your medical information. If you have questions, please visit the Frequently Asked Questions section of the MyChart website. Remember, [employer] is NOT to be used for urgent needs. For medical emergencies, dial 911. Now available from your iPhone and Android! Introducing Tyler Daugherty As a [facility] patient, I wanted to make you aware of our electronic visit tool called Tyler Daugherty. [facility] 24/7 allows you to connect within minutes with a medical provider 24 hours a day, seven days a week via a mobile device or tablet or logging into a secure website from your computer. You can access Tyler Daugherty from anywhere in the [place]. A virtual visit might be right for you when you have a simple condition and feel like you just dont want to get out of bed, or cant get away from work for an appointment, when your regular [facility] provider is not available (evenings, weekends or holidays), or when youre out of town and need minor care. Electronic visits cost only $49 and if the Lila [facility]ana 24/7 provider determines a prescription is needed to treat your condition, one can be electronically transmitted to a nearby pharmacy*. Please take a moment to enroll today if you have not already done so. The enrollment process is free and takes just a few minutes. To enroll, please download the Lila Magana 24/Wello ayla to your tablet or phone, or visit [email or web address] to enroll on your computer. And, as an [address] [place] [address] patient with a [employer] account, the results of your visits will be scanned into your electronic medical record and your primary care provider will be able to view the scanned results. We urge you to continue to see your regular [facility]ana provider for your ongoing medical care.   And while your primary care provider may not be the one available when you seek a Tyler Daugherty virtual visit, the peace of mind you get from getting a real diagnosis real time can be priceless. For more information on Tyler Daugherty, view our Frequently Asked Questions (FAQs) at [email or web address]. Sincerely, 
 
Alejandro Knight MD 
Chief Medical Officer [address] *:  certain medications cannot be prescribed via Tyler Daugherty Providers Seen During Your Hospitalization Provider Specialty Primary office phone Beka Poon MD Otolaryngology [phone] Your Primary Care Physician (PCP) Primary Care Physician Office Phone Office Fax [address] [phone] You are allergic to the following Allergen Reactions Statins-Hmg-Coa Reductase Inhibitors Myalgia Other (comments) Causes aching, generalized Recent Documentation Height Weight BMI Smoking Status 1.6 m 77.1 kg 30.11 kg/m2 Former Smoker Emergency Contacts Name Discharge Info Relation Home Work Mobile [address] CAREGIVER [3] Daughter [21] [phone] Adonis Rowe  Spouse [3] [phone] David Ling DISCHARGE CAREGIVER [3] Son [22] [phone] Patient Belongings The following personal items are in your possession at time of discharge: 
  Dental Appliances: None  Visual Aid: Glasses   Hearing Aids/Status: Does not own         Clothing: Other (comment) (clothing bag to pacu) Discharge Instructions Attachments/References MEFS - CEPHALEXIN (BIO-CEF, KEFLEX) - (BY MOUTH) (ENGLISH) MEFS - HYDROCODONE/ACETAMINOPHEN (VICODIN, Beronica Anglin, LORTAB) - (BY MOUTH) (ENGLISH) Patient Handouts Cephalexin (Bio-Cef, Keflex) - (By mouth) Why this medicine is used:  
Treats infections. Contact a nurse or doctor right away if you have: · Blistering, peeling, or red skin rash · Severe or bloody diarrhea Common side effects: · Mild diarrhea or nausea © 2017 [facility] Information is for End User's use only and may not be sold, redistributed or otherwise used for commercial purposes. Hydrocodone/Acetaminophen (Vicodin, Norco, Lortab) - (By mouth) Why this medicine is used:  
Treats pain. Contact a nurse or doctor right away if you have: · Blistering, peeling, red skin rash · Fast or slow heartbeat, shallow breathing, blue lips, fingernails, or skin · Anxiety, restlessness, muscle spasms, twitching, seeing or hearing things that are not there · Dark urine or pale stools, yellow skin or eyes · Extreme weakness, sweating, seizures, cold or clammy skin · Lightheadedness, dizziness, fainting, fever, sweating Common side effects: 
· Constipation, nausea, vomiting, loss of appetite, stomach pain · Tiredness or sleepiness © 2017 [facility] Information is for End User's use only and may not be sold, redistributed or otherwise used for commercial purposes. Please provide this summary of care documentation to your next provider. Signatures-by signing, you are acknowledging that this After Visit Summary has been reviewed with you and you have received a copy. Patient Signature:  ____________________________________________________________ Date:  ____________________________________________________________  
  
Tom Kras Provider Signature:  ____________________________________________________________ Date:  ____________________________________________________________

## 2018-07-30 NOTE — PERIOP NOTES
Out to speak with patient and family to update on case start time. Patient states that he would rather sit with his family in waiting room.

## 2018-07-30 NOTE — ANESTHESIA POSTPROCEDURE EVALUATION
Post-Anesthesia Evaluation and Assessment Patient: Rogers Bazan MRN: [id number]  SSN: [id number] YOB: 1929  Age: 80 y.o. Sex: male Cardiovascular Function/Vital Signs Visit Vitals  /64  Pulse 68  Temp 37.1 °C (98.7 °F)  Resp 12  Ht 5' 3\" (1.6 m)  Wt 77.1 kg (170 lb)  SpO2 96%  BMI 30.11 kg/m2 Patient is status post MAC anesthesia for Procedure(s): CREATION OF RECIPIENT SITE BY EXCISION OF OPEN WOUND OF FACE/ APPLICATION OF SPLIT THICKNESS SKIN GRAFT TO SCALP 
SPLIT THICKNESS SKIN GRAFT RIGHTN THIGH. Nausea/Vomiting: None Postoperative hydration reviewed and adequate. Pain: 
Pain Scale 1: Numeric (0 - 10) (07/30/18 1657) Pain Intensity 1: 0 (07/30/18 1657) Managed Neurological Status:  
Neuro (WDL): Within Defined Limits (07/30/18 1657) Neuro LUE Motor Response: Purposeful (07/30/18 1657) LLE Motor Response: Purposeful (07/30/18 1657) RUE Motor Response: Purposeful (07/30/18 1657) RLE Motor Response: Purposeful (07/30/18 1657) At baseline Mental Status and Level of Consciousness: Arousable Pulmonary Status:  
O2 Device: CO2 nasal cannula;Nasal cannula (07/30/18 1657) Adequate oxygenation and airway patent Complications related to anesthesia: None Post-anesthesia assessment completed. No concerns Signed By: Keke Garcia MD   
 July 30, 2018

## 2018-07-30 NOTE — ANESTHESIA PREPROCEDURE EVALUATION
Anesthetic History No history of anesthetic complications Review of Systems / Medical History Patient summary reviewed, nursing notes reviewed and pertinent labs reviewed Pulmonary Within defined limits Neuro/Psych Within defined limits Cardiovascular Within defined limits GI/Hepatic/Renal 
Within defined limits Endo/Other Within defined limits Other Findings Physical Exam 
 
Airway Mallampati: II 
TM Distance: > 6 cm Neck ROM: normal range of motion Mouth opening: Normal 
 
 Cardiovascular Regular rate and rhythm,  S1 and S2 normal,  no murmur, click, rub, or gallop Dental 
No notable dental hx Pulmonary Breath sounds clear to auscultation Abdominal 
GI exam deferred Other Findings Anesthetic Plan ASA: 2 Anesthesia type: MAC Anesthetic plan and risks discussed with: Patient

## 2018-07-30 NOTE — H&P
History and Physical    Subjective:     Marian Mccarthy is a 80 y.o.  male who presents with granulating scalp wound having excised SCC with craniectomy. .    Past Medical History:   Diagnosis Date    Arthritis     SHOULDERS AND SPINE    CAD (coronary artery disease) 7/1998    MI CARDIAC CATH, STENT X 2    Cancer ([address] 04/2008    chronic lymphocytic leukemia    Cancer ([address]     multiple skin cancers     Chest pain, unspecified     Chronic pain     L 2 LYTIC LESION    Edema     Hypertension     Ill-defined condition     GLUCOMA    MI (myocardial infarction) ([address]     Postsurgical percutaneous transluminal coronary angioplasty status 6/4/2012    Pre-operative cardiovascular examination     Thyroid disease     HYPO      Past Surgical History:   Procedure Laterality Date    CARDIAC SURG PROCEDURE UNLIST  7/1998    2 Cardiac stents    EGD  6/7/2010         HX ADENOIDECTOMY  1936    HX APPENDECTOMY  1954    HX CATARACT REMOVAL      bilateral    HX ORTHOPAEDIC  4/15/2008    Right hip replacement    HX OTHER SURGICAL  5/2011    Moh's procedure,     HX OTHER SURGICAL      MULTIPLES SKIN CANCER REMOVALS, MOSTLY ON HEAD    HX SKIN BIOPSY  04/27/2018    on scalp     HX TONSILLECTOMY  1936    1936     Family History   Problem Relation Age of Onset    Diabetes Mother     Heart Disease Father     Heart Attack Father 80    Lung Disease Sister      PULMONARY FIBROSIS    Heart Attack Brother     No Known Problems Sister     Anesth Problems Neg Hx       Social History   Substance Use Topics    Smoking status: Former Smoker     Packs/day: 0.50     Years: 15.00     Quit date: 12/31/1974    Smokeless tobacco: Never Used    Alcohol use No       Prior to Admission medications    Medication Sig Start Date End Date Taking? Authorizing Provider   isosorbide mononitrate (ISMO, MONOKET) 20 mg tablet Take 1 Tab by mouth two (2) times a day.   Patient taking differently: Take 20 mg by mouth two (2) times a day. PT STATES HE NEEDS MEDS EXACTLY AT 7 AM & 2 PM 6/7/18  Yes Nava Babcock NP   ezetimibe (ZETIA) 10 mg tablet TAKE ONE TABLET BY MOUTH AT NIGHT 6/4/18  Yes Sebastian Frank NP   nitroglycerin (NITROSTAT) 0.4 mg SL tablet 1 Tab by SubLINGual route every five (5) minutes as needed for Chest Pain (max 3 tabs daily for chest pain). Up to 3 doses. 5/11/18  Yes Sebastian Frank NP   amLODIPine (NORVASC) 2.5 mg tablet Take 2.5 mg by mouth daily. Yes Historical Provider   lisinopril (PRINIVIL, ZESTRIL) 10 mg tablet Take 10 mg by mouth daily. Yes Historical Provider   aspirin delayed-release 81 mg tablet Take 81 mg by mouth nightly. Yes Historical Provider   cholecalciferol (VITAMIN D3) 1,000 unit tablet Take 1,000 Units by mouth daily (with breakfast). Yes Historical Provider   timolol (TIMOPTIC) 0.5 % ophthalmic solution Administer 1 Drop to both eyes two (2) times a day. Yes Historical Provider   bimatoprost (LUMIGAN) 0.03 % ophthalmic drops Administer 1 Drop to both eyes nightly. Yes Historical Provider   acetaminophen (TYLENOL EXTRA STRENGTH) 500 mg tablet Take  by mouth every six (6) hours as needed for Pain. Yes Historical Provider   brinzolamide-brimonidine ACUITY [facility]) 1-0.2 % drps Apply 1 Drop to eye two (2) times a day. Yes Historical Provider   finasteride (PROSCAR) 5 mg tablet Take 5 mg by mouth daily. Yes Historical Provider   silodosin (RAPAFLO) 8 mg capsule Take 8 mg by mouth nightly. Yes Historical Provider   levothyroxine (SYNTHROID) 25 mcg tablet Take 25 mcg by mouth Daily (before breakfast). Yes Historical Provider     Allergies   Allergen Reactions    Statins-Hmg-Coa Reductase Inhibitors Myalgia and Other (comments)     Causes aching, generalized        Review of Systems:  A comprehensive review of systems was negative except for that written in the History of Present Illness. Objective:      Intake and Output:            Physical Exam:   Visit Vitals    /73 (BP 1 Location: Right arm, BP Patient Position: At rest)    Pulse 71    Temp 97.7 °F (36.5 °C)    Resp 16    Ht 5' 3\" (1.6 m)    Wt 77.1 kg (170 lb)    SpO2 98%    BMI 30.11 kg/m2     General:  Alert, cooperative, no distress, appears stated age. Head:   10 cm granulating scalp wound. Eyes:  Conjunctivae/corneas clear. PERRL, EOMs intact. Fundi benign   Ears:  Normal TMs and external ear canals both ears. Nose: Nares normal. Septum midline. Mucosa normal. No drainage or sinus tenderness. Throat: Lips, mucosa, and tongue normal. Teeth and gums normal.   Neck: Supple, symmetrical, trachea midline, no adenopathy, thyroid: no enlargement/tenderness/nodules, no carotid bruit and no JVD. Back:   Symmetric, no curvature. ROM normal. No CVA tenderness. Lungs:   Clear to auscultation bilaterally. Chest wall:  No tenderness or deformity. Heart:  Regular rate and rhythm, S1, S2 normal, no murmur, click, rub or gallop. Extremities: Extremities normal, atraumatic, no cyanosis or edema. Pulses: 2+ and symmetric all extremities. Skin: Skin color, texture, turgor normal. No rashes or lesions   Lymph nodes: Cervical, supraclavicular, and axillary nodes normal.   Neurologic: CNII-XII intact. Normal strength, sensation and reflexes throughout. Data Review:   No results found for this or any previous visit (from the past 24 hour(s)). Assessment:     Scalp wound defect following resection of SCC    Plan:     STSG from thigh for scalp reconstruction.      Signed By: Lissa Nunn MD     July 30, 2018

## 2018-08-19 NOTE — OP NOTES
[address]  OPERATIVE REPORT    Arsenio Garcia  MR#: [id number]  : 1929  ACCOUNT #: [de-identified]   DATE OF SERVICE: 2018    PREOPERATIVE DIAGNOSIS:  Calvarial scalp skin cancer wound defect. POSTOPERATIVE DIAGNOSIS:  Calvarial scalp skin cancer wound defect. PROCEDURES PERFORMED:  1. Wound debridement to create recipient site for skin graft. 2.  Split thickness skin graft harvested from the right thigh for scalp reconstruction:  8 x 7 cm. SURGEON:  Molly Newton MD    ASSISTANT:  none    ANESTHESIA:  Conscious sedation anesthesia. COMPLICATIONS:  No complications. ESTIMATED BLOOD LOSS:  10 mL    IMPLANTS:  No implant. SPECIMENS REMOVED:  No specimen. COMPLICATIONS:  No complications. 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 DETAILS OF PROCEDURES:  In the operating room, the patient was induced under conscious sedation anesthesia following which he was prepped and draped in a sterile fashion. 1% lidocaine with 1:100,000 epinephrine was used to inject the right upper lateral thigh over an area of 10 x 10 cm. At approximately 20 microns, the Codefied dermatome was used to remove the outer epidermis and dermis using mineral oil to lubricate the procedure and peeling a square of split thickness skin intact. The wound was cleaned and Xeroform and Tegaderm and compression dressing applied.     The scalp wound was debrided with a combination of Kitner 15 blade and tenotomy scissors removing neoepithelial tissue and debriding granulation tissue down to a clean bleeding surface throughout the entire surface area.    Antibiotic solution was then used to irrigate the entire recipient bed. The split thickness graft was then laid and closed with running locked 4-0 plain gut sutures around the circumference using mattress sutures in the depths of the granulation to minimize shear forces. A bolster dressing of Xeroform and antibiotic-soaked cotton was then applied and fixed with mattress sutures with 3-0 Prolene. The patient was then handed over to anesthesia for awakening and transferred to the recovery room.       MD Amber Vaca / ELIS  D: 08/19/2018 17:34     T: 08/19/2018 18:23  JOB #: [id number]

## 2018-09-21 ENCOUNTER — OFFICE VISIT (OUTPATIENT)
Dept: CARDIOLOGY CLINIC | Age: 83
End: 2018-09-21

## 2018-09-21 VITALS
WEIGHT: 177.2 LBS | HEIGHT: 63 IN | SYSTOLIC BLOOD PRESSURE: 190 MMHG | DIASTOLIC BLOOD PRESSURE: 82 MMHG | OXYGEN SATURATION: 98 % | HEART RATE: 56 BPM | BODY MASS INDEX: 31.4 KG/M2 | RESPIRATION RATE: 16 BRPM

## 2018-09-21 DIAGNOSIS — I10 ESSENTIAL HYPERTENSION, BENIGN: ICD-10-CM

## 2018-09-21 DIAGNOSIS — E78.2 MIXED HYPERLIPIDEMIA: Primary | ICD-10-CM

## 2018-09-21 DIAGNOSIS — I25.10 ATHEROSCLEROSIS OF NATIVE CORONARY ARTERY OF NATIVE HEART WITHOUT ANGINA PECTORIS: ICD-10-CM

## 2018-09-21 RX ORDER — BIMATOPROST 0.1 MG/ML
SOLUTION/ DROPS OPHTHALMIC
Refills: 4 | COMMUNITY
Start: 2018-09-05 | End: 2021-05-06

## 2018-09-21 RX ORDER — AMLODIPINE BESYLATE 5 MG/1
2.5 TABLET ORAL DAILY
Qty: 30 TAB | Refills: 12 | Status: SHIPPED | OUTPATIENT
Start: 2018-09-21 | End: 2018-09-28 | Stop reason: SDUPTHER

## 2018-09-21 RX ORDER — LISINOPRIL 20 MG/1
20 TABLET ORAL DAILY
Qty: 30 TAB | Refills: 12 | Status: SHIPPED | OUTPATIENT
Start: 2018-09-21 | End: 2018-09-28 | Stop reason: SDUPTHER

## 2018-09-21 NOTE — PROGRESS NOTES
Joceline Hayward MD          NAME:  Sharon Hardin   :   1929   MRN:   [id number]   PCP:  Gino Lang MD           Subjective: The patient is a 80y.o. year old male  who returns for a routine follow-up. Since the last visit, patient reports no change in exercise tolerance, chest pain, edema, medication intolerance, palpitations, shortness of breath, PND/orthopnea wheezing, sputum, syncope, dizziness or light headedness. Head feels full. No regular NSAID use. Past Medical History:   Diagnosis Date    Arthritis     SHOULDERS AND SPINE    CAD (coronary artery disease) 1998    MI CARDIAC CATH, STENT X 2    Cancer [address] 2008    chronic lymphocytic leukemia    Cancer ([facility]     multiple skin cancers     Chest pain, unspecified     Chronic pain     L 2 LYTIC LESION    Edema     Hypertension     Ill-defined condition     GLUCOMA    MI (myocardial infarction) ([facility]     Postsurgical percutaneous transluminal coronary angioplasty status 2012    Pre-operative cardiovascular examination     Thyroid disease     HYPO        ICD-10-CM ICD-9-CM    1. Mixed hyperlipidemia E78.2 272.2 AMB POC EKG ROUTINE W/ 12 LEADS, INTER & REP   2. Essential hypertension, benign I10 401.1    3.  Atherosclerosis of native coronary artery of native heart without angina pectoris I25.10 414.01       Social History   Substance Use Topics    Smoking status: Former Smoker     Packs/day: 0.50     Years: 15.00     Quit date: 1974    Smokeless tobacco: Never Used    Alcohol use No      Family History   Problem Relation Age of Onset    Diabetes Mother     Heart Disease Father     Heart Attack Father 80    Lung Disease Sister      PULMONARY FIBROSIS    Heart Attack Brother     No Known Problems Sister     Anesth Problems Neg Hx         Review of Systems  Cardiovascular: Negative except as noted in HPI      Objective:       Vitals:    18 0943 18 0947 18 0949 18 0950   BP: 180/80 180/82 190/82 190/82   Pulse:       Resp:       SpO2:       Weight:       Height:        Body mass index is 31.39 kg/(m^2). General PE  Mental Status - Alert. General Appearance - Not in acute distress. Chest and Lung Exam   Inspection: Accessory muscles - No use of accessory muscles in breathing. Auscultation:   Breath sounds: - Normal.    Cardiovascular   Inspection: Jugular vein - Bilateral - Inspection Normal.  Palpation/Percussion:   Apical Impulse: - Normal.  Auscultation: Rhythm - Regular. Heart Sounds - S1 WNL and S2 WNL. No S3 or S4. Murmurs & Other Heart Sounds: Auscultation of the heart reveals - No Murmurs. Peripheral Vascular   Upper Extremity: Inspection - Bilateral - No Cyanotic nailbeds or Digital clubbing. Lower Extremity:   Palpation: Edema - Bilateral - No edema. Data Review:     EKG -  EKG: unchanged from previous tracings, normal sinus rhythm, nonspecific ST and T waves changes. LABS- @brieflabs@      Allergies reviewed  Allergies   Allergen Reactions    Statins-Hmg-Coa Reductase Inhibitors Myalgia and Other (comments)     Causes aching, generalized       Medications reviewed  Current Outpatient Prescriptions   Medication Sig    LUMIGAN 0.01 % ophthalmic drops INSTILL 1 DROP INTO BOTH EYES NIGHTLY    amLODIPine (NORVASC) 5 mg tablet Take 0.5 Tabs by mouth daily.  lisinopril (PRINIVIL, ZESTRIL) 20 mg tablet Take 1 Tab by mouth daily.  HYDROcodone-acetaminophen (NORCO) 7.5-325 mg per tablet Take 2 Tabs by mouth every six (6) hours as needed for Pain for up to 30 doses. Max Daily Amount: 8 Tabs. Indications: Pain    isosorbide mononitrate (ISMO, MONOKET) 20 mg tablet Take 1 Tab by mouth two (2) times a day. (Patient taking differently: Take 20 mg by mouth two (2) times a day.  PT STATES HE NEEDS MEDS EXACTLY AT 7 AM & 2 PM)    ezetimibe (ZETIA) 10 mg tablet TAKE ONE TABLET BY MOUTH AT NIGHT    nitroglycerin (NITROSTAT) 0.4 mg SL tablet 1 Tab by SubLINGual route every five (5) minutes as needed for Chest Pain (max 3 tabs daily for chest pain). Up to 3 doses.  aspirin delayed-release 81 mg tablet Take 81 mg by mouth nightly.  cholecalciferol (VITAMIN D3) 1,000 unit tablet Take 1,000 Units by mouth daily (with breakfast).  timolol (TIMOPTIC) 0.5 % ophthalmic solution Administer 1 Drop to both eyes two (2) times a day.  bimatoprost (LUMIGAN) 0.03 % ophthalmic drops Administer 1 Drop to both eyes nightly.  acetaminophen (TYLENOL EXTRA STRENGTH) 500 mg tablet Take  by mouth every six (6) hours as needed for Pain.  brinzolamide-brimonidine (SIMBRINZA) 1-0.2 % drps Apply 1 Drop to eye two (2) times a day.  finasteride (PROSCAR) 5 mg tablet Take 5 mg by mouth daily.  silodosin (RAPAFLO) 8 mg capsule Take 8 mg by mouth nightly.  levothyroxine (SYNTHROID) 25 mcg tablet Take 25 mcg by mouth Daily (before breakfast). No current facility-administered medications for this visit. Assessment:       ICD-10-CM ICD-9-CM    1. Mixed hyperlipidemia E78.2 272.2 AMB POC EKG ROUTINE W/ 12 LEADS, INTER & REP   2. Essential hypertension, benign I10 401.1    3. Atherosclerosis of native coronary artery of native heart without angina pectoris I25.10 414.01         Orders Placed This Encounter    AMB POC EKG ROUTINE W/ 12 LEADS, INTER & REP     Order Specific Question:   Reason for Exam:     Answer:   routine    LUMIGAN 0.01 % ophthalmic drops     Sig: INSTILL 1 DROP INTO BOTH EYES NIGHTLY     Refill:  4    amLODIPine (NORVASC) 5 mg tablet     Sig: Take 0.5 Tabs by mouth daily. Dispense:  30 Tab     Refill:  12    lisinopril (PRINIVIL, ZESTRIL) 20 mg tablet     Sig: Take 1 Tab by mouth daily. Dispense:  30 Tab     Refill:  12       Plan:     BP uncontrolled. Double lisinopril and amlodipine. BP check next week. EKG OK. CAD asymptomatic.     David Germain MD

## 2018-09-21 NOTE — PROGRESS NOTES
Chief Complaint   Patient presents with    Hypertension     6 month follow up, C/O left foot swelling and dizziness at times     1. Have you been to the ER, urgent care clinic since your last visit? Hospitalized since your last visit? Yes When: 7/30/2018 [facility] admit     2. Have you seen or consulted any other health care providers outside of the [address] [place] [address] since your last visit? Include any pap smears or colon screening.  No

## 2018-09-28 ENCOUNTER — CLINICAL SUPPORT (OUTPATIENT)
Dept: CARDIOLOGY CLINIC | Age: 83
End: 2018-09-28

## 2018-09-28 VITALS
HEART RATE: 67 BPM | OXYGEN SATURATION: 96 % | BODY MASS INDEX: 30.95 KG/M2 | HEIGHT: 63 IN | SYSTOLIC BLOOD PRESSURE: 170 MMHG | RESPIRATION RATE: 18 BRPM | WEIGHT: 174.7 LBS | DIASTOLIC BLOOD PRESSURE: 80 MMHG

## 2018-09-28 DIAGNOSIS — I25.10 ASHD (ARTERIOSCLEROTIC HEART DISEASE): ICD-10-CM

## 2018-09-28 DIAGNOSIS — I10 ESSENTIAL HYPERTENSION, BENIGN: Primary | ICD-10-CM

## 2018-09-28 RX ORDER — LISINOPRIL 40 MG/1
40 TABLET ORAL DAILY
Qty: 30 TAB | Refills: 5
Start: 2018-09-28 | End: 2018-11-12 | Stop reason: SDUPTHER

## 2018-09-28 RX ORDER — AMLODIPINE BESYLATE 5 MG/1
5 TABLET ORAL DAILY
Qty: 30 TAB | Refills: 12
Start: 2018-09-28 | End: 2018-10-05 | Stop reason: SDUPTHER

## 2018-09-28 NOTE — PROGRESS NOTES
1. Have you been to the ER, urgent care clinic since your last visit? Hospitalized since your last visit? no 
 
2. Have you seen or consulted any other health care providers outside of the [facility] since your last visit? Include any pap smears or colon screening. no 
 
C/o slight swelling in left ankle.

## 2018-09-28 NOTE — PROGRESS NOTES
[address] [address]  [phone] Subjective:  
  
Parag Washington is a 80 y.o. male is here for BP check. The patient denies chest pain/ shortness of breath, orthopnea, PND, LE edema, palpitations, syncope, or presyncope. Patient Active Problem List  
 Diagnosis Date Noted  SCC (squamous cell carcinoma), scalp/neck 06/18/2018  Squamous cell cancer of scalp and skin of neck 06/11/2018  Pneumonia, organism unspecified(486) 03/27/2013  Temporal arteritis ([address] 03/27/2013  Postsurgical percutaneous transluminal coronary angioplasty status 06/04/2012  Mixed hyperlipidemia 06/04/2012  Essential hypertension, benign 06/04/2012  Coronary atherosclerosis of native coronary artery 06/04/2012 Xenia Lyn MD 
Past Medical History:  
Diagnosis Date  Arthritis [address]  CAD (coronary artery disease) 7/1998 MI CARDIAC CATH, STENT X 2  
 Cancer ([address] 04/2008  
 chronic lymphocytic leukemia  Cancer ([address] multiple skin cancers  Chest pain, unspecified  Chronic pain L 2 LYTIC LESION  
 Edema  Hypertension  Ill-defined condition GLUCOMA  MI (myocardial infarction) ([address]  Postsurgical percutaneous transluminal coronary angioplasty status 6/4/2012  Pre-operative cardiovascular examination  Thyroid disease HYPO Past Surgical History:  
Procedure Laterality Date  CARDIAC SURG PROCEDURE UNLIST  7/1998 2 Cardiac stents  EGD  6/7/2010  HX ADENOIDECTOMY  [id number] [place]  HX CATARACT REMOVAL    
 bilateral  
 HX ORTHOPAEDIC  4/15/2008 Right hip replacement  HX OTHER SURGICAL  5/2011 Moh's procedure,   
 HX OTHER SURGICAL MULTIPLES SKIN CANCER REMOVALS, MOSTLY ON HEAD  
 HX SKIN BIOPSY  04/27/2018 on scalp  HX TONSILLECTOMY  [id number] Allergies Allergen Reactions  Statins-Hmg-Coa Reductase Inhibitors Myalgia and Other (comments) Causes aching, generalized Family History Problem Relation Age of Onset  Diabetes Mother  Heart Disease Father  Heart Attack Father 80  Lung Disease Sister PULMONARY FIBROSIS  
 Heart Attack Brother  No Known Problems Sister  Anesth Problems Neg Hx Social History Social History  Marital status:  Spouse name: N/A  
 Number of children: N/A  
 Years of education: N/A Occupational History  Not on file. Social History Main Topics  Smoking status: Former Smoker Packs/day: 0.50 Years: 15.00 Quit date: 12/31/1974  Smokeless tobacco: Never Used  Alcohol use No  
 Drug use: No  
 Sexual activity: Not on file Other Topics Concern  Not on file Social History Narrative Current Outpatient Prescriptions Medication Sig  LUMIGAN 0.01 % ophthalmic drops INSTILL 1 DROP INTO BOTH EYES NIGHTLY  amLODIPine (NORVASC) 5 mg tablet Take 0.5 Tabs by mouth daily.  lisinopril (PRINIVIL, ZESTRIL) 20 mg tablet Take 1 Tab by mouth daily.  HYDROcodone-acetaminophen (NORCO) 7.5-325 mg per tablet Take 2 Tabs by mouth every six (6) hours as needed for Pain for up to 30 doses. Max Daily Amount: 8 Tabs. Indications: Pain  isosorbide mononitrate (ISMO, MONOKET) 20 mg tablet Take 1 Tab by mouth two (2) times a day. (Patient taking differently: Take 20 mg by mouth two (2) times a day. PT STATES HE NEEDS MEDS EXACTLY AT 7 AM & 2 PM)  ezetimibe (ZETIA) 10 mg tablet TAKE ONE TABLET BY MOUTH AT NIGHT  nitroglycerin (NITROSTAT) 0.4 mg SL tablet 1 Tab by SubLINGual route every five (5) minutes as needed for Chest Pain (max 3 tabs daily for chest pain). Up to 3 doses.  aspirin delayed-release 81 mg tablet Take 81 mg by mouth nightly.  cholecalciferol (VITAMIN D3) 1,000 unit tablet Take 1,000 Units by mouth daily (with breakfast).  timolol (TIMOPTIC) 0.5 % ophthalmic solution Administer 1 Drop to both eyes two (2) times a day.  bimatoprost (LUMIGAN) 0.03 % ophthalmic drops Administer 1 Drop to both eyes nightly.  acetaminophen (TYLENOL EXTRA STRENGTH) 500 mg tablet Take  by mouth every six (6) hours as needed for Pain.  brinzolamide-brimonidine (SIMBRINZA) 1-0.2 % drps Apply 1 Drop to eye two (2) times a day.  finasteride (PROSCAR) 5 mg tablet Take 5 mg by mouth daily.  silodosin (RAPAFLO) 8 mg capsule Take 8 mg by mouth nightly.  levothyroxine (SYNTHROID) 25 mcg tablet Take 25 mcg by mouth Daily (before breakfast). No current facility-administered medications for this visit. Review of Symptoms: 
11 systems reviewed, negative other than as stated in the HPI Physical ExamPhysical Exam:   
Vitals:  
 09/28/18 1093 09/28/18 4800 09/28/18 2806 BP: 178/80 174/82 170/80 Pulse: 67 Resp: 18 SpO2: 96% Weight: 174 lb 11.2 oz (79.2 kg) Height: 5' 3\" (1.6 m) Body mass index is 30.95 kg/(m^2). General PE Gen:  NAD Mental Status - Alert. General Appearance - Not in acute distress. Chest and Lung Exam  
Inspection: Accessory muscles - No use of accessory muscles in breathing. Auscultation:  
Breath sounds: - Normal.  
Cardiovascular Inspection: Jugular vein - Bilateral - Inspection Normal.  
Palpation/Percussion:  
Apical Impulse: - Normal.  
Auscultation: Rhythm - Regular. Heart Sounds - S1 WNL and S2 WNL. No S3 or S4. Murmurs & Other Heart Sounds: Auscultation of the heart reveals - No Murmurs. Peripheral Vascular Upper Extremity: Inspection - Bilateral - No Cyanotic nailbeds or Digital clubbing. Lower Extremity:  
Palpation: Edema - Bilateral - No edema. Abdomen:   Soft, non-tender, bowel sounds are active. Neuro: A&O times 3, CN and motor grossly WNL Labs:  
Lab Results Component Value Date/Time  Cholesterol, total 150 08/23/2017 04:24 PM  
 Cholesterol, total 132 09/06/2013 09:19 AM  
 HDL Cholesterol 39 (L) 08/23/2017 04:24 PM  
 HDL Cholesterol 60 09/06/2013 09:19 AM  
 LDL, calculated 87 08/23/2017 04:24 PM  
 LDL, calculated 61 09/06/2013 09:19 AM  
 Triglyceride 119 08/23/2017 04:24 PM  
 Triglyceride 57 09/06/2013 09:19 AM  
 
Lab Results Component Value Date/Time  08/21/2015 06:49 PM  
 
Lab Results Component Value Date/Time Sodium 140 06/15/2018 12:45 PM  
 Potassium 4.7 06/15/2018 12:45 PM  
 Chloride 104 06/15/2018 12:45 PM  
 CO2 27 06/15/2018 12:45 PM  
 Anion gap 9 06/15/2018 12:45 PM  
 Glucose 91 06/15/2018 12:45 PM  
 BUN 25 (H) 06/15/2018 12:45 PM  
 Creatinine 0.96 06/15/2018 12:45 PM  
 BUN/Creatinine ratio 26 (H) 06/15/2018 12:45 PM  
 GFR est AA >60 06/15/2018 12:45 PM  
 GFR est non-AA >60 06/15/2018 12:45 PM  
 Calcium 8.7 06/15/2018 12:45 PM  
 Bilirubin, total 0.4 06/15/2018 12:45 PM  
 AST (SGOT) 16 06/15/2018 12:45 PM  
 Alk. phosphatase 74 06/15/2018 12:45 PM  
 Protein, total 5.8 (L) 06/15/2018 12:45 PM  
 Albumin 3.3 (L) 06/15/2018 12:45 PM  
 Globulin 2.5 06/15/2018 12:45 PM  
 A-G Ratio 1.3 06/15/2018 12:45 PM  
 ALT (SGPT) 17 06/15/2018 12:45 PM  
 
 
EKG: 
n/a Assessment: 
 
 Assessment: 1. Essential hypertension, benign 2. ASHD (arteriosclerotic heart disease) No orders of the defined types were placed in this encounter. Plan: Pt presents for BP check. During last office visit,  His SBP was running in the 190s, so his Amlodipine was uptitrated to 5 mg daily and his Lisinopril was doubled to 40 mg daily. He did not  Increase his Amlodipine. Today, SBP improved to 170s, will increase Amlodipine to 5 mg as previously planned and he will f/u in one week with me for BP check Continue current care and f/u in 1 wk.  
 
Margaret Delarosa NP

## 2018-09-28 NOTE — MR AVS SNAPSHOT
Tequila Guadalupe [address] [facility] 
[phone] Patient: Audrey Howell MRN: [id number] SEV:[id number] Visit Information Date & Time Provider Department Dept. Phone Encounter #  
 9/28/2018  8:45 AM Umer Carrero NP [facility] [phone] Your Appointments 3/25/2019  9:00 AM  
ESTABLISHED PATIENT with Da Baptiste MD  
[facility] [address] [place] [address]) Appt Note: 6 month f/u 09/21/2018 kb  
 [address]  
[phone] [address] Upcoming Health Maintenance Date Due DTaP/Tdap/Td series (1 - Tdap) 1/11/1950 Shingrix Vaccine Age 50> (1 of 2) 1/11/1979 GLAUCOMA SCREENING Q2Y 1/11/1994 Pneumococcal 65+ High/Highest Risk (2 of 2 - PPSV23) 1/1/2008 MEDICARE YEARLY EXAM 3/14/2018 Influenza Age 5 to Adult 8/1/2018 Allergies as of 9/28/2018  Review Complete On: 9/28/2018 By: Arnoldo Ovalle LPN Severity Noted Reaction Type Reactions Statins-hmg-coa Reductase Inhibitors Medium 04/15/2010   Side Effect Myalgia, Other (comments) Causes aching, generalized Current Immunizations  Reviewed on 3/27/2013 Name Date H1N1 FLU VACCINE 11/2/2009 Influenza Vaccine  Deferred (Patient Refused) Influenza Vaccine Whole 12/2/2009 Pneumococcal Vaccine (Unspecified Type) 1/1/2003 Not reviewed this visit You Were Diagnosed With   
  
 Codes Comments Essential hypertension, benign    -  Primary ICD-10-CM: I10 
ICD-9-CM: 401.1 ASHD (arteriosclerotic heart disease)     ICD-10-CM: I25.10 ICD-9-CM: 414.00 Vitals BP Pulse Resp Height(growth percentile) Weight(growth percentile) SpO2  
 170/80 67 18 5' 3\" (1.6 m) 174 lb 11.2 oz (79.2 kg) 96% BMI Smoking Status 30.95 kg/m2 Former Smoker Vitals History BMI and BSA Data Body Mass Index Body Surface Area 30.95 kg/m 2 1.88 m 2 Preferred Pharmacy Pharm[facility] Name Phone [facility] [address] [place] [facility] [address] , [place] [address] [phone] Your Updated Medication List  
  
   
This list is accurate as of 9/28/18  8:59 AM.  Always use your most recent med list. amLODIPine 5 mg tablet Commonly known as:  Tad Bernadine Take 1 Tab by mouth daily. Increases 9/28/18  
  
 aspirin delayed-release 81 mg tablet Take 81 mg by mouth nightly.  
  
 ezetimibe 10 mg tablet Commonly known as:  Lisha Billow TAKE ONE TABLET BY MOUTH AT NIGHT  
  
 finasteride 5 mg tablet Commonly known as:  PROSCAR Take 5 mg by mouth daily. HYDROcodone-acetaminophen 7.5-325 mg per tablet Commonly known as:  Anum Patel Take 2 Tabs by mouth every six (6) hours as needed for Pain for up to 30 doses. Max Daily Amount: 8 Tabs. Indications: Pain  
  
 isosorbide mononitrate 20 mg tablet Commonly known as:  ISMO, MONOKET Take 1 Tab by mouth two (2) times a day. levothyroxine 25 mcg tablet Commonly known as:  SYNTHROID Take 25 mcg by mouth Daily (before breakfast). lisinopril 40 mg tablet Commonly known as:  Dilan Vini Take 1 Tab by mouth daily. * LUMIGAN 0.03 % ophthalmic drops Generic drug:  bimatoprost  
Administer 1 Drop to both eyes nightly. * LUMIGAN 0.01 % ophthalmic drops Generic drug:  bimatoprost  
INSTILL 1 DROP INTO BOTH EYES NIGHTLY  
  
 nitroglycerin 0.4 mg SL tablet Commonly known as:  NITROSTAT  
1 Tab by SubLINGual route every five (5) minutes as needed for Chest Pain (max 3 tabs daily for chest pain). Up to 3 doses. RAPAFLO 8 mg capsule Generic drug:  silodosin Take 8 mg by mouth nightly. SIMBRINZA 1-0.2 % Drps Generic drug:  brinzolamide-brimonidine Apply 1 Drop to eye two (2) times a day. timolol 0.5 % ophthalmic solution Commonly known as:  TIMOPTIC  
 Administer 1 Drop to both eyes two (2) times a day. TYLENOL EXTRA STRENGTH 500 mg tablet Generic drug:  acetaminophen Take  by mouth every six (6) hours as needed for Pain. VITAMIN D3 1,000 unit tablet Generic drug:  cholecalciferol Take 1,000 Units by mouth daily (with breakfast). * Notice: This list has 2 medication(s) that are the same as other medications prescribed for you. Read the directions carefully, and ask your doctor or other care provider to review them with you. Introducing hospitals & HEALTH SERVICES! [facility] introduces [employer] patient portal. Now you can access parts of your medical record, email your doctor's office, and request medication refills online. 1. In your internet browser, go to [email or web address] 2. Click on the First Time User? Click Here link in the Sign In box. You will see the New Member Sign Up page. 3. Enter your GreenGar Access Code exactly as it appears below. You will not need to use this code after youve completed the sign-up process. If you do not sign up before the expiration date, you must request a new code. · GreenGar Access Code: [email or web address] Expires: 12/20/2018 10:10 AM 
 
4. Enter the last four digits of your Social Security Number (xxxx) and Date of Birth (mm/dd/yyyy) as indicated and click Submit. You will be taken to the next sign-up page. 5. Create a GreenGar ID. This will be your GreenGar login ID and cannot be changed, so think of one that is secure and easy to remember. 6. Create a GreenGar password. You can change your password at any time. 7. Enter your Password Reset Question and Answer. This can be used at a later time if you forget your password. 8. Enter your e-mail address. You will receive e-mail notification when new information is available in [address] 9. Click Sign Up. You can now view and download portions of your medical record. 10. Click the Download Summary menu link to download a portable copy of your medical information. If you have questions, please visit the Frequently Asked Questions section of the [email or web address] website. Remember, [email or web address] is NOT to be used for urgent needs. For medical emergencies, dial 911. Now available from your iPhone and Android! Please provide this summary of care documentation to your next provider. Your primary care clinician is listed as Robin Muller. If you have any questions after today's visit, please call [phone].

## 2018-10-05 ENCOUNTER — CLINICAL SUPPORT (OUTPATIENT)
Dept: CARDIOLOGY CLINIC | Age: 83
End: 2018-10-05

## 2018-10-05 VITALS
SYSTOLIC BLOOD PRESSURE: 158 MMHG | HEIGHT: 63 IN | HEART RATE: 58 BPM | WEIGHT: 174.6 LBS | DIASTOLIC BLOOD PRESSURE: 82 MMHG | BODY MASS INDEX: 30.94 KG/M2 | OXYGEN SATURATION: 97 %

## 2018-10-05 DIAGNOSIS — I10 HYPERTENSION, UNSPECIFIED TYPE: Primary | ICD-10-CM

## 2018-10-05 RX ORDER — AMLODIPINE BESYLATE 10 MG/1
10 TABLET ORAL DAILY
Qty: 30 TAB | Refills: 3 | Status: SHIPPED | OUTPATIENT
Start: 2018-10-05 | End: 2018-10-30 | Stop reason: SDUPTHER

## 2018-10-05 NOTE — PROGRESS NOTES
Chief Complaint   Patient presents with    Hypertension     BP CHECK     1. Have you been to the ER, urgent care clinic since your last visit? Hospitalized since your last visit? NO    2. Have you seen or consulted any other health care providers outside of the [address] [place] [address] since your last visit? Include any pap smears or colon screening.  NO

## 2018-10-05 NOTE — MR AVS SNAPSHOT
[address] 
[phone] Patient: Vasquez Briggs MRN: [id number] DYN:[id number] Visit Information Date & Time Provider Department Dept. Phone Encounter #  
 10/5/2018  8:45 AM Armando Epperson NP [facility] [phone] [id number] Your Appointments 11/8/2018  9:00 AM  
BLOOD PRESSURE with Grant Cohen NP [facility] Appt Note: Per Marco Antonio De La Vega, 1 mo BP check-scc [address] [facility]  
[phone] [address] [facility]  
  
    
 3/25/2019  9:00 AM  
ESTABLISHED PATIENT with Graciela Garnett MD  
[facility]) Appt Note: 6 month f/u 09/21/2018 kb  
 [address] [facility]  
[phone] [address] [facility] Upcoming Health Maintenance Date Due DTaP/Tdap/Td series (1 - Tdap) 1/11/1950 Shingrix Vaccine Age 50> (1 of 2) 1/11/1979 GLAUCOMA SCREENING Q2Y 1/11/1994 Pneumococcal 65+ High/Highest Risk (2 of 2 - PPSV23) 1/1/2008 MEDICARE YEARLY EXAM 3/14/2018 Influenza Age 5 to Adult 8/1/2018 Allergies as of 10/5/2018  Review Complete On: 10/5/2018 By: Daniel Kim Severity Noted Reaction Type Reactions Statins-hmg-coa Reductase Inhibitors Medium 04/15/2010   Side Effect Myalgia, Other (comments) Causes aching, generalized Current Immunizations  Reviewed on 3/27/2013 Name Date H1N1 FLU VACCINE 11/2/2009 Influenza Vaccine  Deferred (Patient Refused) Influenza Vaccine Whole 12/2/2009 Pneumococcal Vaccine (Unspecified Type) 1/1/2003 Not reviewed this visit Vitals  BP Pulse Height(growth percentile) Weight(growth percentile) SpO2 BMI  
 176/70 (BP 1 Location: Left arm, BP Patient Position: Sitting) (!) 58 5' 3\" (1.6 m) 174 lb 9.6 oz (79.2 kg) 97% 30.93 kg/m2 Smoking Status Former Smoker Vitals History BMI and BSA Data Body Mass Index Body Surface Area 30.93 kg/m 2 1.88 m 2 Preferred Pharmacy Pharmacy Name Phone [facility] [address] [place] [facility] [place] [address] [phone] Your Updated Medication List  
  
   
This list is accurate as of 10/5/18  9:24 AM.  Always use your most recent med list. amLODIPine 10 mg tablet Commonly known as:  Skyler Sampson Take 1 Tab by mouth daily. Increases 9/28/18  
  
 aspirin delayed-release 81 mg tablet Take 81 mg by mouth nightly.  
  
 ezetimibe 10 mg tablet Commonly known as:  Jesus Ortega TAKE ONE TABLET BY MOUTH AT NIGHT  
  
 finasteride 5 mg tablet Commonly known as:  PROSCAR Take 5 mg by mouth daily. HYDROcodone-acetaminophen 7.5-325 mg per tablet Commonly known as:  Casey Side Take 2 Tabs by mouth every six (6) hours as needed for Pain for up to 30 doses. Max Daily Amount: 8 Tabs. Indications: Pain  
  
 isosorbide mononitrate 20 mg tablet Commonly known as:  ISMO, MONOKET Take 1 Tab by mouth two (2) times a day. levothyroxine 25 mcg tablet Commonly known as:  SYNTHROID Take 25 mcg by mouth Daily (before breakfast). lisinopril 40 mg tablet Commonly known as:  Milon Kitchen Take 1 Tab by mouth daily. * LUMIGAN 0.03 % ophthalmic drops Generic drug:  bimatoprost  
Administer 1 Drop to both eyes nightly. * LUMIGAN 0.01 % ophthalmic drops Generic drug:  bimatoprost  
INSTILL 1 DROP INTO BOTH EYES NIGHTLY  
  
 nitroglycerin 0.4 mg SL tablet Commonly known as:  NITROSTAT  
1 Tab by SubLINGual route every five (5) minutes as needed for Chest Pain (max 3 tabs daily for chest pain). Up to 3 doses. RAPAFLO 8 mg capsule Generic drug:  silodosin Take 8 mg by mouth nightly. SIMBRINZA 1-0.2 % Drps Generic drug:  brinzolamide-brimonidine Apply 1 Drop to eye two (2) times a day. timolol 0.5 % ophthalmic solution Commonly known as:  TIMOPTIC Administer 1 Drop to both eyes two (2) times a day. TYLENOL EXTRA STRENGTH 500 mg tablet Generic drug:  acetaminophen Take  by mouth every six (6) hours as needed for Pain. VITAMIN D3 1,000 unit tablet Generic drug:  cholecalciferol Take 1,000 Units by mouth daily (with breakfast). * Notice: This list has 2 medication(s) that are the same as other medications prescribed for you. Read the directions carefully, and ask your doctor or other care provider to review them with you. Prescriptions Sent to Pharmacy Refills  
 amLODIPine (NORVASC) 10 mg tablet 3 Sig: Take 1 Tab by mouth daily. Increases 9/28/18 Class: Normal  
 Pharmacy: [facility] [address] [place] [facility] [address], [address] [facility] #: [phone] Route: Oral  
  
Patient Instructions FOR TODAY ONLY TAKE AN ADDITIONAL 5mg OF AMLODIPINE TONIGHT. STARTING 10/6/18 TAKE 10mg AT NIGHT. Introducing [facility] & HEALTH SERVICES! [employer] introduces [employer] patient portal. Now you can access parts of your medical record, email your doctor's office, and request medication refills online. 1. In your internet browser, go to [email or web address] 2. Click on the First Time User? Click Here link in the Sign In box. You will see the New Member Sign Up page. 3. Enter your [employer] Access Code exactly as it appears below. You will not need to use this code after youve completed the sign-up process. If you do not sign up before the expiration date, you must request a new code. · [employer] Access Code: [email or web address] Expires: 12/20/2018 10:10 AM 
 
4. Enter the last four digits of your Social Security Number (xxxx) and Date of Birth (mm/dd/yyyy) as indicated and click Submit. You will be taken to the next sign-up page. 5. Create a NetPress Digital ID. This will be your [employer] login ID and cannot be changed, so think of one that is secure and easy to remember. 6. Create a [employer] password. You can change your password at any time. 7. Enter your Password Reset Question and Answer. This can be used at a later time if you forget your password. 8. Enter your e-mail address. You will receive e-mail notification when new information is available in [address] 9. Click Sign Up. You can now view and download portions of your medical record. 10. Click the Download Summary menu link to download a portable copy of your medical information. If you have questions, please visit the Frequently Asked Questions section of the [employer] website. Remember, [employer] is NOT to be used for urgent needs. For medical emergencies, dial 911. Now available from your iPhone and Android! Please provide this summary of care documentation to your next provider. Your primary care clinician is listed as Ari Singer. If you have any questions after today's visit, please call [phone].

## 2018-10-05 NOTE — PROGRESS NOTES
Ge Jackson NP  Subjective:    Javier Al is a 80 y.o. male is here for a blood pressure check.   The patient denies chest pain or shortness of breath     Patient Active Problem List    Diagnosis Date Noted    SCC (squamous cell carcinoma), scalp/neck 06/18/2018    Squamous cell cancer of scalp and skin of neck 06/11/2018    Pneumonia, organism unspecified(486) 03/27/2013    Temporal arteritis ([address] 03/27/2013    Postsurgical percutaneous transluminal coronary angioplasty status 06/04/2012    Mixed hyperlipidemia 06/04/2012    Essential hypertension, benign 06/04/2012    Coronary atherosclerosis of native coronary artery 06/04/2012      Past Medical History:   Diagnosis Date    Arthritis     SHOULDERS AND SPINE    CAD (coronary artery disease) 7/1998    MI CARDIAC CATH, STENT X 2    Cancer ([address] 04/2008    chronic lymphocytic leukemia    Cancer (HCC)     multiple skin cancers     Chest pain, unspecified     Chronic pain     L 2 LYTIC LESION    Edema     Hypertension     Ill-defined condition     GLUCOMA    MI (myocardial infarction) ([address]     Postsurgical percutaneous transluminal coronary angioplasty status 6/4/2012    Pre-operative cardiovascular examination     Thyroid disease     HYPO      Past Surgical History:   Procedure Laterality Date    CARDIAC SURG PROCEDURE UNLIST  7/1998    2 Cardiac stents    EGD  6/7/2010         HX ADENOIDECTOMY  1936    HX APPENDECTOMY  1954    HX CATARACT REMOVAL      bilateral    HX ORTHOPAEDIC  4/15/2008    Right hip replacement    HX OTHER SURGICAL  5/2011    Moh's procedure,     HX OTHER SURGICAL      MULTIPLES SKIN CANCER REMOVALS, MOSTLY ON HEAD    HX SKIN BIOPSY  04/27/2018    on scalp     HX TONSILLECTOMY  1936    1936     Allergies   Allergen Reactions    Statins-Hmg-Coa Reductase Inhibitors Myalgia and Other (comments)     Causes aching, generalized      Family History   Problem Relation Age of Onset    Diabetes Mother    [facility] Heart Disease Father     Heart Attack Father 80    Lung Disease Sister      PULMONARY FIBROSIS    Heart Attack Brother     No Known Problems Sister     Anesth Problems Neg Hx       Current Outpatient Prescriptions   Medication Sig    amLODIPine (NORVASC) 10 mg tablet Take 1 Tab by mouth daily. Increases 9/28/18    lisinopril (PRINIVIL, ZESTRIL) 40 mg tablet Take 1 Tab by mouth daily.  LUMIGAN 0.01 % ophthalmic drops INSTILL 1 DROP INTO BOTH EYES NIGHTLY    HYDROcodone-acetaminophen (NORCO) 7.5-325 mg per tablet Take 2 Tabs by mouth every six (6) hours as needed for Pain for up to 30 doses. Max Daily Amount: 8 Tabs. Indications: Pain    isosorbide mononitrate (ISMO, MONOKET) 20 mg tablet Take 1 Tab by mouth two (2) times a day. (Patient taking differently: Take 20 mg by mouth two (2) times a day. PT STATES HE NEEDS MEDS EXACTLY AT 7 AM & 2 PM)    ezetimibe (ZETIA) 10 mg tablet TAKE ONE TABLET BY MOUTH AT NIGHT    nitroglycerin (NITROSTAT) 0.4 mg SL tablet 1 Tab by SubLINGual route every five (5) minutes as needed for Chest Pain (max 3 tabs daily for chest pain). Up to 3 doses.  aspirin delayed-release 81 mg tablet Take 81 mg by mouth nightly.  cholecalciferol (VITAMIN D3) 1,000 unit tablet Take 1,000 Units by mouth daily (with breakfast).  timolol (TIMOPTIC) 0.5 % ophthalmic solution Administer 1 Drop to both eyes two (2) times a day.  bimatoprost (LUMIGAN) 0.03 % ophthalmic drops Administer 1 Drop to both eyes nightly.  acetaminophen (TYLENOL EXTRA STRENGTH) 500 mg tablet Take  by mouth every six (6) hours as needed for Pain.  brinzolamide-brimonidine (SIMBRINZA) 1-0.2 % drps Apply 1 Drop to eye two (2) times a day.  finasteride (PROSCAR) 5 mg tablet Take 5 mg by mouth daily.  silodosin (RAPAFLO) 8 mg capsule Take 8 mg by mouth nightly.  levothyroxine (SYNTHROID) 25 mcg tablet Take 25 mcg by mouth Daily (before breakfast).      No current facility-administered medications for this visit. Vitals:    10/05/18 0852 10/05/18 0856   BP: 176/78 176/70   Pulse: (!) 58    SpO2: 97%    Weight: 174 lb 9.6 oz (79.2 kg)    Height: 5' 3\" (1.6 m)      Social History     Social History    Marital status:      Spouse name: N/A    Number of children: N/A    Years of education: N/A     Occupational History    Not on file. Social History Main Topics    Smoking status: Former Smoker     Packs/day: 0.50     Years: 15.00     Quit date: 12/31/1974    Smokeless tobacco: Never Used    Alcohol use No    Drug use: No    Sexual activity: Not on file     Other Topics Concern    Not on file     Social History Narrative       I have reviewed the nurses notes, vitals, problem list, allergy list, medical history, social history and medications. Review of Symptoms:    General: Pt denies excessive weight gain or loss. Pt is able to conduct ADL's  Respiratory: Denies shortness of breath, THOMSON, wheezing or stridor. Cardiovascular: Denies precordial pain, palpitations, edema or PND    Physical ExamPhysical Exam:      General: Well developed, in no acute distress, Alert  Heart:  Normal S1/S2 negative S3 or S4.   Lungs:  Clear bilaterally, no wheeze  Neuro: A&Ox3, speech clear, gait stable. Assessment:   Assessment:        Plan:     Blood pressure readings remain elevated. Repeat 158/82 with 10 minutes of rest.  Will further titrate amlodipine to 10 mg. Follow-up in 1 month.   Should additional therapy be necessary consider the addition of hydrochlorothiazide and changing an ACE inhibitor to a more aggressive ARB    Kemal Shi NP

## 2018-10-10 ENCOUNTER — TELEPHONE (OUTPATIENT)
Dept: CARDIOLOGY CLINIC | Age: 83
End: 2018-10-10

## 2018-10-10 NOTE — TELEPHONE ENCOUNTER
Pt has concerns regarding norvasc prescription. He says he's having a hard time tolerating it.   Thanks, [facility]

## 2018-10-11 NOTE — TELEPHONE ENCOUNTER
Patient called back and would \"very much like to speak with Mr. Adela Goldman", not tolerating the 10mg @ night, but 5mg in the morning and 5mg in the evening seems to be working fine. I explained how he is still receiving the same amount and that should not be a problem, however I would tell Mr. Melisa Walter. I advised him either Erving Yolette or nurse would call him back. Thanks!

## 2018-10-12 NOTE — TELEPHONE ENCOUNTER
Spoke to patient using 2 identifiers. Per Jaiden Ferrari NP, he was made aware that he can take the 5 mg twice a day. Patient verbalized understanding.

## 2018-10-30 RX ORDER — AMLODIPINE BESYLATE 10 MG/1
10 TABLET ORAL DAILY
Qty: 90 TAB | Refills: 3 | Status: SHIPPED | OUTPATIENT
Start: 2018-10-30 | End: 2018-11-08

## 2018-11-08 ENCOUNTER — OFFICE VISIT (OUTPATIENT)
Dept: CARDIOLOGY CLINIC | Age: 83
End: 2018-11-08

## 2018-11-08 VITALS
DIASTOLIC BLOOD PRESSURE: 60 MMHG | OXYGEN SATURATION: 96 % | WEIGHT: 176.3 LBS | HEIGHT: 63 IN | HEART RATE: 57 BPM | BODY MASS INDEX: 31.24 KG/M2 | SYSTOLIC BLOOD PRESSURE: 128 MMHG | RESPIRATION RATE: 18 BRPM

## 2018-11-08 DIAGNOSIS — I25.10 ASHD (ARTERIOSCLEROTIC HEART DISEASE): Primary | ICD-10-CM

## 2018-11-08 DIAGNOSIS — I10 HYPERTENSION, UNSPECIFIED TYPE: ICD-10-CM

## 2018-11-08 DIAGNOSIS — E78.2 MIXED HYPERLIPIDEMIA: ICD-10-CM

## 2018-11-08 DIAGNOSIS — I10 ESSENTIAL HYPERTENSION, BENIGN: ICD-10-CM

## 2018-11-08 RX ORDER — FLUTICASONE PROPIONATE 50 MCG
SPRAY, SUSPENSION (ML) NASAL
Qty: 1 BOTTLE | Refills: 1 | Status: SHIPPED | OUTPATIENT
Start: 2018-11-08 | End: 2021-05-06

## 2018-11-08 RX ORDER — AMLODIPINE BESYLATE 5 MG/1
5 TABLET ORAL 2 TIMES DAILY
Qty: 180 TAB | Refills: 3 | Status: SHIPPED | OUTPATIENT
Start: 2018-11-08 | End: 2019-11-25 | Stop reason: SDUPTHER

## 2018-11-08 NOTE — PROGRESS NOTES
1. Have you been to the ER, urgent care clinic since your last visit? Hospitalized since your last visit? NO    2. Have you seen or consulted any other health care providers outside of the [address] [place] [place] [address] since your last visit? Include any pap smears or colon screening. NO  NO CARDIAC C/O.

## 2018-11-08 NOTE — PROGRESS NOTES
Perry Sweeney DNP, ANP-BC  Subjective/HPI:     Paulette Solorio is a 80 y.o. male is here for routine f/u Regarding hypertension management. At last visit amlodipine was titrated to 10 mg daily. The patient denies chest pain/ shortness of breath, orthopnea, PND, LE edema, palpitations, syncope, presyncope or fatigue. Patient also reporting persistent nasal congestion without mucus production, afebrile.       PCP Provider  Christopher Lambert MD  Past Medical History:   Diagnosis Date    Arthritis     SHOULDERS AND SPINE    CAD (coronary artery disease) 7/1998    MI CARDIAC CATH, STENT X 2    Cancer ([facility] 04/2008    chronic lymphocytic leukemia    Cancer ([facility]     multiple skin cancers     Chest pain, unspecified     Chronic pain     L 2 LYTIC LESION    Edema     Hypertension     Ill-defined condition     GLUCOMA    MI (myocardial infarction) ([facility]     Postsurgical percutaneous transluminal coronary angioplasty status 6/4/2012    Pre-operative cardiovascular examination     Thyroid disease     HYPO      Past Surgical History:   Procedure Laterality Date    CARDIAC SURG PROCEDURE UNLIST  7/1998    2 Cardiac stents    EGD  6/7/2010         HX ADENOIDECTOMY  1936    HX APPENDECTOMY  1954    HX CATARACT REMOVAL      bilateral    HX ORTHOPAEDIC  4/15/2008    Right hip replacement    HX OTHER SURGICAL  5/2011    Moh's procedure,     HX OTHER SURGICAL      MULTIPLES SKIN CANCER REMOVALS, MOSTLY ON HEAD    HX SKIN BIOPSY  04/27/2018    on scalp     HX TONSILLECTOMY  1936    1936     Allergies   Allergen Reactions    Statins-Hmg-Coa Reductase Inhibitors Myalgia and Other (comments)     Causes aching, generalized      Family History   Problem Relation Age of Onset    Diabetes Mother     Heart Disease Father     Heart Attack Father 80    Lung Disease Sister         PULMONARY FIBROSIS    Heart Attack Brother     No Known Problems Sister     Anesth Problems Neg Hx       Current Outpatient Medications   Medication Sig    amLODIPine (NORVASC) 10 mg tablet Take 1 Tab by mouth daily. Increases 9/28/18    lisinopril (PRINIVIL, ZESTRIL) 40 mg tablet Take 1 Tab by mouth daily.  LUMIGAN 0.01 % ophthalmic drops INSTILL 1 DROP INTO BOTH EYES NIGHTLY    isosorbide mononitrate (ISMO, MONOKET) 20 mg tablet Take 1 Tab by mouth two (2) times a day. (Patient taking differently: Take 10 mg by mouth two (2) times a day. PT STATES HE NEEDS MEDS EXACTLY AT 7 AM & 2 PM)    ezetimibe (ZETIA) 10 mg tablet TAKE ONE TABLET BY MOUTH AT NIGHT    nitroglycerin (NITROSTAT) 0.4 mg SL tablet 1 Tab by SubLINGual route every five (5) minutes as needed for Chest Pain (max 3 tabs daily for chest pain). Up to 3 doses.  aspirin delayed-release 81 mg tablet Take 81 mg by mouth nightly.  cholecalciferol (VITAMIN D3) 1,000 unit tablet Take 1,000 Units by mouth daily (with breakfast).  timolol (TIMOPTIC) 0.5 % ophthalmic solution Administer 1 Drop to both eyes two (2) times a day.  bimatoprost (LUMIGAN) 0.03 % ophthalmic drops Administer 1 Drop to both eyes nightly.  acetaminophen (TYLENOL EXTRA STRENGTH) 500 mg tablet Take  by mouth every six (6) hours as needed for Pain.  brinzolamide-brimonidine (SIMBRINZA) 1-0.2 % drps Apply 1 Drop to eye two (2) times a day.  finasteride (PROSCAR) 5 mg tablet Take 5 mg by mouth daily.  silodosin (RAPAFLO) 8 mg capsule Take 8 mg by mouth nightly.  levothyroxine (SYNTHROID) 25 mcg tablet Take 25 mcg by mouth Daily (before breakfast).  HYDROcodone-acetaminophen (NORCO) 7.5-325 mg per tablet Take 2 Tabs by mouth every six (6) hours as needed for Pain for up to 30 doses. Max Daily Amount: 8 Tabs. Indications: Pain     No current facility-administered medications for this visit.        Vitals:    11/08/18 0851   Weight: 176 lb 4.8 oz (80 kg)   Height: 5' 3\" (1.6 m)     Social History     Socioeconomic History    Marital status:      Spouse name: Not on file    Number of children: Not on file    Years of education: Not on file    Highest education level: Not on file   Social Needs    Financial resource strain: Not on file    Food insecurity - worry: Not on file    Food insecurity - inability: Not on file    Transportation needs - medical: Not on file   [employer] needs - non-medical: Not on file   Occupational History    Not on file   Tobacco Use    Smoking status: Former Smoker     Packs/day: 0.50     Years: 15.00     Pack years: 7.50     Last attempt to quit: 1974     Years since quittin.8    Smokeless tobacco: Never Used   Substance and Sexual Activity    Alcohol use: No    Drug use: No    Sexual activity: Not on file   Other Topics Concern    Not on file   Social History Narrative    Not on file       I have reviewed the nurses notes, vitals, problem list, allergy list, medical history, family, social history and medications. Review of Symptoms:    General: Pt denies excessive weight gain or loss. Pt is able to conduct ADL's  HEENT: Denies blurred vision, headaches, epistaxis and difficulty swallowing. Respiratory: Denies shortness of breath, THOMSON, wheezing or stridor. Cardiovascular: Denies precordial pain, palpitations, edema or PND  Gastrointestinal: Denies poor appetite, indigestion, abdominal pain or blood in stool  Musculoskeletal: Denies pain or swelling from muscles or joints  Neurologic: Denies tremor, paresthesias, or sensory motor disturbance  Skin: Denies rash, itching or texture change. Physical Exam:      General: Well developed, in no acute distress, cooperative and alert  HEENT: No carotid bruits, no JVD, trach is midline. Neck Supple, PEERL, EOM intact. Nasal turbinates swollen bilaterally, deviated septum bowing to the right nares  Heart:  Normal S1/S2 negative S3 or S4.  Regular, no murmur, gallop or rub.   Respiratory: Clear bilaterally x 4, no wheezing or rales  Abdomen:   Soft, non-tender, no masses, bowel sounds are active.   Extremities:  No edema, normal cap refill, no cyanosis, atraumatic. Neuro: A&Ox3, speech clear, gait stable with cane  Skin: Skin color is normal. No rashes or lesions. Non diaphoretic  Vascular: 2+ pulses symmetric in all extremities    Cardiographics    ECG:   Results for orders placed or performed during the hospital encounter of 08/21/15   EKG, 12 LEAD, INITIAL   Result Value Ref Range    Ventricular Rate 64 BPM    Atrial Rate 64 BPM    P-R Interval 182 ms    QRS Duration 86 ms    Q-T Interval 406 ms    QTC Calculation (Bezet) 418 ms    Calculated P Axis 69 degrees    Calculated R Axis 20 degrees    Calculated T Axis 47 degrees    Diagnosis       Sinus rhythm with occasional ventricular-paced complexes  Poor Anterior Forces    When compared with ECG of 27-MAR-2013 13:09,    Vent. rate has decreased BY  53 BPM  Confirmed by Teresa Chandler ([id number]) on 8/22/2015 3:43:32 PM           Cardiology Labs:  Lab Results   Component Value Date/Time    Cholesterol, total 150 08/23/2017 04:24 PM    HDL Cholesterol 39 (L) 08/23/2017 04:24 PM    LDL, calculated 87 08/23/2017 04:24 PM    Triglyceride 119 08/23/2017 04:24 PM       Lab Results   Component Value Date/Time    Sodium 140 06/15/2018 12:45 PM    Potassium 4.7 06/15/2018 12:45 PM    Chloride 104 06/15/2018 12:45 PM    CO2 27 06/15/2018 12:45 PM    Anion gap 9 06/15/2018 12:45 PM    Glucose 91 06/15/2018 12:45 PM    BUN 25 (H) 06/15/2018 12:45 PM    Creatinine 0.96 06/15/2018 12:45 PM    BUN/Creatinine ratio 26 (H) 06/15/2018 12:45 PM    GFR est AA >60 06/15/2018 12:45 PM    GFR est non-AA >60 06/15/2018 12:45 PM    Calcium 8.7 06/15/2018 12:45 PM    Bilirubin, total 0.4 06/15/2018 12:45 PM    AST (SGOT) 16 06/15/2018 12:45 PM    Alk.  phosphatase 74 06/15/2018 12:45 PM    Protein, total 5.8 (L) 06/15/2018 12:45 PM    Albumin 3.3 (L) 06/15/2018 12:45 PM    Globulin 2.5 06/15/2018 12:45 PM    A-G Ratio 1.3 06/15/2018 12:45 PM    ALT (SGPT) 17 06/15/2018 12:45 PM           Assessment:     Assessment:     Diagnoses and all orders for this visit:    1. ASHD (arteriosclerotic heart disease)    2. Hypertension, unspecified type    3. Essential hypertension, benign    4. Mixed hyperlipidemia        ICD-10-CM ICD-9-CM    1. ASHD (arteriosclerotic heart disease) I25.10 414.00    2. Hypertension, unspecified type I10 401.9    3. Essential hypertension, benign I10 401.1    4. Mixed hyperlipidemia E78.2 272.2      No orders of the defined types were placed in this encounter. Plan:     1. Atherosclerotic heart disease: Clinically stable asymptomatic, no ischemia on stress test June 2018  2. Hypertension: Controlled blood pressure 128/60. Continue current medications, patient prefers taking amlodipine 5 mg twice a day instead of a large 10 mg dose. 3.  Hyperlipidemia: Statin intolerance, LDL 87 with Zetia. Will continue current therapy as well as following low saturated fat diet  4. Swollen turbinates: Allergic rhinitis versus seasonal change. Recommend patient over-the-counter Flonase or generic equivalent 1 spray each nostril daily until symptoms improve not to exceed more than 2 months of continuous use. Kemal Shi NP    This note was created using voice recognition software. Despite editing, there may be syntax errors.

## 2018-11-12 RX ORDER — LISINOPRIL 40 MG/1
40 TABLET ORAL DAILY
Qty: 90 TAB | Refills: 1 | Status: SHIPPED | OUTPATIENT
Start: 2018-11-12 | End: 2019-03-29

## 2018-11-21 ENCOUNTER — TELEPHONE (OUTPATIENT)
Dept: CARDIOLOGY CLINIC | Age: 83
End: 2018-11-21

## 2018-11-21 RX ORDER — LISINOPRIL 20 MG/1
TABLET ORAL DAILY
COMMUNITY
End: 2018-12-14 | Stop reason: SDUPTHER

## 2018-11-21 NOTE — TELEPHONE ENCOUNTER
Spoke with patient  Verified patient with 2 patient identifiers    Patient concerned submitted refill for  Lisinopril 40 mg to pharmacy. Informed Lisinopril was increased at last BP check 9/28/2018. Pt states never increased medication. Has been taking Lisinopril 20 mg and says BP has been fine and would like to continue Lisinopril 20 mg every day.

## 2018-11-21 NOTE — TELEPHONE ENCOUNTER
Florence Brewster, NP   You 8 minutes ago (3:02 PM)      Arminda Quintero increased 9/28 at time of a blood pressure check, If he has remained on 20mg lisinopril and his BP is controlled as he keeps a close eye on it, please send RX for a 20mg tab 1 daily.        Pt notified, verbalized understanding  Pt states currently not in need of refill Lisinopril 20 mg

## 2018-11-21 NOTE — TELEPHONE ENCOUNTER
Patient states that a rx for lisinopril 4mg was sent to the pharmacy and he feels that it was a mistake. Please call to discuss further.     Thanks,    [employer]

## 2018-11-26 RX ORDER — EZETIMIBE 10 MG/1
TABLET ORAL
Qty: 90 TAB | Refills: 1 | Status: SHIPPED | OUTPATIENT
Start: 2018-11-26 | End: 2019-05-24 | Stop reason: SDUPTHER

## 2018-12-03 ENCOUNTER — HOSPITAL ENCOUNTER (OUTPATIENT)
Dept: CT IMAGING | Age: 83
Discharge: HOME | End: 2018-12-03
Attending: OTOLARYNGOLOGY
Payer: MEDICARE

## 2018-12-03 DIAGNOSIS — C44.42 SQUAMOUS CELL CARCINOMA OF SCALP: ICD-10-CM

## 2018-12-03 LAB — CREAT BLD-MCNC: 1 MG/DL (ref 0.6–1.3)

## 2018-12-03 PROCEDURE — 70470 CT HEAD/BRAIN W/O & W/DYE: CPT

## 2018-12-03 PROCEDURE — 74011250636 HC RX REV CODE- 250/636: Performed by: OTOLARYNGOLOGY

## 2018-12-03 PROCEDURE — 82565 ASSAY OF CREATININE: CPT

## 2018-12-03 PROCEDURE — 74011636320 HC RX REV CODE- 636/320: Performed by: OTOLARYNGOLOGY

## 2018-12-03 RX ORDER — SODIUM CHLORIDE 9 MG/ML
50 INJECTION, SOLUTION INTRAVENOUS
Status: COMPLETED | OUTPATIENT
Start: 2018-12-03 | End: 2018-12-03

## 2018-12-03 RX ORDER — SODIUM CHLORIDE 0.9 % (FLUSH) 0.9 %
10 SYRINGE (ML) INJECTION
Status: COMPLETED | OUTPATIENT
Start: 2018-12-03 | End: 2018-12-03

## 2018-12-03 RX ADMIN — IOPAMIDOL 100 ML: 755 INJECTION, SOLUTION INTRAVENOUS at 07:17

## 2018-12-03 RX ADMIN — Medication 10 ML: at 07:17

## 2018-12-03 RX ADMIN — SODIUM CHLORIDE 50 ML/HR: 900 INJECTION, SOLUTION INTRAVENOUS at 07:17

## 2018-12-14 RX ORDER — LISINOPRIL 20 MG/1
20 TABLET ORAL DAILY
Qty: 90 TAB | Refills: 1 | Status: SHIPPED | OUTPATIENT
Start: 2018-12-14 | End: 2019-06-02 | Stop reason: SDUPTHER

## 2019-02-08 ENCOUNTER — HOSPITAL ENCOUNTER (OUTPATIENT)
Dept: LAB | Age: 84
Discharge: HOME | End: 2019-02-08

## 2019-02-13 ENCOUNTER — HOSPITAL ENCOUNTER (OUTPATIENT)
Dept: CT IMAGING | Age: 84
Discharge: HOME | End: 2019-02-13
Attending: OTOLARYNGOLOGY
Payer: MEDICARE

## 2019-02-13 DIAGNOSIS — C44.42 SQUAMOUS CELL CARCINOMA, SCALP/NECK: ICD-10-CM

## 2019-02-13 LAB — CREAT BLD-MCNC: 0.9 MG/DL (ref 0.6–1.3)

## 2019-02-13 PROCEDURE — 74011636320 HC RX REV CODE- 636/320: Performed by: OTOLARYNGOLOGY

## 2019-02-13 PROCEDURE — 82565 ASSAY OF CREATININE: CPT

## 2019-02-13 PROCEDURE — 70470 CT HEAD/BRAIN W/O & W/DYE: CPT

## 2019-02-13 RX ORDER — SODIUM CHLORIDE 0.9 % (FLUSH) 0.9 %
10 SYRINGE (ML) INJECTION
Status: COMPLETED | OUTPATIENT
Start: 2019-02-13 | End: 2019-02-13

## 2019-02-13 RX ADMIN — IOPAMIDOL 100 ML: 755 INJECTION, SOLUTION INTRAVENOUS at 14:10

## 2019-02-13 RX ADMIN — Medication 10 ML: at 14:10

## 2019-02-25 DIAGNOSIS — C44.42 SCC (SQUAMOUS CELL CARCINOMA), SCALP/NECK: Primary | ICD-10-CM

## 2019-02-26 ENCOUNTER — HOSPITAL ENCOUNTER (OUTPATIENT)
Dept: MRI IMAGING | Age: 84
Discharge: HOME | End: 2019-02-26
Attending: OTOLARYNGOLOGY
Payer: MEDICARE

## 2019-02-26 DIAGNOSIS — C44.42 SQUAMOUS CELL CARCINOMA OF SCALP: ICD-10-CM

## 2019-02-26 PROCEDURE — 70553 MRI BRAIN STEM W/O & W/DYE: CPT

## 2019-02-26 PROCEDURE — A9575 INJ GADOTERATE MEGLUMI 0.1ML: HCPCS | Performed by: OTOLARYNGOLOGY

## 2019-02-26 PROCEDURE — 74011250636 HC RX REV CODE- 250/636: Performed by: OTOLARYNGOLOGY

## 2019-02-26 RX ORDER — GADOTERATE MEGLUMINE 376.9 MG/ML
15 INJECTION INTRAVENOUS
Status: COMPLETED | OUTPATIENT
Start: 2019-02-26 | End: 2019-02-26

## 2019-02-26 RX ADMIN — GADOTERATE MEGLUMINE 15 ML: 376.9 INJECTION INTRAVENOUS at 19:32

## 2019-03-29 ENCOUNTER — ANESTHESIA EVENT (OUTPATIENT)
Dept: SURGERY | Age: 84
End: 2019-03-29
Payer: MEDICARE

## 2019-04-01 ENCOUNTER — HOSPITAL ENCOUNTER (OUTPATIENT)
Age: 84
Setting detail: OUTPATIENT SURGERY
Discharge: HOME | End: 2019-04-01
Attending: OTOLARYNGOLOGY | Admitting: OTOLARYNGOLOGY
Payer: MEDICARE

## 2019-04-01 ENCOUNTER — ANESTHESIA (OUTPATIENT)
Dept: SURGERY | Age: 84
End: 2019-04-01
Payer: MEDICARE

## 2019-04-01 VITALS
DIASTOLIC BLOOD PRESSURE: 65 MMHG | HEART RATE: 84 BPM | RESPIRATION RATE: 17 BRPM | SYSTOLIC BLOOD PRESSURE: 122 MMHG | OXYGEN SATURATION: 96 % | HEIGHT: 62 IN | BODY MASS INDEX: 30.62 KG/M2 | WEIGHT: 166.38 LBS | TEMPERATURE: 97.5 F

## 2019-04-01 PROCEDURE — 74011250636 HC RX REV CODE- 250/636: Performed by: OTOLARYNGOLOGY

## 2019-04-01 PROCEDURE — 74011250636 HC RX REV CODE- 250/636

## 2019-04-01 PROCEDURE — 74011000272 HC RX REV CODE- 272: Performed by: OTOLARYNGOLOGY

## 2019-04-01 PROCEDURE — 77030002986 HC SUT PROL J&J -A: Performed by: OTOLARYNGOLOGY

## 2019-04-01 PROCEDURE — 76210000006 HC OR PH I REC 0.5 TO 1 HR: Performed by: OTOLARYNGOLOGY

## 2019-04-01 PROCEDURE — 77030011267 HC ELECTRD BLD COVD -A: Performed by: OTOLARYNGOLOGY

## 2019-04-01 PROCEDURE — 76060000033 HC ANESTHESIA 1 TO 1.5 HR: Performed by: OTOLARYNGOLOGY

## 2019-04-01 PROCEDURE — 77030020782 HC GWN BAIR PAWS FLX 3M -B

## 2019-04-01 PROCEDURE — 76210000021 HC REC RM PH II 0.5 TO 1 HR: Performed by: OTOLARYNGOLOGY

## 2019-04-01 PROCEDURE — 76010000161 HC OR TIME 1 TO 1.5 HR INTENSV-TIER 1: Performed by: OTOLARYNGOLOGY

## 2019-04-01 PROCEDURE — 77030031139 HC SUT VCRL2 J&J -A: Performed by: OTOLARYNGOLOGY

## 2019-04-01 PROCEDURE — 74011250637 HC RX REV CODE- 250/637: Performed by: OTOLARYNGOLOGY

## 2019-04-01 PROCEDURE — 74011000250 HC RX REV CODE- 250: Performed by: OTOLARYNGOLOGY

## 2019-04-01 PROCEDURE — 77030018836 HC SOL IRR NACL ICUM -A: Performed by: OTOLARYNGOLOGY

## 2019-04-01 PROCEDURE — 77030040361 HC SLV COMPR DVT MDII -B: Performed by: OTOLARYNGOLOGY

## 2019-04-01 PROCEDURE — 77030020754 HC CUF TRNQT 2BLA STRY -B: Performed by: OTOLARYNGOLOGY

## 2019-04-01 RX ORDER — PROPOFOL 10 MG/ML
INJECTION, EMULSION INTRAVENOUS
Status: DISCONTINUED | OUTPATIENT
Start: 2019-04-01 | End: 2019-04-01 | Stop reason: HOSPADM

## 2019-04-01 RX ORDER — DIPHENHYDRAMINE HYDROCHLORIDE 50 MG/ML
12.5 INJECTION, SOLUTION INTRAMUSCULAR; INTRAVENOUS AS NEEDED
Status: DISCONTINUED | OUTPATIENT
Start: 2019-04-01 | End: 2019-04-01 | Stop reason: HOSPADM

## 2019-04-01 RX ORDER — MORPHINE SULFATE 10 MG/ML
2 INJECTION, SOLUTION INTRAMUSCULAR; INTRAVENOUS
Status: DISCONTINUED | OUTPATIENT
Start: 2019-04-01 | End: 2019-04-01 | Stop reason: HOSPADM

## 2019-04-01 RX ORDER — MIDAZOLAM HYDROCHLORIDE 1 MG/ML
1 INJECTION, SOLUTION INTRAMUSCULAR; INTRAVENOUS
Status: DISCONTINUED | OUTPATIENT
Start: 2019-04-01 | End: 2019-04-01 | Stop reason: HOSPADM

## 2019-04-01 RX ORDER — SODIUM CHLORIDE, SODIUM LACTATE, POTASSIUM CHLORIDE, CALCIUM CHLORIDE 600; 310; 30; 20 MG/100ML; MG/100ML; MG/100ML; MG/100ML
125 INJECTION, SOLUTION INTRAVENOUS CONTINUOUS
Status: DISCONTINUED | OUTPATIENT
Start: 2019-04-01 | End: 2019-04-01 | Stop reason: HOSPADM

## 2019-04-01 RX ORDER — BACITRACIN 500 [USP'U]/G
OINTMENT TOPICAL AS NEEDED
Status: DISCONTINUED | OUTPATIENT
Start: 2019-04-01 | End: 2019-04-01 | Stop reason: HOSPADM

## 2019-04-01 RX ORDER — SODIUM CHLORIDE, SODIUM LACTATE, POTASSIUM CHLORIDE, CALCIUM CHLORIDE 600; 310; 30; 20 MG/100ML; MG/100ML; MG/100ML; MG/100ML
INJECTION, SOLUTION INTRAVENOUS
Status: DISCONTINUED | OUTPATIENT
Start: 2019-04-01 | End: 2019-04-01 | Stop reason: HOSPADM

## 2019-04-01 RX ORDER — DEXTROSE, SODIUM CHLORIDE, SODIUM LACTATE, POTASSIUM CHLORIDE, AND CALCIUM CHLORIDE 5; .6; .31; .03; .02 G/100ML; G/100ML; G/100ML; G/100ML; G/100ML
100 INJECTION, SOLUTION INTRAVENOUS CONTINUOUS
Status: DISCONTINUED | OUTPATIENT
Start: 2019-04-01 | End: 2019-04-01 | Stop reason: HOSPADM

## 2019-04-01 RX ORDER — CEFAZOLIN SODIUM/WATER 2 G/20 ML
2 SYRINGE (ML) INTRAVENOUS ONCE
Status: COMPLETED | OUTPATIENT
Start: 2019-04-01 | End: 2019-04-01

## 2019-04-01 RX ORDER — DEXMEDETOMIDINE HYDROCHLORIDE 4 UG/ML
INJECTION, SOLUTION INTRAVENOUS AS NEEDED
Status: DISCONTINUED | OUTPATIENT
Start: 2019-04-01 | End: 2019-04-01 | Stop reason: HOSPADM

## 2019-04-01 RX ORDER — OXYCODONE HYDROCHLORIDE 5 MG/1
5 TABLET ORAL AS NEEDED
Status: DISCONTINUED | OUTPATIENT
Start: 2019-04-01 | End: 2019-04-01 | Stop reason: HOSPADM

## 2019-04-01 RX ORDER — LIDOCAINE HYDROCHLORIDE AND EPINEPHRINE 10; 10 MG/ML; UG/ML
INJECTION, SOLUTION INFILTRATION; PERINEURAL AS NEEDED
Status: DISCONTINUED | OUTPATIENT
Start: 2019-04-01 | End: 2019-04-01 | Stop reason: HOSPADM

## 2019-04-01 RX ORDER — SODIUM CHLORIDE 0.9 % (FLUSH) 0.9 %
5-40 SYRINGE (ML) INJECTION AS NEEDED
Status: DISCONTINUED | OUTPATIENT
Start: 2019-04-01 | End: 2019-04-01 | Stop reason: HOSPADM

## 2019-04-01 RX ORDER — ONDANSETRON 2 MG/ML
4 INJECTION INTRAMUSCULAR; INTRAVENOUS AS NEEDED
Status: DISCONTINUED | OUTPATIENT
Start: 2019-04-01 | End: 2019-04-01 | Stop reason: HOSPADM

## 2019-04-01 RX ORDER — FENTANYL CITRATE 50 UG/ML
50 INJECTION, SOLUTION INTRAMUSCULAR; INTRAVENOUS AS NEEDED
Status: DISCONTINUED | OUTPATIENT
Start: 2019-04-01 | End: 2019-04-01 | Stop reason: HOSPADM

## 2019-04-01 RX ORDER — EPHEDRINE SULFATE 50 MG/ML
INJECTION, SOLUTION INTRAVENOUS AS NEEDED
Status: DISCONTINUED | OUTPATIENT
Start: 2019-04-01 | End: 2019-04-01 | Stop reason: HOSPADM

## 2019-04-01 RX ORDER — MIDAZOLAM HYDROCHLORIDE 1 MG/ML
1 INJECTION, SOLUTION INTRAMUSCULAR; INTRAVENOUS AS NEEDED
Status: DISCONTINUED | OUTPATIENT
Start: 2019-04-01 | End: 2019-04-01 | Stop reason: HOSPADM

## 2019-04-01 RX ORDER — MINERAL OIL
OIL (ML) MISCELLANEOUS AS NEEDED
Status: DISCONTINUED | OUTPATIENT
Start: 2019-04-01 | End: 2019-04-01 | Stop reason: HOSPADM

## 2019-04-01 RX ORDER — FENTANYL CITRATE 50 UG/ML
25 INJECTION, SOLUTION INTRAMUSCULAR; INTRAVENOUS
Status: DISCONTINUED | OUTPATIENT
Start: 2019-04-01 | End: 2019-04-01 | Stop reason: HOSPADM

## 2019-04-01 RX ORDER — FENTANYL CITRATE 50 UG/ML
INJECTION, SOLUTION INTRAMUSCULAR; INTRAVENOUS AS NEEDED
Status: DISCONTINUED | OUTPATIENT
Start: 2019-04-01 | End: 2019-04-01 | Stop reason: HOSPADM

## 2019-04-01 RX ORDER — ONDANSETRON 2 MG/ML
INJECTION INTRAMUSCULAR; INTRAVENOUS AS NEEDED
Status: DISCONTINUED | OUTPATIENT
Start: 2019-04-01 | End: 2019-04-01 | Stop reason: HOSPADM

## 2019-04-01 RX ORDER — SODIUM CHLORIDE 0.9 % (FLUSH) 0.9 %
5-40 SYRINGE (ML) INJECTION EVERY 8 HOURS
Status: DISCONTINUED | OUTPATIENT
Start: 2019-04-01 | End: 2019-04-01 | Stop reason: HOSPADM

## 2019-04-01 RX ORDER — LIDOCAINE HYDROCHLORIDE 20 MG/ML
INJECTION, SOLUTION EPIDURAL; INFILTRATION; INTRACAUDAL; PERINEURAL AS NEEDED
Status: DISCONTINUED | OUTPATIENT
Start: 2019-04-01 | End: 2019-04-01 | Stop reason: HOSPADM

## 2019-04-01 RX ORDER — LIDOCAINE HYDROCHLORIDE 10 MG/ML
0.5 INJECTION, SOLUTION EPIDURAL; INFILTRATION; INTRACAUDAL; PERINEURAL AS NEEDED
Status: DISCONTINUED | OUTPATIENT
Start: 2019-04-01 | End: 2019-04-01 | Stop reason: HOSPADM

## 2019-04-01 RX ORDER — DEXTROSE, SODIUM CHLORIDE, SODIUM LACTATE, POTASSIUM CHLORIDE, AND CALCIUM CHLORIDE 5; .6; .31; .03; .02 G/100ML; G/100ML; G/100ML; G/100ML; G/100ML
125 INJECTION, SOLUTION INTRAVENOUS CONTINUOUS
Status: DISCONTINUED | OUTPATIENT
Start: 2019-04-01 | End: 2019-04-01 | Stop reason: HOSPADM

## 2019-04-01 RX ADMIN — FENTANYL CITRATE 25 MCG: 50 INJECTION, SOLUTION INTRAMUSCULAR; INTRAVENOUS at 10:07

## 2019-04-01 RX ADMIN — FENTANYL CITRATE 25 MCG: 50 INJECTION, SOLUTION INTRAMUSCULAR; INTRAVENOUS at 09:52

## 2019-04-01 RX ADMIN — Medication 2 G: at 09:53

## 2019-04-01 RX ADMIN — DEXMEDETOMIDINE HYDROCHLORIDE 10 MCG: 4 INJECTION, SOLUTION INTRAVENOUS at 09:50

## 2019-04-01 RX ADMIN — EPHEDRINE SULFATE 5 MG: 50 INJECTION, SOLUTION INTRAVENOUS at 09:57

## 2019-04-01 RX ADMIN — ONDANSETRON 4 MG: 2 INJECTION INTRAMUSCULAR; INTRAVENOUS at 09:56

## 2019-04-01 RX ADMIN — EPHEDRINE SULFATE 5 MG: 50 INJECTION, SOLUTION INTRAVENOUS at 10:10

## 2019-04-01 RX ADMIN — SODIUM CHLORIDE, SODIUM LACTATE, POTASSIUM CHLORIDE, CALCIUM CHLORIDE: 600; 310; 30; 20 INJECTION, SOLUTION INTRAVENOUS at 09:50

## 2019-04-01 RX ADMIN — FENTANYL CITRATE 25 MCG: 50 INJECTION, SOLUTION INTRAMUSCULAR; INTRAVENOUS at 10:25

## 2019-04-01 RX ADMIN — FENTANYL CITRATE 25 MCG: 50 INJECTION, SOLUTION INTRAMUSCULAR; INTRAVENOUS at 10:31

## 2019-04-01 RX ADMIN — LIDOCAINE HYDROCHLORIDE 40 MG: 20 INJECTION, SOLUTION EPIDURAL; INFILTRATION; INTRACAUDAL; PERINEURAL at 09:51

## 2019-04-01 RX ADMIN — PROPOFOL 100 MCG/KG/MIN: 10 INJECTION, EMULSION INTRAVENOUS at 09:50

## 2019-04-01 NOTE — ANESTHESIA POSTPROCEDURE EVALUATION
Procedure(s): CRANIECTOMY WITH APPLICATION OF SPLIT THICKNESS SKIN GRAFT FROM RIGHT THIGH; SCALP RECONSTRUCTION. MAC Anesthesia Post Evaluation Patient location during evaluation: PACU Patient participation: complete - patient participated Level of consciousness: awake and alert Pain management: adequate Airway patency: patent Anesthetic complications: no 
Cardiovascular status: acceptable Respiratory status: acceptable Hydration status: acceptable Comments: I have seen and evaluated the patient and is ready for discharge. Aida Diaz MD 
 
Post anesthesia nausea and vomiting:  none Vitals Value Taken Time /74 4/1/2019 11:45 AM  
Temp 36.4 °C (97.5 °F) 4/1/2019 11:30 AM  
Pulse 76 4/1/2019 11:47 AM  
Resp 15 4/1/2019 11:47 AM  
SpO2 93 % 4/1/2019 11:47 AM  
Vitals shown include unvalidated device data.

## 2019-04-01 NOTE — ROUTINE PROCESS
Patient: Lashonda Donaldson MRN: [id number]  SSN: [id number] YOB: 1929  Age: [address]19 [address] y.o. Sex: male Patient is status post Procedure(s): CRANIECTOMY WITH APPLICATION OF SPLIT THICKNESS SKIN GRAFT FROM RIGHT THIGH; SCALP RECONSTRUCTION. Surgeon(s) and Role: Akanksha Carias MD - Primary Local/Dose/Irrigation:  13 ML of local (lidocaine 1% with epi 1:100,000) given by Dr. Miguel Martin to both surgical sites ( right thigh and scalp). Peripheral IV 04/01/19 Left Forearm (Active) Dressing/Packing:   Xeroform, cotton balls, spandage to scalp.  
Splint/Cast:

## 2019-04-01 NOTE — DISCHARGE INSTRUCTIONS
Patient Education     Wound Care: After Your Visit  Your Care Instructions  Taking good care of your wound at home will help it heal quickly and reduce your chance of infection. The doctor has checked you carefully, but problems can develop later. If you notice any problems or new symptoms, get medical treatment right away. Follow-up care is a key part of your treatment and safety. Be sure to make and go to all appointments, and call your doctor if you are having problems. It's also a good idea to know your test results and keep a list of the medicines you take. How can you care for yourself at home? · \"Pillow\", bolster dressing sewn in place for 10-14 days. ¨ Replace the bandage as needed. · Take pain medicines exactly as directed. Some pain is normal with a wound, but do not ignore pain that is getting worse instead of better. You could have an infection. ¨ If the doctor gave you a prescription medicine for pain, take it as prescribed. ¨ If you are not taking a prescription pain medicine, ask your doctor if you can take an over-the-counter medicine. · Your doctor may have closed your wound with stitches (sutures), staples, or skin glue. ¨ If you have stitches, your doctor may remove them after several days to 2 weeks. Or you may have stitches that dissolve on their own. ¨ If you have staples, your doctor may remove them after 7 to 10 days. ¨ If your wound was closed with skin glue, the glue will wear off in a few days to 2 weeks. When should you call for help? Call your doctor now or seek immediate medical care if:  · You have signs of infection, such as:  ¨ Increased pain, swelling, warmth, or redness near the wound. ¨ Red streaks leading from the wound. ¨ Pus draining from the wound. ¨ A fever. · You bleed so much from your incision that you soak one or more bandages over 2 to 4 hours.   Watch closely for changes in your health, and be sure to contact your doctor if:  · The wound is not getting better each day. Where can you learn more? Go to [employer]  Enter M973 in the search box to learn more about \"Wound Care: After Your Visit. \"   © 6801-6950 [employer]. Care instructions adapted under license by [facility] (which disclaims liability or warranty for this information). This care instruction is for use with your licensed healthcare professional. If you have questions about a medical condition or this instruction, always ask your healthcare professional. [address] any warranty or liability for your use of this information. Content Version: 78.3.028373; Last Revised: April 23, 2012               ______________________________________________________________________    Anesthesia Discharge Instructions    After general anesthesia or intervenous sedation, for 24 hours or while taking prescription Narcotics:  · Limit your activities  · Do not drive or operate hazardous machinery  · If you have not urinated within 8 hours after discharge, please contact your surgeon on call. · Do not make important personal or business decisions  · Do not drink alcoholic beverages    Report the following to your surgeon:  · Excessive pain, swelling, redness or odor of or around the surgical area  · Temperature over 100.5 degrees  · Nausea and vomiting lasting longer than 4 hours or if unable to take medication  · Any signs of decreased circulation or nerve impairment to extremity:  Change in color, persistent numbness, tingling, coldness or increased pain.   · Any questions

## 2019-04-02 NOTE — OP NOTES
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 PROCEDURE PERFORMED:  1. Split-thickness skin graft reconstruction of the scalp: 10 x 12 cm. 2.  Debridement in preparation for reconstruction. SURGEON:  Nava Huston MD    ANESTHESIA:  Conscious sedation anesthesia. ASSISTANT:  None. IMPLANTS:  None. COMPLICATIONS:  None. ESTIMATED BLOOD LOSS:  10 mL    SPECIMENS REMOVED:  none    INDICATIONS AND FINDINGS:  The patient had a full-thickness soft tissue scalp and calvarial resection for squamous cell carcinoma with interval Integra graft to encourage granulation tissue, now presenting for split-thickness grafting. PROCEDURE:  In the operating room, the patient was induced under conscious sedation anesthesia, following which she was prepped and draped in a sterile fashion. 1% lidocaine with 1:100,000 part epinephrine was used to inject the scalp soft tissue and the right upper lateral leg, the patient's request as donor site. The scalp was first debrided with bacitracin saline, removing exudate and debris and scrubbing the entire scalp clean, leaving healthy bleeding granulation tissue. Carla dermatome set at 18 microns was then used to remove a 10 x 12 split-thickness skin graft from the thigh. The donor site was cleaned and a compression dressing applied, retained by Tegaderm. The split-thickness graft was then applied to the scalp and closed with running locked 4-0 chromic gut suture circumferentially. A bolster of Xeroform and cottonoids then placed, secured with mattress of 2-0 Prolene, and the patient handed over to Anesthesia for awakening and transfer to the recovery room.         Campbell Burden MD [id number]  D:  04/01/2019 12:54  T:  04/01/2019 14:49  JOB #:  [id number]  CC:  [facility]

## 2019-05-08 ENCOUNTER — OFFICE VISIT (OUTPATIENT)
Dept: CARDIOLOGY CLINIC | Age: 84
End: 2019-05-08

## 2019-05-08 VITALS
HEART RATE: 56 BPM | SYSTOLIC BLOOD PRESSURE: 108 MMHG | BODY MASS INDEX: 32.55 KG/M2 | OXYGEN SATURATION: 97 % | HEIGHT: 62 IN | DIASTOLIC BLOOD PRESSURE: 52 MMHG | WEIGHT: 176.9 LBS | RESPIRATION RATE: 16 BRPM

## 2019-05-08 DIAGNOSIS — I10 ESSENTIAL HYPERTENSION, BENIGN: ICD-10-CM

## 2019-05-08 DIAGNOSIS — E78.00 HIGH CHOLESTEROL: ICD-10-CM

## 2019-05-08 DIAGNOSIS — I25.10 ATHEROSCLEROSIS OF NATIVE CORONARY ARTERY OF NATIVE HEART WITHOUT ANGINA PECTORIS: Primary | ICD-10-CM

## 2019-05-08 RX ORDER — ASPIRIN 81 MG/1
81 TABLET ORAL DAILY
COMMUNITY

## 2019-05-08 NOTE — PROGRESS NOTES
Chief Complaint Patient presents with  Hypertension 6 month follow up 1. Have you been to the ER, urgent care clinic since your last visit? Hospitalized since your last visit? Yes When: 4/1/2019 [address] [place] [address] 2. Have you seen or consulted any other health care providers outside of the [address] [place] [address] since your last visit? Include any pap smears or colon screening.  Yes When: Dr Isreal Brown and PCP

## 2019-05-08 NOTE — PROGRESS NOTES
Yina Number DNP, ANP-BC Subjective/HPI:  
 
Crosby Hodgkins is a 80 y.o. male is here for routine f/u. The patient denies chest pain/ shortness of breath, orthopnea, PND, LE edema, palpitations, syncope, presyncope or fatigue. She reports feeling well in his usual state of health, states recent physical exam by primary care was all good checkup. Otology following multiple skin lesions PCP Provider Shirin Cali MD 
Past Medical History:  
Diagnosis Date  Arthritis [address] [facility]  CAD (coronary artery disease) 7/1998 MI CARDIAC CATH, STENT X 2  
 Cancer ([facility] 04/2008  
 chronic lymphocytic leukemia  Cancer ([facility] [address] multiple skin cancers  Chest pain, unspecified  Chronic pain L 2 LYTIC LESION  
 Edema  Hypertension  Ill-defined condition GLUCOMA  MI (myocardial infarction) ([facility] [address] [facility]  Postsurgical percutaneous transluminal coronary angioplasty status 6/4/2012  Pre-operative cardiovascular examination  Thyroid disease HYPO Past Surgical History:  
Procedure Laterality Date  CARDIAC SURG PROCEDURE UNLIST  7/1998 2 Cardiac stents  EGD  6/7/2010  HX ADENOIDECTOMY  [id number] [address]  HX CATARACT REMOVAL    
 bilateral  
 HX ORTHOPAEDIC  4/15/2008 Right hip replacement  HX OTHER SURGICAL  5/2011 Moh's procedure,   
 HX OTHER SURGICAL MULTIPLES SKIN CANCER REMOVALS, MOSTLY ON HEAD  
 HX SKIN BIOPSY  04/27/2018 on scalp  HX TONSILLECTOMY  [id number] Allergies Allergen Reactions  Statins-Hmg-Coa Reductase Inhibitors Myalgia and Other (comments) Causes aching, generalized Family History Problem Relation Age of Onset  Diabetes Mother  Heart Disease Father  Heart Attack Father 80  Lung Disease Sister PULMONARY FIBROSIS  
 Heart Attack Brother  No Known Problems Sister  Anesth Problems Neg Hx Current Outpatient Medications Medication Sig  
 aspirin delayed-release 81 mg tablet Take  by mouth daily.  lisinopril (PRINIVIL, ZESTRIL) 20 mg tablet Take 1 Tab by mouth daily.  ezetimibe (ZETIA) 10 mg tablet TAKE ONE TABLET BY MOUTH AT NIGHT  fluticasone (FLONASE) 50 mcg/actuation nasal spray 1 spray each nostril daily (Patient taking differently: 1 Spray by Both Nostrils route as needed. 1 spray each nostril daily)  amLODIPine (NORVASC) 5 mg tablet Take 1 Tab by mouth two (2) times a day. Increases 9/28/18  LUMIGAN 0.01 % ophthalmic drops INSTILL 1 DROP INTO BOTH EYES NIGHTLY  isosorbide mononitrate (ISMO, MONOKET) 20 mg tablet Take 1 Tab by mouth two (2) times a day. (Patient taking differently: Take 20 mg by mouth two (2) times a day. PT STATES HE NEEDS MEDS EXACTLY AT 7 AM & 2 PM)  nitroglycerin (NITROSTAT) 0.4 mg SL tablet 1 Tab by SubLINGual route every five (5) minutes as needed for Chest Pain (max 3 tabs daily for chest pain). Up to 3 doses.  cholecalciferol (VITAMIN D3) 1,000 unit tablet Take 5,000 Units by mouth daily (with breakfast).  timolol (TIMOPTIC) 0.5 % ophthalmic solution Administer 1 Drop to both eyes two (2) times a day.  bimatoprost (LUMIGAN) 0.03 % ophthalmic drops Administer 1 Drop to both eyes nightly.  acetaminophen (TYLENOL EXTRA STRENGTH) 500 mg tablet Take  by mouth every six (6) hours as needed for Pain.  brinzolamide-brimonidine (SIMBRINZA) 1-0.2 % drps Administer 1 Drop to both eyes two (2) times a day.  finasteride (PROSCAR) 5 mg tablet Take 5 mg by mouth daily.  silodosin (RAPAFLO) 8 mg capsule Take 8 mg by mouth daily (with breakfast).  levothyroxine (SYNTHROID) 25 mcg tablet Take 25 mcg by mouth Daily (before breakfast).  HYDROcodone-acetaminophen (NORCO) 7.5-325 mg per tablet Take 2 Tabs by mouth every six (6) hours as needed for Pain for up to 30 doses. Max Daily Amount: 8 Tabs. Indications: Pain No current facility-administered medications for this visit. Vitals:  
 19 2514 19 9981 BP: 110/60 108/52 Pulse: (!) 56 Resp: 16 SpO2: 97% Weight: 176 lb 14.4 oz (80.2 kg) Height: 5' 2\" (1.575 m) Social History Socioeconomic History  Marital status:  Spouse name: Not on file  Number of children: Not on file  Years of education: Not on file  Highest education level: Not on file Occupational History  Not on file Social Needs  Financial resource strain: Not on file  Food insecurity:  
  Worry: Not on file Inability: Not on file  Transportation needs:  
  Medical: Not on file Non-medical: Not on file Tobacco Use  Smoking status: Former Smoker Packs/day: 0.50 Years: 15.00 Pack years: 7.50 Last attempt to quit: 1974 Years since quittin.3  Smokeless tobacco: Never Used Substance and Sexual Activity  Alcohol use: No  
 Drug use: No  
 Sexual activity: Not on file Lifestyle  Physical activity:  
  Days per week: Not on file Minutes per session: Not on file  Stress: Not on file Relationships  Social connections:  
  Talks on phone: Not on file Gets together: Not on file Attends Muslim service: Not on file Active member of club or organization: Not on file Attends meetings of clubs or organizations: Not on file Relationship status: Not on file  Intimate partner violence:  
  Fear of current or ex partner: Not on file Emotionally abused: Not on file Physically abused: Not on file Forced sexual activity: Not on file Other Topics Concern  Not on file Social History Narrative  Not on file I have reviewed the nurses notes, vitals, problem list, allergy list, medical history, family, social history and medications. Review of Symptoms: 
 
General: Pt denies excessive weight gain or loss.  Pt is able to conduct ADL's 
 HEENT: Denies blurred vision, headaches, epistaxis and difficulty swallowing. Respiratory: Denies shortness of breath, THOMSON, wheezing or stridor. Cardiovascular: Denies precordial pain, palpitations, edema or PND Gastrointestinal: Denies poor appetite, indigestion, abdominal pain or blood in stool Musculoskeletal: Denies pain or swelling from muscles or joints Neurologic: Denies tremor, paresthesias, or sensory motor disturbance Skin: Multiple skin lesions followed by dermatology Physical Exam:   
 
General: Well developed, in no acute distress, cooperative and alert HEENT: No carotid bruits, no JVD, trach is midline. Neck Supple, PEERL, EOM intact. Heart:  Normal S1/S2 negative S3 or S4. Regular, no murmur, gallop or rub.  
Respiratory: Clear bilaterally x 4, no wheezing or rales Abdomen:   Soft, non-tender, no masses, bowel sounds are active.  
Extremities:  No edema, normal cap refill, no cyanosis, atraumatic. Neuro: A&Ox3, speech clear, gait stable. Skin: Skin color is normal.  Patient has several skin lesions on his scalp bilateral forearms and face. Vascular: 2+ pulses symmetric in all extremities Cardiographics ECG: Sinus rhythm Results for orders placed or performed during the hospital encounter of 08/21/15 EKG, 12 LEAD, INITIAL Result Value Ref Range Ventricular Rate 64 BPM  
 Atrial Rate 64 BPM  
 P-R Interval 182 ms QRS Duration 86 ms  
 Q-T Interval 406 ms QTC Calculation (Bezet) 418 ms Calculated P Axis 69 degrees Calculated R Axis 20 degrees Calculated T Axis 47 degrees Diagnosis Sinus rhythm with occasional ventricular-paced complexes Poor Anterior Forces When compared with ECG of 27-MAR-2013 13:09, 
 
Vent. rate has decreased BY  53 BPM 
Confirmed by Viralizedoris Brain ([id number]) on 8/22/2015 3:43:32 PM 
  
 
 
 
Cardiology Labs: 
Lab Results Component Value Date/Time  Cholesterol, total 150 08/23/2017 04:24 PM  
 HDL Cholesterol 39 (L) 08/23/2017 04:24 PM  
 LDL, calculated 87 08/23/2017 04:24 PM  
 Triglyceride 119 08/23/2017 04:24 PM  
 
 
Lab Results Component Value Date/Time Sodium 140 06/15/2018 12:45 PM  
 Potassium 4.7 06/15/2018 12:45 PM  
 Chloride 104 06/15/2018 12:45 PM  
 CO2 27 06/15/2018 12:45 PM  
 Anion gap 9 06/15/2018 12:45 PM  
 Glucose 91 06/15/2018 12:45 PM  
 BUN 25 (H) 06/15/2018 12:45 PM  
 Creatinine 0.96 06/15/2018 12:45 PM  
 BUN/Creatinine ratio 26 (H) 06/15/2018 12:45 PM  
 GFR est AA >60 06/15/2018 12:45 PM  
 GFR est non-AA >60 06/15/2018 12:45 PM  
 Calcium 8.7 06/15/2018 12:45 PM  
 Bilirubin, total 0.4 06/15/2018 12:45 PM  
 AST (SGOT) 16 06/15/2018 12:45 PM  
 Alk. phosphatase 74 06/15/2018 12:45 PM  
 Protein, total 5.8 (L) 06/15/2018 12:45 PM  
 Albumin 3.3 (L) 06/15/2018 12:45 PM  
 Globulin 2.5 06/15/2018 12:45 PM  
 A-G Ratio 1.3 06/15/2018 12:45 PM  
 ALT (SGPT) 17 06/15/2018 12:45 PM  
  
 
 
 Assessment: 
 
 Assessment:  
 
Diagnoses and all orders for this visit: 1. Atherosclerosis of native coronary artery of native heart without angina pectoris 2. Essential hypertension, benign -     AMB POC EKG ROUTINE W/ 12 LEADS, INTER & REP 3. High cholesterol ICD-10-CM ICD-9-CM 1. Atherosclerosis of native coronary artery of native heart without angina pectoris I25.10 414.01   
2. Essential hypertension, benign I10 401.1 AMB POC EKG ROUTINE W/ 12 LEADS, INTER & REP 3. High cholesterol E78.00 272.0 Orders Placed This Encounter  AMB POC EKG ROUTINE W/ 12 LEADS, INTER & REP Order Specific Question:   Reason for Exam: Answer:   routine  aspirin delayed-release 81 mg tablet Sig: Take  by mouth daily. Plan: 1. Atherosclerotic heart disease: Remote history of PTCA stenting clinically stable asymptomatic no ischemia on nuclear stress test June 2018. 2.  Hypertension: Well controlled 108/52 3.  Hyperlipidemia: Statin intolerant has done well with Zetia Follow-up in 6 months Tete Bishop MD 
 
This note was created using voice recognition software. Despite editing, there may be syntax errors.

## 2019-05-24 RX ORDER — EZETIMIBE 10 MG/1
TABLET ORAL
Qty: 90 TAB | Refills: 1 | Status: SHIPPED | OUTPATIENT
Start: 2019-05-24 | End: 2019-11-19 | Stop reason: SDUPTHER

## 2019-06-03 RX ORDER — LISINOPRIL 20 MG/1
TABLET ORAL
Qty: 90 TAB | Refills: 1 | Status: SHIPPED | OUTPATIENT
Start: 2019-06-03 | End: 2019-12-10 | Stop reason: SDUPTHER

## 2019-08-05 ENCOUNTER — TELEPHONE (OUTPATIENT)
Dept: CARDIOLOGY CLINIC | Age: 84
End: 2019-08-05

## 2019-08-06 RX ORDER — ISOSORBIDE MONONITRATE 20 MG/1
20 TABLET ORAL 2 TIMES DAILY
Qty: 60 TAB | Refills: 6 | Status: CANCELLED | OUTPATIENT
Start: 2019-08-06

## 2019-08-06 RX ORDER — ISOSORBIDE MONONITRATE 20 MG/1
20 TABLET ORAL 2 TIMES DAILY
Qty: 180 TAB | Refills: 3 | Status: SHIPPED | OUTPATIENT
Start: 2019-08-06 | End: 2020-03-17 | Stop reason: SDUPTHER

## 2019-08-06 NOTE — TELEPHONE ENCOUNTER
[facility] called for refill on isosorbide mononitrate (ISMO, MONOKET) 20 mg tablet    I see where another request was made but [facility] refused the medication, the pharmacy called back again to fill it, please send to [facility] pharmacy [facility] in [place]    thanks

## 2019-11-05 ENCOUNTER — HOSPITAL ENCOUNTER (OUTPATIENT)
Dept: WOUND CARE | Age: 84
Discharge: HOME | End: 2019-11-05
Payer: MEDICARE

## 2019-11-05 VITALS
RESPIRATION RATE: 18 BRPM | SYSTOLIC BLOOD PRESSURE: 123 MMHG | TEMPERATURE: 96.7 F | DIASTOLIC BLOOD PRESSURE: 61 MMHG | HEART RATE: 79 BPM

## 2019-11-05 PROCEDURE — 74011250637 HC RX REV CODE- 250/637: Performed by: PLASTIC SURGERY

## 2019-11-05 PROCEDURE — 74011000250 HC RX REV CODE- 250: Performed by: PLASTIC SURGERY

## 2019-11-05 PROCEDURE — 99213 OFFICE O/P EST LOW 20 MIN: CPT

## 2019-11-05 RX ORDER — MUPIROCIN 20 MG/G
OINTMENT TOPICAL AS NEEDED
Status: DISCONTINUED | OUTPATIENT
Start: 2019-11-05 | End: 2019-11-08 | Stop reason: HOSPADM

## 2019-11-05 RX ADMIN — MUPIROCIN: 20 OINTMENT TOPICAL at 09:45

## 2019-11-05 RX ADMIN — Medication: at 09:14

## 2019-11-05 NOTE — WOUND CARE
Clinic Level of Care Assessment NAME:  Henok Dumont 
YOB: 1929 GENDER: male MEDICAL RECORD NUMBER:  [id number] DATE:  11/5/2019 Wound Count Document in Banner Number of Wounds Assessed Points No Wounds/Ulcers []   0 Less than Three Wounds/Ulcers [x]   1  
3-6 Wounds/Ulcers []   2 Greater than 6 Wounds/Ulcers []   3 Ambulation Status Document in Coord/ANA/Mobility tab Status Definition Points Independent Independently able to ambulate. Fully able (without any assistance) to get on/off exam table/chair. []   0 Minimal Physical Assistance Requires physical assistance of one person to ambulate and/or position patient to be examined. Includes necessary physical assistance to position lower extremities on/off stool. [x]   1 Moderate Physical Assistance Requires at least one staff member to physically assist patient in ambulating into treatment room, and on/off exam table. []   2 Full Assistance Requires assistance of at least two staff members to transfer patient into treatment room and/or on/off exam table/chair. \"Total Transfer\". []   3 Dressing Complexity Document in Banner and Write Appropriate Order Complexity Definition Points No Dressing  []   0 Simple Minimal, simple dressing. i.e. Band-aid, gauze, simple wrap. []   1 Intermediate Moderately complicated requiring licensed personnel to apply i.e. collagen matrix, ointments, gels, alginates. [x]   2 Complex Complicated requiring licensed personnel to apply dressings 6 or more wounds. []   3 Teaching Effort Document in Education Tab Effort Definition Points No Teaching  []   0 Simple Reinforce two or less topics. Document in Education navigator. [x]   1 Intermediate Reinforce three to five topics and/or one additional  
new topic. Document in Education navigator. []   2 Complex Teach more than one new topic. New patient information packet reviewed and/or reinforce more than three topics. Document in Education navigator. HBO initial instruction. []   3 Patient Assessment and Planning Planning Definition Points Simple Multiple System Simple: Simple follow-up with routine assessment and planning. If Discharged, instructions and long term/follow-up care given to patient/caregiver. Discharged, instructions and/or After Visit Summary given to patient/caregiver and instructions completed. [x]   1 Intermediate Multiple System Intermediate: Contact with outside resources; i.e. Telephone calls to home health, [facility]. May include filling out forms and writing letters, arranging transportation, communication with insurance , vendors, etc.  Discharged, instructions and/or After Visit Summary given to patient/caregiver and instructions completed. []   2 Complex Multiple System Complex: Full, comprehensive assessment and planning. Follow the entire navigator under Wound Visit charting filling out each tab which includes OP Adm Database Screening, Education and CarePlan  HBO risk assessment completed. Discharged, instructions and/or After Visit Summary given to patient/caregiver and instructions completed. []   3 Is this the Patient's First Visit to the [facility] [address] [facility] [address] Yes Is this Patient Established @ [facility] 
Yes Clinical Level of Care Points  0-2  Level 1 [] Points  3-5  Level 2 [] Points  6-9  Level 3 [x] Points  10-12  Level 4 [] Points  13-15  Level 5 [] Electronically signed by Azam Bradshaw RN on 11/5/2019 at 10:21 AM

## 2019-11-05 NOTE — DISCHARGE INSTRUCTIONS
Discharge Instructions for  [place] [id number] [facility], [address]  Telephone: [phone]    NAME:  Zac Rodríguez  YOB: 1929  MEDICAL RECORD NUMBER:  [id number]  DATE:  11/5/2019    Wound Cleansing:   Do not scrub or use excessive force. Cleanse wound prior to applying a clean dressing with:  [] Normal Saline [] Keep Wound Dry in Shower    [] Wound Cleanser   [x] Cleanse wound with Mild Soap & Water  Dayanna wound skin  [] May Shower at Discharge   [] Other:      Topical Treatments:  Do not apply lotions, creams, or ointments to wound bed unless directed. [] Apply moisturizing lotion to skin surrounding the wound prior to dressing change.  [] Apply antifungal ointment to skin surrounding the wound prior to dressing change.  [] Apply thin film of moisture barrier ointment to skin immediately around wound. [x] Other:  Bactracin ointment     Dressings:           Wound Location Scalp  [x] Apply Primary Dressing:       [] MediHoney Gel [] Alginate with Silver [] Alginate   [] Collagen [] Collagen with Silver   [] Santyl with Moisten saline gauze     [] Hydrocolloid   [] MediHoney Alginate [] Foam with Silver   [] Foam   [] Hydrofera Blue    [] Mepilex Border    [] Moisten with Saline [] Hydrogel [] Mepitel     [] Bactroban/Mupirocin [] Polysporin  [x] Other:  Adaptic or other nonstick gauze  [] Pack wound loosely with  [] Iodoform   [] Plain Packing  [] Other   [] Cover and Secure with:     [] Gauze [] Lachelle [] Kerlix   [] Ace Wrap [] Cover Roll Tape [] ABD     [] Other:    Avoid contact of tape with skin.   [x] Change dressing: [x] Daily    [] Every Other Day [] Three times per week   [] Once a week [] Do Not Change Dressing   [] Other:    Dietary:  [x] Diet as tolerated: [] Calorie Diabetic Diet: [] No Added Salt:  [x] Increase Protein: [] Other:   Activity:  [x] Activity as tolerated:  [] Patient has no activity restrictions     [] Strict Bedrest: [] Remain off Work:     [] May return to full duty work:                                   [] Return to work with restrictions:             Return Appointment:  [] Wound and dressing supply provider:   [] ECF or Home Healthcare:  [] Wound Assessment: [] Physician or NP scheduled for Wound Assessment:   [x] Return Appointment: No follow-up needed. [] Ordered tests:      Electronically signed Kiara Romano RN on 11/5/2019 at 9:39 AM     [address] Information: Should you experience any significant changes in your wound(s) or have questions about your wound care, please contact the [address] [place] [address] at [address] [place] [address] 8:00 am - 4:30. If you need help with your wound outside these hours and cannot wait until we are again available, contact your PCP or go to the hospital emergency room. PLEASE NOTE: IF YOU ARE UNABLE TO OBTAIN WOUND SUPPLIES, CONTINUE TO USE THE SUPPLIES YOU HAVE AVAILABLE UNTIL YOU ARE ABLE TO REACH US. IT IS MOST IMPORTANT TO KEEP THE WOUND COVERED AT ALL TIMES.      Physician Signature:_______________________    Date: ___________ Time:  ____________

## 2019-11-05 NOTE — WOUND CARE
11/05/19 0910 Wound Scalp scalp wound     wound #1 11/05/19 Date First Assessed/Time First Assessed: 11/05/19 0906   Wound Approximate Age at First Assessment (Weeks): (c)   Primary Wound Type: (c)   Location: Scalp  Wound Description: scalp wound     wound #1  Date of First Observation: 11/05/19 Dressing Status Removed Dressing Type Gauze Non-staged Wound Description (crater r/t CA) Wound Length (cm) 4 cm Wound Width (cm) 3.5 cm Wound Depth (cm) 1 cm Wound Surface Area (cm^2) 14 cm^2 Wound Volume (cm^3) 14 cm^3 Condition of Base ([facility]) Condition of Edges Closed Drainage Amount None Wound Odor None Cleansing and Cleansing Agents  Normal saline 80year old gentleman with 20 year history of leukemia, now with scalp wound, hx of split thickness graft in April.

## 2019-11-06 ENCOUNTER — OFFICE VISIT (OUTPATIENT)
Dept: CARDIOLOGY CLINIC | Age: 84
End: 2019-11-06

## 2019-11-06 VITALS
HEART RATE: 75 BPM | RESPIRATION RATE: 18 BRPM | BODY MASS INDEX: 32.87 KG/M2 | HEIGHT: 62 IN | DIASTOLIC BLOOD PRESSURE: 62 MMHG | OXYGEN SATURATION: 97 % | WEIGHT: 178.6 LBS | SYSTOLIC BLOOD PRESSURE: 120 MMHG

## 2019-11-06 DIAGNOSIS — I25.10 ATHEROSCLEROSIS OF NATIVE CORONARY ARTERY OF NATIVE HEART WITHOUT ANGINA PECTORIS: ICD-10-CM

## 2019-11-06 DIAGNOSIS — E78.2 MIXED HYPERLIPIDEMIA: ICD-10-CM

## 2019-11-06 DIAGNOSIS — I10 ESSENTIAL HYPERTENSION, BENIGN: Primary | ICD-10-CM

## 2019-11-06 DIAGNOSIS — Z98.61 POSTSURGICAL PERCUTANEOUS TRANSLUMINAL CORONARY ANGIOPLASTY STATUS: ICD-10-CM

## 2019-11-06 RX ORDER — DONEPEZIL HYDROCHLORIDE 5 MG/1
TABLET, FILM COATED ORAL
Refills: 1 | COMMUNITY
Start: 2019-10-21 | End: 2021-05-06

## 2019-11-06 NOTE — PROGRESS NOTES
1. Have you been to the ER, urgent care clinic since your last visit? Hospitalized since your last visit? No.      2. Have you seen or consulted any other health care providers outside of the [address] [place] [address] since your last visit? Include any pap smears or colon screening.    No.        Chief Complaint   Patient presents with    Follow-up     6 month- pt denies any cardiac symptoms

## 2019-11-06 NOTE — PROGRESS NOTES
Raysa Mann MD          NAME:  Marvel Cartwright   :   1929   MRN:   [id number]   PCP:  Palak Andrews MD           Subjective: The patient is a 80y.o. year old male  who returns for a routine follow-up. Since the last visit, patient reports no change in exercise tolerance, chest pain, edema, medication intolerance, palpitations, shortness of breath, PND/orthopnea wheezing, sputum, syncope, dizziness or light headedness. Doing well. Past Medical History:   Diagnosis Date    Arthritis     SHOULDERS AND SPINE    CAD (coronary artery disease) 1998    [place] CARDIAC CATH, STENT X 2    Cancer ([facility] 2008    chronic lymphocytic leukemia    Cancer ([facility]     multiple skin cancers     Chest pain, unspecified     Chronic pain     L 2 LYTIC LESION    Edema     Hypertension     Ill-defined condition     GLUCOMA    [facility]     Postsurgical percutaneous transluminal coronary angioplasty status 2012    Pre-operative cardiovascular examination     Thyroid disease     HYPO        ICD-10-CM ICD-9-CM    1. Essential hypertension, benign I10 401.1 AMB POC EKG ROUTINE W/ 12 LEADS, INTER & REP   2. Mixed hyperlipidemia E78.2 272.2 AMB POC EKG ROUTINE W/ 12 LEADS, INTER & REP   3. Atherosclerosis of native coronary artery of native heart without angina pectoris I25.10 414.01    4.  Postsurgical percutaneous transluminal coronary angioplasty status Z98.61 V45.82       Social History     Tobacco Use    Smoking status: Former Smoker     Packs/day: 0.50     Years: 15.00     Pack years: 7.50     Types: Cigarettes     Last attempt to quit: 1974     Years since quittin.8    Smokeless tobacco: Never Used   Substance Use Topics    Alcohol use: No      Family History   Problem Relation Age of Onset    Diabetes Mother     Heart Disease Father     Heart Attack Father 80    Lung Disease Sister         PULMONARY FIBROSIS    Heart Attack Brother     No Known Problems Sister     Anesth Problems Neg Hx         Review of Systems  Cardiovascular: Negative except as noted in HPI      Objective:       Vitals:    11/06/19 1134 11/06/19 1147   BP: 118/72 120/62   Pulse: 75    Resp: 18    SpO2: 97%    Weight: 178 lb 9.6 oz (81 kg)    Height: 5' 2\" (1.575 m)     Body mass index is 32.67 kg/m². General PE  Mental Status - Alert. General Appearance - Not in acute distress. Chest and Lung Exam   Inspection: Accessory muscles - No use of accessory muscles in breathing. Auscultation:   Breath sounds: - Normal.    Cardiovascular   Inspection: Jugular vein - Bilateral - Inspection Normal.  Palpation/Percussion:   Apical Impulse: - Normal.  Auscultation: Rhythm - Regular. Heart Sounds - S1 WNL and S2 WNL. No S3 or S4. Murmurs & Other Heart Sounds: Auscultation of the heart reveals - No Murmurs. Peripheral Vascular   Upper Extremity: Inspection - Bilateral - No Cyanotic nailbeds or Digital clubbing. Lower Extremity:   Palpation: Edema - Bilateral - No edema. Data Review:     EKG -  EKG: normal EKG, normal sinus rhythm, unchanged from previous tracings. LABS- @brieflabs@      Allergies reviewed  Allergies   Allergen Reactions    Statins-Hmg-Coa Reductase Inhibitors Myalgia and Other (comments)     Causes aching, generalized       Medications reviewed  Current Outpatient Medications   Medication Sig    donepezil (ARICEPT) 5 mg tablet TAKE 1 TABLET BY MOUTH EVERYDAY AT BEDTIME    isosorbide mononitrate (ISMO, MONOKET) 20 mg tablet Take 1 Tab by mouth two (2) times a day.  lisinopril (PRINIVIL, ZESTRIL) 20 mg tablet TAKE 1 TABLET BY MOUTH EVERY DAY    ezetimibe (ZETIA) 10 mg tablet TAKE ONE TABLET BY MOUTH AT NIGHT    aspirin delayed-release 81 mg tablet Take  by mouth daily.  fluticasone (FLONASE) 50 mcg/actuation nasal spray 1 spray each nostril daily (Patient taking differently: 1 Spray by Both Nostrils route as needed.  1 spray each nostril daily)    amLODIPine (NORVASC) 5 mg tablet Take 1 Tab by mouth two (2) times a day. Increases 9/28/18    LUMIGAN 0.01 % ophthalmic drops INSTILL 1 DROP INTO BOTH EYES NIGHTLY    HYDROcodone-acetaminophen (NORCO) 7.5-325 mg per tablet Take 2 Tabs by mouth every six (6) hours as needed for Pain for up to 30 doses. Max Daily Amount: 8 Tabs. Indications: Pain    nitroglycerin (NITROSTAT) 0.4 mg SL tablet 1 Tab by SubLINGual route every five (5) minutes as needed for Chest Pain (max 3 tabs daily for chest pain). Up to 3 doses.  cholecalciferol (VITAMIN D3) 1,000 unit tablet Take 5,000 Units by mouth daily (with breakfast).  timolol (TIMOPTIC) 0.5 % ophthalmic solution Administer 1 Drop to both eyes two (2) times a day.  bimatoprost (LUMIGAN) 0.03 % ophthalmic drops Administer 1 Drop to both eyes nightly.  acetaminophen (TYLENOL EXTRA STRENGTH) 500 mg tablet Take  by mouth every six (6) hours as needed for Pain.  brinzolamide-brimonidine (SIMBRINZA) 1-0.2 % drps Administer 1 Drop to both eyes two (2) times a day.  finasteride (PROSCAR) 5 mg tablet Take 5 mg by mouth daily.  silodosin (RAPAFLO) 8 mg capsule Take 8 mg by mouth daily (with breakfast).  levothyroxine (SYNTHROID) 25 mcg tablet Take 25 mcg by mouth Daily (before breakfast). No current facility-administered medications for this visit. Facility-Administered Medications Ordered in Other Visits   Medication Dose Route Frequency    lidocaine (ALOCANE) 4 % topical gel   Topical PRN    mupirocin (BACTROBAN) 2 % ointment   Topical PRN         Assessment:       ICD-10-CM ICD-9-CM    1. Essential hypertension, benign I10 401.1 AMB POC EKG ROUTINE W/ 12 LEADS, INTER & REP   2. Mixed hyperlipidemia E78.2 272.2 AMB POC EKG ROUTINE W/ 12 LEADS, INTER & REP   3. Atherosclerosis of native coronary artery of native heart without angina pectoris I25.10 414.01    4.  Postsurgical percutaneous transluminal coronary angioplasty status Z98.61 V45.82         Orders Placed This Encounter    AMB POC EKG ROUTINE W/ 12 LEADS, INTER & REP     Order Specific Question:   Reason for Exam:     Answer:   routine    donepezil (ARICEPT) 5 mg tablet     Sig: TAKE 1 TABLET BY MOUTH EVERYDAY AT BEDTIME     Refill:  1       Plan:     Asymptomatic. BP at target. EKG OK. Lipids per PCP.   F/U 1 yr    Alex Garcia MD

## 2019-11-19 RX ORDER — EZETIMIBE 10 MG/1
TABLET ORAL
Qty: 90 TAB | Refills: 1 | Status: SHIPPED | OUTPATIENT
Start: 2019-11-19 | End: 2020-06-18 | Stop reason: SDUPTHER

## 2019-11-25 RX ORDER — AMLODIPINE BESYLATE 5 MG/1
TABLET ORAL
Qty: 180 TAB | Refills: 3 | Status: SHIPPED | OUTPATIENT
Start: 2019-11-25 | End: 2020-05-21 | Stop reason: SDUPTHER

## 2019-11-26 ENCOUNTER — HOSPITAL ENCOUNTER (OUTPATIENT)
Dept: MRI IMAGING | Age: 84
Discharge: HOME | End: 2019-11-26
Attending: ORTHOPAEDIC SURGERY
Payer: MEDICARE

## 2019-11-26 DIAGNOSIS — M54.2 CERVICALGIA: ICD-10-CM

## 2019-11-26 DIAGNOSIS — M47.812 CERVICAL SPONDYLOSIS WITHOUT MYELOPATHY: ICD-10-CM

## 2019-11-26 PROCEDURE — 72141 MRI NECK SPINE W/O DYE: CPT

## 2019-12-10 RX ORDER — LISINOPRIL 20 MG/1
TABLET ORAL
Qty: 90 TAB | Refills: 1 | Status: SHIPPED | OUTPATIENT
Start: 2019-12-10 | End: 2020-05-28

## 2020-03-17 RX ORDER — ISOSORBIDE MONONITRATE 20 MG/1
TABLET ORAL
Qty: 180 TAB | Refills: 3 | Status: SHIPPED | OUTPATIENT
Start: 2020-03-17 | End: 2020-05-21 | Stop reason: SDUPTHER

## 2020-03-17 NOTE — TELEPHONE ENCOUNTER
Pt needs refill for med Isosorbide with the Order times to be taken at 7 am and 2 pm called in to [facility] @ [phone]. Thanks.

## 2020-05-21 RX ORDER — ISOSORBIDE MONONITRATE 20 MG/1
TABLET ORAL
Qty: 180 TAB | Refills: 1 | Status: SHIPPED | OUTPATIENT
Start: 2020-05-21 | End: 2020-06-29

## 2020-05-21 RX ORDER — AMLODIPINE BESYLATE 5 MG/1
TABLET ORAL
Qty: 180 TAB | Refills: 1 | Status: SHIPPED | OUTPATIENT
Start: 2020-05-21 | End: 2020-06-18 | Stop reason: SDUPTHER

## 2020-05-21 NOTE — TELEPHONE ENCOUNTER
Spoke with patient. Verified patient with two patient identifiers. Verified [facility] with pt. PCP follows fasting labs, chol meds per Dr. Jeffry Maurer note.

## 2020-05-28 RX ORDER — LISINOPRIL 20 MG/1
TABLET ORAL
Qty: 30 TAB | Refills: 2 | Status: SHIPPED | OUTPATIENT
Start: 2020-05-28 | End: 2020-11-11 | Stop reason: SDUPTHER

## 2020-05-30 ENCOUNTER — APPOINTMENT (OUTPATIENT)
Dept: GENERAL RADIOLOGY | Age: 85
End: 2020-05-30
Attending: EMERGENCY MEDICINE
Payer: MEDICARE

## 2020-05-30 ENCOUNTER — APPOINTMENT (OUTPATIENT)
Dept: CT IMAGING | Age: 85
End: 2020-05-30
Attending: EMERGENCY MEDICINE
Payer: MEDICARE

## 2020-05-30 ENCOUNTER — HOSPITAL ENCOUNTER (EMERGENCY)
Age: 85
Discharge: HOME | End: 2020-05-30
Attending: EMERGENCY MEDICINE
Payer: MEDICARE

## 2020-05-30 VITALS
HEIGHT: 63 IN | OXYGEN SATURATION: 95 % | WEIGHT: 165.79 LBS | TEMPERATURE: 98 F | HEART RATE: 68 BPM | SYSTOLIC BLOOD PRESSURE: 129 MMHG | RESPIRATION RATE: 17 BRPM | DIASTOLIC BLOOD PRESSURE: 61 MMHG | BODY MASS INDEX: 29.38 KG/M2

## 2020-05-30 DIAGNOSIS — S70.02XA CONTUSION OF LEFT HIP, INITIAL ENCOUNTER: ICD-10-CM
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
## 2020-05-30 DIAGNOSIS — S09.90XA CLOSED HEAD INJURY, INITIAL ENCOUNTER: ICD-10-CM

## 2020-05-30 DIAGNOSIS — R73.9 HYPERGLYCEMIA: ICD-10-CM

## 2020-05-30 DIAGNOSIS — M47.819 FACET ARTHROPATHY: ICD-10-CM

## 2020-05-30 LAB
ABO + RH BLD: NORMAL
ANION GAP SERPL CALC-SCNC: 4 MMOL/L (ref 5–15)
APPEARANCE UR: CLEAR
BASOPHILS # BLD: 0 K/UL (ref 0–0.1)
BASOPHILS NFR BLD: 1 % (ref 0–1)
BILIRUB UR QL: NEGATIVE
BLOOD GROUP ANTIBODIES SERPL: NORMAL
BUN SERPL-MCNC: 15 MG/DL (ref 6–20)
BUN/CREAT SERPL: 16 (ref 12–20)
CALCIUM SERPL-MCNC: 8.6 MG/DL (ref 8.5–10.1)
CHLORIDE SERPL-SCNC: 105 MMOL/L (ref 97–108)
CO2 SERPL-SCNC: 26 MMOL/L (ref 21–32)
COLOR UR: NORMAL
CREAT SERPL-MCNC: 0.94 MG/DL (ref 0.7–1.3)
DIFFERENTIAL METHOD BLD: NORMAL
EOSINOPHIL # BLD: 0.1 K/UL (ref 0–0.4)
EOSINOPHIL NFR BLD: 1 % (ref 0–7)
ERYTHROCYTE [DISTWIDTH] IN BLOOD BY AUTOMATED COUNT: 14.5 % (ref 11.5–14.5)
GLUCOSE SERPL-MCNC: 148 MG/DL (ref 65–100)
GLUCOSE UR STRIP.AUTO-MCNC: NEGATIVE MG/DL
HCT VFR BLD AUTO: 42.4 % (ref 36.6–50.3)
HGB BLD-MCNC: 14.5 G/DL (ref 12.1–17)
HGB UR QL STRIP: NEGATIVE
IMM GRANULOCYTES # BLD AUTO: 0 K/UL (ref 0–0.04)
IMM GRANULOCYTES NFR BLD AUTO: 0 % (ref 0–0.5)
KETONES UR QL STRIP.AUTO: NEGATIVE MG/DL
LEUKOCYTE ESTERASE UR QL STRIP.AUTO: NEGATIVE
LYMPHOCYTES # BLD: 1.2 K/UL (ref 0.8–3.5)
LYMPHOCYTES NFR BLD: 15 % (ref 12–49)
MCH RBC QN AUTO: 28.8 PG (ref 26–34)
MCHC RBC AUTO-ENTMCNC: 34.2 G/DL (ref 30–36.5)
MCV RBC AUTO: 84.1 FL (ref 80–99)
MONOCYTES # BLD: 0.7 K/UL (ref 0–1)
MONOCYTES NFR BLD: 9 % (ref 5–13)
NEUTS SEG # BLD: 5.9 K/UL (ref 1.8–8)
NEUTS SEG NFR BLD: 74 % (ref 32–75)
NITRITE UR QL STRIP.AUTO: NEGATIVE
NRBC # BLD: 0 K/UL (ref 0–0.01)
NRBC BLD-RTO: 0 PER 100 WBC
PH UR STRIP: 5.5 [PH] (ref 5–8)
PLATELET # BLD AUTO: 321 K/UL (ref 150–400)
PMV BLD AUTO: 9.1 FL (ref 8.9–12.9)
POTASSIUM SERPL-SCNC: 4.8 MMOL/L (ref 3.5–5.1)
PROT UR STRIP-MCNC: NEGATIVE MG/DL
RBC # BLD AUTO: 5.04 M/UL (ref 4.1–5.7)
SODIUM SERPL-SCNC: 135 MMOL/L (ref 136–145)
SP GR UR REFRACTOMETRY: 1.01 (ref 1–1.03)
SPECIMEN EXP DATE BLD: NORMAL
UROBILINOGEN UR QL STRIP.AUTO: 1 EU/DL (ref 0.2–1)
WBC # BLD AUTO: 8 K/UL (ref 4.1–11.1)

## 2020-05-30 PROCEDURE — 86900 BLOOD TYPING SEROLOGIC ABO: CPT

## 2020-05-30 PROCEDURE — 73502 X-RAY EXAM HIP UNI 2-3 VIEWS: CPT

## 2020-05-30 PROCEDURE — 96374 THER/PROPH/DIAG INJ IV PUSH: CPT

## 2020-05-30 PROCEDURE — 74011250636 HC RX REV CODE- 250/636: Performed by: EMERGENCY MEDICINE

## 2020-05-30 PROCEDURE — 72192 CT PELVIS W/O DYE: CPT

## 2020-05-30 PROCEDURE — 36415 COLL VENOUS BLD VENIPUNCTURE: CPT

## 2020-05-30 PROCEDURE — 85025 COMPLETE CBC W/AUTO DIFF WBC: CPT

## 2020-05-30 PROCEDURE — 99285 EMERGENCY DEPT VISIT HI MDM: CPT

## 2020-05-30 PROCEDURE — 93005 ELECTROCARDIOGRAM TRACING: CPT

## 2020-05-30 PROCEDURE — 80048 BASIC METABOLIC PNL TOTAL CA: CPT

## 2020-05-30 PROCEDURE — 72125 CT NECK SPINE W/O DYE: CPT

## 2020-05-30 PROCEDURE — 81003 URINALYSIS AUTO W/O SCOPE: CPT

## 2020-05-30 PROCEDURE — 71045 X-RAY EXAM CHEST 1 VIEW: CPT

## 2020-05-30 PROCEDURE — 70450 CT HEAD/BRAIN W/O DYE: CPT

## 2020-05-30 RX ORDER — MORPHINE SULFATE 2 MG/ML
2 INJECTION, SOLUTION INTRAMUSCULAR; INTRAVENOUS
Status: COMPLETED | OUTPATIENT
Start: 2020-05-30 | End: 2020-05-30

## 2020-05-30 RX ADMIN — MORPHINE SULFATE 2 MG: 2 INJECTION, SOLUTION INTRAMUSCULAR; INTRAVENOUS at 17:01

## 2020-05-30 NOTE — ED PROVIDER NOTES
EMERGENCY DEPARTMENT HISTORY AND PHYSICAL EXAM      Date: 5/30/2020  Patient Name: Zeynep Beauchamp    History of Presenting Illness     Chief Complaint   Patient presents with    Fall       History Provided By: Patient    HPI: Zeynep Beauchamp, 80 y.o. male with PMHx as noted below presents the emergency department for evaluation after mechanical fall. Patient states that he was getting out of bed when he lost his footing, falling onto his left hip and hitting his head. Patient currently complaining of aching pain in his left hip that is constant and worse with movement, the pain does not radiate. Denying any loss of consciousness or headache. He denies any prodrome, dizziness, chest pain or syncope. Patient's family does note that he has been coming increasingly weak over the last several months. Patient reports no acutely worsening weakness, numbness, bowel/bladder dysfunction. PCP: Ariana Patel MD    Current Outpatient Medications   Medication Sig Dispense Refill    lisinopriL (PRINIVIL, ZESTRIL) 20 mg tablet TAKE 1 TABLET BY MOUTH EVERY DAY 30 Tab 2    amLODIPine (NORVASC) 5 mg tablet TAKE 1 TABLET BY MOUTH TWICE A  Tab 1    isosorbide mononitrate (ISMO, MONOKET) 20 mg tablet Take 1 tablet every 12 hours 180 Tab 1    ezetimibe (ZETIA) 10 mg tablet TAKE 1 TABLET BY MOUTH AT NIGHT 90 Tab 1    donepezil (ARICEPT) 5 mg tablet TAKE 1 TABLET BY MOUTH EVERYDAY AT BEDTIME  1    aspirin delayed-release 81 mg tablet Take  by mouth daily.  fluticasone (FLONASE) 50 mcg/actuation nasal spray 1 spray each nostril daily (Patient taking differently: 1 Spray by Both Nostrils route as needed. 1 spray each nostril daily) 1 Bottle 1    LUMIGAN 0.01 % ophthalmic drops INSTILL 1 DROP INTO BOTH EYES NIGHTLY  4    HYDROcodone-acetaminophen (NORCO) 7.5-325 mg per tablet Take 2 Tabs by mouth every six (6) hours as needed for Pain for up to 30 doses. Max Daily Amount: 8 Tabs.  Indications: Pain 30 Tab 0    nitroglycerin (NITROSTAT) 0.4 mg SL tablet 1 Tab by SubLINGual route every five (5) minutes as needed for Chest Pain (max 3 tabs daily for chest pain). Up to 3 doses. 1 Bottle 1    cholecalciferol (VITAMIN D3) 1,000 unit tablet Take 5,000 Units by mouth daily (with breakfast).  timolol (TIMOPTIC) 0.5 % ophthalmic solution Administer 1 Drop to both eyes two (2) times a day.  bimatoprost (LUMIGAN) 0.03 % ophthalmic drops Administer 1 Drop to both eyes nightly.  acetaminophen (TYLENOL EXTRA STRENGTH) 500 mg tablet Take  by mouth every six (6) hours as needed for Pain.  brinzolamide-brimonidine (SIMBRINZA) 1-0.2 % drps Administer 1 Drop to both eyes two (2) times a day.  finasteride (PROSCAR) 5 mg tablet Take 5 mg by mouth daily.  silodosin (RAPAFLO) 8 mg capsule Take 8 mg by mouth daily (with breakfast).  levothyroxine (SYNTHROID) 25 mcg tablet Take 25 mcg by mouth Daily (before breakfast).          Past History     Past Medical History:  Past Medical History:   Diagnosis Date    Arthritis     SHOULDERS AND SPINE    CAD (coronary artery disease) 7/1998    MI CARDIAC CATH, STENT X 2    Cancer ([address] 04/2008    chronic lymphocytic leukemia    Cancer ([address]     multiple skin cancers     Chest pain, unspecified     Chronic pain     L 2 LYTIC LESION    Edema     Hypertension     Ill-defined condition     GLUCOMA    MI (myocardial infarction) ([address]     Postsurgical percutaneous transluminal coronary angioplasty status 6/4/2012    Pre-operative cardiovascular examination     Thyroid disease     HYPO       Past Surgical History:  Past Surgical History:   Procedure Laterality Date    CARDIAC SURG PROCEDURE UNLIST  7/1998    2 Cardiac stents    EGD  6/7/2010         HX ADENOIDECTOMY  1936    HX APPENDECTOMY  1954    HX CATARACT REMOVAL      bilateral    HX ORTHOPAEDIC  4/15/2008    Right hip replacement    HX OTHER SURGICAL  5/2011    Moh's procedure,     HX OTHER SURGICAL      MULTIPLES SKIN CANCER REMOVALS, MOSTLY ON HEAD    HX SKIN BIOPSY  2018    on scalp     HX TONSILLECTOMY  1936    89       Family History:  Family History   Problem Relation Age of Onset    Diabetes Mother     Heart Disease Father     Heart Attack Father 80    Lung Disease Sister         PULMONARY FIBROSIS    Heart Attack Brother     No Known Problems Sister     Anesth Problems Neg Hx        Social History:  Social History     Tobacco Use    Smoking status: Former Smoker     Packs/day: 0.50     Years: 15.00     Pack years: 7.50     Types: Cigarettes     Last attempt to quit: 1974     Years since quittin.4    Smokeless tobacco: Never Used   Substance Use Topics    Alcohol use: No    Drug use: No       Allergies: Allergies   Allergen Reactions    Statins-Hmg-Coa Reductase Inhibitors Myalgia and Other (comments)     Causes aching, generalized         Review of Systems   Review of Systems  Constitutional: Negative for fever, chills, and fatigue. HENT: Negative for congestion, sore throat, rhinorrhea, sneezing and neck stiffness   Eyes: Negative for discharge and redness. Respiratory: Negative for  shortness of breath, wheezing   Cardiovascular: Negative for chest pain, palpitations   Gastrointestinal: Negative for nausea, vomiting, abdominal pain,   Genitourinary: Negative for dysuria, urgency, frequency, hematuria, flank pain,   Musculoskeletal: Negative for myalgias. Positive hip pain  Skin: Negative for rash or lesions . Neurological: Negative weakness, light-headedness,         Physical Exam   Physical Exam    GENERAL: alert and oriented, no acute distress  EYES: PEERL, No injection, discharge or icterus. HENT: Mucous membranes pink and moist.  Dressing covering previous skin cancer removal on head  NECK: Supple  LUNGS: Airway patent. Non-labored respirations. Breath sounds clear with good air entry bilaterally. HEART: Regular rate and rhythm.  No peripheral edema  ABDOMEN: Non-distended and non-tender, without guarding or rebound. SKIN:  warm, dry  EXTREMITIES: There is tenderness over the left hip, pain with range of motion, extremity is shortened and rotated. Neurovascularly intact distally. NEUROLOGICAL: Alert, oriented, strength 4/5 bilateral upper and lower extremities, sensation intact and equal throughout to light touch      Diagnostic Study Results     Labs -     Recent Results (from the past 12 hour(s))   EKG, 12 LEAD, INITIAL    Collection Time: 05/30/20  4:27 PM   Result Value Ref Range    Ventricular Rate 66 BPM    Atrial Rate 66 BPM    QRS Duration 80 ms    Q-T Interval 392 ms    QTC Calculation (Bezet) 410 ms    Calculated R Axis -28 degrees    Calculated T Axis 40 degrees    Diagnosis Accelerated Junctional rhythm    CBC WITH AUTOMATED DIFF    Collection Time: 05/30/20  5:04 PM   Result Value Ref Range    WBC 8.0 4.1 - 11.1 K/uL    RBC 5.04 4.10 - 5.70 M/uL    HGB 14.5 12.1 - 17.0 g/dL    HCT 42.4 36.6 - 50.3 %    MCV 84.1 80.0 - 99.0 FL    MCH 28.8 26.0 - 34.0 PG    MCHC 34.2 30.0 - 36.5 g/dL    RDW 14.5 11.5 - 14.5 %    PLATELET 879 021 - 679 K/uL    MPV 9.1 8.9 - 12.9 FL    NRBC 0.0 0  WBC    ABSOLUTE NRBC 0.00 0.00 - 0.01 K/uL    NEUTROPHILS 74 32 - 75 %    LYMPHOCYTES 15 12 - 49 %    MONOCYTES 9 5 - 13 %    EOSINOPHILS 1 0 - 7 %    BASOPHILS 1 0 - 1 %    IMMATURE GRANULOCYTES 0 0.0 - 0.5 %    ABS. NEUTROPHILS 5.9 1.8 - 8.0 K/UL    ABS. LYMPHOCYTES 1.2 0.8 - 3.5 K/UL    ABS. MONOCYTES 0.7 0.0 - 1.0 K/UL    ABS. EOSINOPHILS 0.1 0.0 - 0.4 K/UL    ABS. BASOPHILS 0.0 0.0 - 0.1 K/UL    ABS. IMM.  GRANS. 0.0 0.00 - 0.04 K/UL    DF AUTOMATED     METABOLIC PANEL, BASIC    Collection Time: 05/30/20  5:04 PM   Result Value Ref Range    Sodium 135 (L) 136 - 145 mmol/L    Potassium 4.8 3.5 - 5.1 mmol/L    Chloride 105 97 - 108 mmol/L    CO2 26 21 - 32 mmol/L    Anion gap 4 (L) 5 - 15 mmol/L    Glucose 148 (H) 65 - 100 mg/dL    BUN 15 6 - 20 MG/DL Creatinine 0.94 0.70 - 1.30 MG/DL    BUN/Creatinine ratio 16 12 - 20      GFR est AA >60 >60 ml/min/1.73m2    GFR est non-AA >60 >60 ml/min/1.73m2    Calcium 8.6 8.5 - 10.1 MG/DL   TYPE & SCREEN    Collection Time: 05/30/20  5:04 PM   Result Value Ref Range    Crossmatch Expiration 06/02/2020     ABO/Rh(D) A NEGATIVE     Antibody screen NEG    URINALYSIS W/ RFLX MICROSCOPIC    Collection Time: 05/30/20  5:56 PM   Result Value Ref Range    Color YELLOW/STRAW      Appearance CLEAR CLEAR      Specific gravity 1.014 1.003 - 1.030      pH (UA) 5.5 5.0 - 8.0      Protein Negative NEG mg/dL    Glucose Negative NEG mg/dL    Ketone Negative NEG mg/dL    Bilirubin Negative NEG      Blood Negative NEG      Urobilinogen 1.0 0.2 - 1.0 EU/dL    Nitrites Negative NEG      Leukocyte Esterase Negative NEG         Radiologic Studies -   CT SPINE CERV WO CONT   Final Result   IMPRESSION:   Multilevel degenerative changes with severe erosive arthropathy on the right at   C1-2. No acute findings. CT PELV WO CONT   Final Result   IMPRESSION:       No acute fracture. No significant change since 2015. CT HEAD WO CONT   Final Result   IMPRESSION:    No evidence for acute intracranial trauma. Increase in size of left frontal calvarial defect compatible with probable   interval resection/treatment. 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 Recommend CT of the cervical spine to   fully evaluate            XR HIP LT W OR WO PELV 2-3 VWS   Final Result   IMPRESSION:    No acute abnormality. XR CHEST PORT   Final Result   IMPRESSION: Clear lungs. CT Results  (Last 48 hours)               05/30/20 1936  CT SPINE CERV WO CONT Final result    Impression:  IMPRESSION:   Multilevel degenerative changes with severe erosive arthropathy on the right at   C1-2. No acute findings. Narrative:  EXAM:  CT CERVICAL SPINE WITHOUT CONTRAST       INDICATION: per rads request.       COMPARISON: None. CONTRAST:  None. TECHNIQUE: Multislice helical CT of the cervical spine was performed without   intravenous contrast administration. Sagittal and coronal reformats were   generated. CT dose reduction was achieved through use of a standardized   protocol tailored for this examination and automatic exposure control for dose   modulation. FINDINGS:       There is a levoscoliosis. There is no fracture or compression deformity. The   odontoid process is intact. The craniocervical junction is within normal limits. 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 These findings appear chronic. There is erosion of   the right odontoid process which appears chronic. The incidentally imaged soft tissues are within normal limits. C2-C3: Facet arthropathy with mild right neural foraminal narrowing. C3-C4: Facet arthropathy with severe right neural foraminal narrowing. C4-C5: Anterior osteophyte formation. Facet arthropathy and uncinate process   hypertrophy with right neural foraminal narrowing. C5-C6: Disc space narrowing. Facet arthropathy with mild right neural foraminal   narrowing. C6-C7: Disc space narrowing. Uncinate process hypertrophy with left neural   foraminal narrowing. C7-T1: There is no spinal canal or neural foraminal stenosis. 05/30/20 1936  CT PELV WO CONT Final result    Impression:  IMPRESSION:        No acute fracture. No significant change since 2015. Narrative:  EXAM: CT PELV WO CONT       INDICATION: Left hip pain after a fall        COMPARISON: Left hip Views earlier this evening. CT pelvis on 6/9/2015. TECHNIQUE: Helical CT of the bony pelvis with coronal and sagittal reformats. Images reviewed in soft tissue and bone windows. CT dose reduction was achieved   through the use of a standardized protocol tailored for this examination and   automatic exposure control for dose modulation. CONTRAST: None. FINDINGS: Bones: Bones are osteopenic. No acute fracture. No aggressive bone   lesion. Old, healed right pubic rami fractures. Hardware: Metal artifact arises from right hip prosthesis, which is in good   position. No surrounding lucency. Joint fluid: None. Articulations: Right hip prosthesis is in good position. Moderate left hip   osteoarthritis is unchanged as 2015. Lumbar spine degenerative disc disease and   stenoses are least moderate and unchanged since 2015. Tendons: No full-thickness tendon tear. Muscles: No intramuscular hematoma. No focal atrophy. Soft tissue mass: None. No acute process in the pelvis. Colonic diverticulosis   is unchanged. 05/30/20 1723  CT HEAD WO CONT Final result    Impression:  IMPRESSION:    No evidence for acute intracranial trauma. Increase in size of left frontal calvarial defect compatible with probable   interval resection/treatment. 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 Recommend CT of the cervical spine to   fully evaluate               Narrative:  EXAM: CT HEAD WO CONT       INDICATION: Ground-level fall. 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 COMPARISON: 2/13/2019. CONTRAST: None. TECHNIQUE: Unenhanced CT of the head was performed using 5 mm images. Brain and   bone windows were generated. Coronal and sagittal reformats. CT dose reduction   was achieved through use of a standardized protocol tailored for this   examination and automatic exposure control for dose modulation. FINDINGS:   The ventricles and sulci are normal in size, shape and configuration. Bifrontal   volume loss is seen. There is a 5 mm hypodensity in the white matter adjacent to   the right lateral ventricle (series 2, image 20). There is no significant white   matter disease. There is no intracranial hemorrhage, extra-axial collection, or   mass effect.  The basilar cisterns are open. No CT evidence of acute infarct. The bone windows demonstrate a 3.4 cm osseous defect in the left frontal bone. There is an erosive C1-2 articulation with a greater amount of erosion on the   right (coronal image 54) and lateral subluxation of the C1 arch relative to C2. Long styloid processes are seen bilaterally. CXR Results  (Last 48 hours)               05/30/20 1717  XR CHEST PORT Final result    Impression:  IMPRESSION: Clear lungs. Narrative:  EXAM: XR CHEST PORT       INDICATION: fall       COMPARISON: 2013       FINDINGS: A portable AP radiograph of the chest was obtained. The patient is on   a cardiac monitor. The lungs are clear. There is atherosclerosis of the aorta. The bones and soft tissues are grossly within normal limits. Medical Decision Making   I, Liborio Coronel MD am the first provider for this patient and am the attending of record for this patient encounter. I reviewed the vital signs, available nursing notes, past medical history, past surgical history, family history and social history. Vital Signs-Reviewed the patient's vital signs. Patient Vitals for the past 12 hrs:   Temp Pulse Resp BP SpO2   05/30/20 2100 98 °F (36.7 °C) 68 17 129/61 95 %   05/30/20 2030  (!) 56 15 113/54 94 %   05/30/20 2008 98.1 °F (36.7 °C) (!) 56 20 111/59 97 %   05/30/20 2000  (!) 57 18 103/46 95 %   05/30/20 1800  (!) 59 15 103/48 98 %   05/30/20 1632 98 °F (36.7 °C) 60 16 105/47 95 %   05/30/20 1630  62 (!) 0 105/47 94 %       Records Reviewed: Nursing Notes and Old Medical Records    Provider Notes (Medical Decision Making): On presentation, the patient is well appearing, in no acute distress with normal vital signs. Based on my history and exam the differential diagnosis for this patient includes hip fracture, contusion, intracranial hemorrhage, closed head injury.   Reportedly was mechanical ground-level fall however given patient's history of mild dementia did obtain basic labs which were reassuring. Imaging showed no acute pathology and patient was able to ambulate at his reported baseline with a walker. Did mention the incidental finding on CT of the cervical spine and the need for follow-up however does not appear to have any acute weakness from this and this is been a chronic, progressive decline over several months and felt stable for discharge and outpatient follow-up. Left consult with case management to set up home health/ physical therapy as I do feel he would benefit from the services. ED Course:   Initial assessment performed. The patients presenting problems have been discussed, and they are in agreement with the care plan formulated and outlined with them. I have encouraged them to ask questions as they arise throughout their visit. David Artis  results have been reviewed with him. He has been counseled regarding his diagnosis. He verbally conveys understanding and agreement of the signs, symptoms, diagnosis, treatment and prognosis and additionally agrees to follow up as recommended with Dr. Prabhu Solorio MD in 24 - 48 hours. He also agrees with the care-plan and conveys that all of his questions have been answered. I have also put together some discharge instructions for him that include: 1) educational information regarding their diagnosis, 2) how to care for their diagnosis at home, as well a 3) list of reasons why they would want to return to the ED prior to their follow-up appointment, should their condition change. Disposition:  admission    PLAN:  1. Admit     Diagnosis     Clinical Impression:   1. Fall, initial encounter    2. Closed head injury, initial encounter    3. Contusion of left hip, initial encounter    4. Hyperglycemia    5. Facet arthropathy        Please note that this dictation was completed with Dragon, computer voice recognition software.   Quite often unanticipated grammatical, syntax, homophones, and other interpretive errors are inadvertently transcribed by the computer software. Please disregard these errors. Additionally, please excuse any errors that have escaped final proofreading.

## 2020-05-30 NOTE — ED NOTES
1622: Patient presents to the ED via EMS after a GLF earlier this morning. EMS states the family      36: MD Jean at bedside. 1646: PCT at bedside attempting IV insertion. 1752: PCT at bedside assisting patient to use urinal.     1950: Patient son provided with an update on plan of care. 2010: Nurse at bedside. Patient assisted to use urinal at this time. 2044: Call completed to patient's son, update provided. 2115: Per PCT patient able to stand and ambulate while using a walker (baseline). 2155: Patient dressed and prepared for discharge. Discharge will be review with patient's son. 2200: MD Pena reviewed discharge instructions with the patient and caregiver. The patient and caregiver verbalized understanding. Patient discharged home with stable vitals. Patient out of ED via wheelchair with discharge instructions in hand. Opportunity for questions and clarification provided. No further complaints noted at this time.

## 2020-05-31 NOTE — DISCHARGE INSTRUCTIONS
Patient Education        Preventing Falls: Care Instructions  Your Care Instructions    Getting around your home safely can be a challenge if you have injuries or health problems that make it easy for you to fall. Loose rugs and furniture in walkways are among the dangers for many older people who have problems walking or who have poor eyesight. People who have conditions such as arthritis, osteoporosis, or dementia also have to be careful not to fall. You can make your home safer with a few simple measures. Follow-up care is a key part of your treatment and safety. Be sure to make and go to all appointments, and call your doctor if you are having problems. It's also a good idea to know your test results and keep a list of the medicines you take. How can you care for yourself at home? Taking care of yourself  · You may get dizzy if you do not drink enough water. To prevent dehydration, drink plenty of fluids, enough so that your urine is light yellow or clear like water. Choose water and other caffeine-free clear liquids. If you have kidney, heart, or liver disease and have to limit fluids, talk with your doctor before you increase the amount of fluids you drink. · Exercise regularly to improve your strength, muscle tone, and balance. Walk if you can. Swimming may be a good choice if you cannot walk easily. · Have your vision and hearing checked each year or any time you notice a change. If you have trouble seeing and hearing, you might not be able to avoid objects and could lose your balance. · Know the side effects of the medicines you take. Ask your doctor or pharmacist whether the medicines you take can affect your balance. Sleeping pills or sedatives can affect your balance. · Limit the amount of alcohol you drink. Alcohol can impair your balance and other senses. · Ask your doctor whether calluses or corns on your feet need to be removed.  If you wear loose-fitting shoes because of calluses or corns, you can lose your balance and fall. · Talk to your doctor if you have numbness in your feet. Preventing falls at home  · Remove raised doorway thresholds, throw rugs, and clutter. Repair loose carpet or raised areas in the floor. · Move furniture and electrical cords to keep them out of walking paths. · Use nonskid floor wax, and wipe up spills right away, especially on ceramic tile floors. · If you use a walker or cane, put rubber tips on it. If you use crutches, clean the bottoms of them regularly with an abrasive pad, such as steel wool. · Keep your house well lit, especially [facility], and outside walkways. Use night-lights in areas such as hallways and bathrooms. Add extra light switches or use remote switches (such as switches that go on or off when you clap your hands) to make it easier to turn lights on if you have to get up during the night. · Install sturdy handrails on stairways. · Move items in your cabinets so that the things you use a lot are on the lower shelves (about waist level). · Keep a cordless phone and a flashlight with new batteries by your bed. If possible, put a phone in each of the main rooms of your house, or carry a cell phone in case you fall and cannot reach a phone. Or, you can wear a device around your neck or wrist. You push a button that sends a signal for help. · Wear low-heeled shoes that fit well and give your feet good support. Use footwear with nonskid soles. Check the heels and soles of your shoes for wear. Repair or replace worn heels or soles. · Do not wear socks without shoes on wood floors. · Walk on the grass when the sidewalks are slippery. If you live in an area that gets snow and ice in the winter, sprinkle salt on slippery steps and sidewalks. Preventing falls in the bath  · Install grab bars and nonskid mats inside and outside your shower or tub and near the toilet and sinks. · Use shower chairs and bath benches.   · Use a hand-held shower head that will allow you to sit while showering. · Get into a tub or shower by putting the weaker leg in first. Get out of a tub or shower with your strong side first.  · Repair loose toilet seats and consider installing a raised toilet seat to make getting on and off the toilet easier. · Keep your bathroom door unlocked while you are in the shower. Where can you learn more? Go to http://jazmyn-minh.info/. Enter 0476 [id number] in the search box to learn more about \"Preventing Falls: Care Instructions. \"  Current as of: March 16, 2018  Content Version: 11.8  © 5865-7405 [employer]. Care instructions adapted under license by [employer] (which disclaims liability or warranty for this information). If you have questions about a medical condition or this instruction, always ask your healthcare professional. [place] [address] any warranty or liability for your use of this information. Patient Education        Learning About a Closed Head Injury  What is a closed head injury? A closed head injury happens when your head gets hit hard. The strong force of the blow causes your brain to shake in your skull. This movement can cause the brain to bruise, swell, or tear. Sometimes nerves or blood vessels also get damaged. This can cause bleeding in or around the brain. A concussion is a type of closed head injury. What are the symptoms? If you have a mild concussion, you may have a mild headache or feel \"not quite right. \" These symptoms are common. They usually go away over a few days to 4 weeks. But sometimes after a concussion, you feel like you can't function as well as before the injury. And you have new symptoms. This is called postconcussive syndrome. You may:  · Find it harder to solve problems, think, concentrate, or remember. · Have headaches. · Have changes in your sleep patterns, such as not being able to sleep or sleeping all the time.   · Have changes in your personality. · Not be interested in your usual activities. · Feel angry or anxious without a clear reason. · Lose your sense of taste or smell. · Be dizzy, lightheaded, or unsteady. It may be hard to stand or walk. How is a closed head injury treated? Any person who may have a concussion needs to see a doctor. Some people have to stay in the hospital to be watched. Others can go home safely. If you go home, follow your doctor's instructions. He or she will tell you if you need someone to watch you closely for the next 24 hours or longer. Rest is the best treatment. Get plenty of sleep at night. And try to rest during the day. · Avoid activities that are physically or mentally demanding. These include housework, exercise, and schoolwork. And don't play video games, send text messages, or use the computer. You may need to change your school or work schedule to be able to avoid these activities. · Ask your doctor when it's okay to drive, ride a bike, or operate machinery. · Take an over-the-counter pain medicine, such as acetaminophen (Tylenol), ibuprofen (Advil, Motrin), or naproxen (Aleve). Be safe with medicines. Read and follow all instructions on the label. · Check with your doctor before you use any other medicines for pain. · Do not drink alcohol or use illegal drugs. They can slow recovery. They can also increase your risk of getting a second head injury. Follow-up care is a key part of your treatment and safety. Be sure to make and go to all appointments, and call your doctor if you are having problems. It's also a good idea to know your test results and keep a list of the medicines you take. Where can you learn more? Go to http://jazmyn-minh.info/  Enter E235 in the search box to learn more about \"Learning About a Closed Head Injury. \"  Current as of: November 20, 2019               Content Version: 12.5  © 4825-4285 Healthwise, [facility].    Care instructions adapted under license by [employer] (which disclaims liability or warranty for this information). If you have questions about a medical condition or this instruction, always ask your healthcare professional. [address] any warranty or liability for your use of this information. Patient Education        Contusion: Care Instructions  Your Care Instructions     Contusion is the medical term for a bruise. It is the result of a direct blow or an impact, such as a fall. Contusions are common sports injuries. Most people think of a bruise as a black-and-blue spot. This happens when small blood vessels get torn and leak blood under the skin. But bones, muscles, and organs can also get bruised. This may damage deep tissues but not cause a bruise you can see. The doctor will do a physical exam to find the location of your contusion. You may also have tests to make sure you do not have a more serious injury, such as a broken bone or nerve damage. These may include X-rays or other imaging tests like a CT scan or MRI. Deep-tissue contusions may cause pain and swelling. But if there is no serious damage, they will often get better in a few weeks with home treatment. The doctor has checked you carefully, but problems can develop later. If you notice any problems or new symptoms, get medical treatment right away. Follow-up care is a key part of your treatment and safety. Be sure to make and go to all appointments, and call your doctor if you are having problems. It's also a good idea to know your test results and keep a list of the medicines you take. How can you care for yourself at home? · Put ice or a cold pack on the sore area for 10 to 20 minutes at a time to stop swelling. Put a thin cloth between the ice pack and your skin. · Be safe with medicines. Read and follow all instructions on the label.   ? If the doctor gave you a prescription medicine for pain, take it as prescribed. ? If you are not taking a prescription pain medicine, ask your doctor if you can take an over-the-counter medicine. · If you can, prop up the sore area on pillows as much as possible for the next few days. Try to keep the sore area above the level of your heart. When should you call for help? Call your doctor now or seek immediate medical care if:  · Your pain gets worse. · You have new or worse swelling. · You have tingling, weakness, or numbness in the area near the contusion. · The area near the contusion is cold or pale. Watch closely for changes in your health, and be sure to contact your doctor if:  · You do not get better as expected. Where can you learn more? Go to http://jazmyn-minh.info/  Enter [id number] in the search box to learn more about \"Contusion: Care Instructions. \"  Current as of: June 26, 2019               Content Version: 12.5  © 9651-7864 Healthwise[employer]. Care instructions adapted under license by [employer] (which disclaims liability or warranty for this information). If you have questions about a medical condition or this instruction, always ask your healthcare professional. [address] any warranty or liability for your use of this information.

## 2020-06-01 ENCOUNTER — HOME HEALTH ADMISSION (OUTPATIENT)
Dept: HOME HEALTH SERVICES | Facility: HOME HEALTH | Age: 85
End: 2020-06-01
Payer: MEDICARE

## 2020-06-01 LAB
ATRIAL RATE: 66 BPM
CALCULATED R AXIS, ECG10: -28 DEGREES
CALCULATED T AXIS, ECG11: 40 DEGREES
DIAGNOSIS, 93000: NORMAL
Q-T INTERVAL, ECG07: 392 MS
QRS DURATION, ECG06: 80 MS
QTC CALCULATION (BEZET), ECG08: 410 MS
VENTRICULAR RATE, ECG03: 66 BPM

## 2020-06-01 NOTE — PROGRESS NOTES
CM received information that patient had been seen in the ED on 5/30/2020 and would benefit from [address]. CM called the patient's home and spoke with son and mPOA, Kathy Patrick, to inquire about issue and patient being open to [facility] services. Son indicated that they were open to having [facility] agency come to work with patient. Offered [place] [facility] and emailed agency list and ratings to [email or web address] for review. Prior to getting off the phone, son stated that he would want referral sent to [facility] and would look through the list for another provider in case [facility] could not provide services. Order and referral submitted to [facility]. Sakshi Bray, [address] - [address] [place]  [employer] ACP Facilitator  Zone Phone: [phone]      Mobile: [phone]

## 2020-06-03 ENCOUNTER — HOME CARE VISIT (OUTPATIENT)
Dept: HOME HEALTH SERVICES | Facility: HOME HEALTH | Age: 85
End: 2020-06-03
Payer: MEDICARE

## 2020-06-03 ENCOUNTER — HOME CARE VISIT (OUTPATIENT)
Dept: SCHEDULING | Facility: HOME HEALTH | Age: 85
End: 2020-06-03
Payer: MEDICARE

## 2020-06-03 VITALS
TEMPERATURE: 97.3 F | OXYGEN SATURATION: 96 % | HEART RATE: 60 BPM | RESPIRATION RATE: 16 BRPM | DIASTOLIC BLOOD PRESSURE: 50 MMHG | SYSTOLIC BLOOD PRESSURE: 110 MMHG

## 2020-06-03 PROCEDURE — 3331090001 HH PPS REVENUE CREDIT

## 2020-06-03 PROCEDURE — 3331090002 HH PPS REVENUE DEBIT

## 2020-06-03 PROCEDURE — G0151 HHCP-SERV OF PT,EA 15 MIN: HCPCS

## 2020-06-03 PROCEDURE — 400013 HH SOC

## 2020-06-04 ENCOUNTER — HOME CARE VISIT (OUTPATIENT)
Dept: SCHEDULING | Facility: HOME HEALTH | Age: 85
End: 2020-06-04
Payer: MEDICARE

## 2020-06-04 VITALS
HEART RATE: 71 BPM | DIASTOLIC BLOOD PRESSURE: 56 MMHG | SYSTOLIC BLOOD PRESSURE: 112 MMHG | TEMPERATURE: 97.7 F | BODY MASS INDEX: 28.99 KG/M2 | RESPIRATION RATE: 16 BRPM | OXYGEN SATURATION: 97 % | HEIGHT: 65 IN | WEIGHT: 174 LBS

## 2020-06-04 PROCEDURE — 3331090002 HH PPS REVENUE DEBIT

## 2020-06-04 PROCEDURE — A6216 NON-STERILE GAUZE<=16 SQ IN: HCPCS

## 2020-06-04 PROCEDURE — A6260 WOUND CLEANSER ANY TYPE/SIZE: HCPCS

## 2020-06-04 PROCEDURE — 3331090001 HH PPS REVENUE CREDIT

## 2020-06-04 PROCEDURE — G0299 HHS/HOSPICE OF RN EA 15 MIN: HCPCS

## 2020-06-04 PROCEDURE — A6446 CONFORM BAND S W>=3" <5"/YD: HCPCS

## 2020-06-04 PROCEDURE — A6223 GAUZE >16<=48 NO W/SAL W/O B: HCPCS

## 2020-06-05 ENCOUNTER — HOME CARE VISIT (OUTPATIENT)
Dept: SCHEDULING | Facility: HOME HEALTH | Age: 85
End: 2020-06-05
Payer: MEDICARE

## 2020-06-05 ENCOUNTER — HOME CARE VISIT (OUTPATIENT)
Dept: HOME HEALTH SERVICES | Facility: HOME HEALTH | Age: 85
End: 2020-06-05
Payer: MEDICARE

## 2020-06-05 PROCEDURE — 3331090001 HH PPS REVENUE CREDIT

## 2020-06-05 PROCEDURE — G0151 HHCP-SERV OF PT,EA 15 MIN: HCPCS

## 2020-06-05 PROCEDURE — 3331090002 HH PPS REVENUE DEBIT

## 2020-06-06 ENCOUNTER — HOME CARE VISIT (OUTPATIENT)
Dept: SCHEDULING | Facility: HOME HEALTH | Age: 85
End: 2020-06-06
Payer: MEDICARE

## 2020-06-06 PROCEDURE — 3331090001 HH PPS REVENUE CREDIT

## 2020-06-06 PROCEDURE — 3331090002 HH PPS REVENUE DEBIT

## 2020-06-06 PROCEDURE — G0300 HHS/HOSPICE OF LPN EA 15 MIN: HCPCS

## 2020-06-07 PROCEDURE — 3331090001 HH PPS REVENUE CREDIT

## 2020-06-07 PROCEDURE — 3331090002 HH PPS REVENUE DEBIT

## 2020-06-08 ENCOUNTER — HOME CARE VISIT (OUTPATIENT)
Dept: SCHEDULING | Facility: HOME HEALTH | Age: 85
End: 2020-06-08
Payer: MEDICARE

## 2020-06-08 VITALS
TEMPERATURE: 98 F | DIASTOLIC BLOOD PRESSURE: 70 MMHG | RESPIRATION RATE: 18 BRPM | SYSTOLIC BLOOD PRESSURE: 110 MMHG | HEART RATE: 51 BPM | OXYGEN SATURATION: 94 %

## 2020-06-08 VITALS
HEART RATE: 58 BPM | SYSTOLIC BLOOD PRESSURE: 100 MMHG | DIASTOLIC BLOOD PRESSURE: 50 MMHG | TEMPERATURE: 98 F | OXYGEN SATURATION: 96 % | RESPIRATION RATE: 16 BRPM

## 2020-06-08 PROCEDURE — 3331090001 HH PPS REVENUE CREDIT

## 2020-06-08 PROCEDURE — G0299 HHS/HOSPICE OF RN EA 15 MIN: HCPCS

## 2020-06-08 PROCEDURE — 3331090002 HH PPS REVENUE DEBIT

## 2020-06-09 VITALS
SYSTOLIC BLOOD PRESSURE: 92 MMHG | OXYGEN SATURATION: 97 % | DIASTOLIC BLOOD PRESSURE: 52 MMHG | HEART RATE: 54 BPM | TEMPERATURE: 97.4 F

## 2020-06-09 PROCEDURE — 3331090002 HH PPS REVENUE DEBIT

## 2020-06-09 PROCEDURE — 3331090001 HH PPS REVENUE CREDIT

## 2020-06-10 ENCOUNTER — HOME CARE VISIT (OUTPATIENT)
Dept: HOME HEALTH SERVICES | Facility: HOME HEALTH | Age: 85
End: 2020-06-10
Payer: MEDICARE

## 2020-06-10 ENCOUNTER — TELEPHONE (OUTPATIENT)
Dept: NEUROLOGY | Age: 85
End: 2020-06-10

## 2020-06-10 ENCOUNTER — HOME CARE VISIT (OUTPATIENT)
Dept: SCHEDULING | Facility: HOME HEALTH | Age: 85
End: 2020-06-10
Payer: MEDICARE

## 2020-06-10 ENCOUNTER — VIRTUAL VISIT (OUTPATIENT)
Dept: NEUROLOGY | Age: 85
End: 2020-06-10

## 2020-06-10 PROCEDURE — 3331090001 HH PPS REVENUE CREDIT

## 2020-06-10 PROCEDURE — G0151 HHCP-SERV OF PT,EA 15 MIN: HCPCS

## 2020-06-10 PROCEDURE — 3331090002 HH PPS REVENUE DEBIT

## 2020-06-11 ENCOUNTER — HOME CARE VISIT (OUTPATIENT)
Dept: SCHEDULING | Facility: HOME HEALTH | Age: 85
End: 2020-06-11
Payer: MEDICARE

## 2020-06-11 VITALS
RESPIRATION RATE: 18 BRPM | DIASTOLIC BLOOD PRESSURE: 60 MMHG | TEMPERATURE: 97.7 F | OXYGEN SATURATION: 93 % | HEART RATE: 70 BPM | SYSTOLIC BLOOD PRESSURE: 102 MMHG

## 2020-06-11 VITALS
TEMPERATURE: 98 F | DIASTOLIC BLOOD PRESSURE: 68 MMHG | HEART RATE: 54 BPM | SYSTOLIC BLOOD PRESSURE: 100 MMHG | OXYGEN SATURATION: 99 % | RESPIRATION RATE: 18 BRPM

## 2020-06-11 PROCEDURE — 3331090002 HH PPS REVENUE DEBIT

## 2020-06-11 PROCEDURE — G0300 HHS/HOSPICE OF LPN EA 15 MIN: HCPCS

## 2020-06-11 PROCEDURE — 3331090001 HH PPS REVENUE CREDIT

## 2020-06-12 ENCOUNTER — HOME CARE VISIT (OUTPATIENT)
Dept: HOME HEALTH SERVICES | Facility: HOME HEALTH | Age: 85
End: 2020-06-12
Payer: MEDICARE

## 2020-06-12 ENCOUNTER — HOME CARE VISIT (OUTPATIENT)
Dept: SCHEDULING | Facility: HOME HEALTH | Age: 85
End: 2020-06-12
Payer: MEDICARE

## 2020-06-12 PROCEDURE — 3331090001 HH PPS REVENUE CREDIT

## 2020-06-12 PROCEDURE — 3331090002 HH PPS REVENUE DEBIT

## 2020-06-12 PROCEDURE — G0151 HHCP-SERV OF PT,EA 15 MIN: HCPCS

## 2020-06-13 VITALS
SYSTOLIC BLOOD PRESSURE: 100 MMHG | DIASTOLIC BLOOD PRESSURE: 60 MMHG | OXYGEN SATURATION: 97 % | TEMPERATURE: 97.8 F | RESPIRATION RATE: 18 BRPM | HEART RATE: 50 BPM

## 2020-06-13 PROCEDURE — 3331090002 HH PPS REVENUE DEBIT

## 2020-06-13 PROCEDURE — 3331090001 HH PPS REVENUE CREDIT

## 2020-06-14 PROCEDURE — 3331090002 HH PPS REVENUE DEBIT

## 2020-06-14 PROCEDURE — 3331090001 HH PPS REVENUE CREDIT

## 2020-06-15 PROCEDURE — 3331090001 HH PPS REVENUE CREDIT

## 2020-06-15 PROCEDURE — 3331090002 HH PPS REVENUE DEBIT

## 2020-06-16 ENCOUNTER — HOME CARE VISIT (OUTPATIENT)
Dept: SCHEDULING | Facility: HOME HEALTH | Age: 85
End: 2020-06-16
Payer: MEDICARE

## 2020-06-16 ENCOUNTER — HOME CARE VISIT (OUTPATIENT)
Dept: HOME HEALTH SERVICES | Facility: HOME HEALTH | Age: 85
End: 2020-06-16
Payer: MEDICARE

## 2020-06-16 PROCEDURE — 3331090001 HH PPS REVENUE CREDIT

## 2020-06-16 PROCEDURE — 3331090002 HH PPS REVENUE DEBIT

## 2020-06-16 PROCEDURE — G0151 HHCP-SERV OF PT,EA 15 MIN: HCPCS

## 2020-06-17 VITALS
RESPIRATION RATE: 18 BRPM | HEART RATE: 52 BPM | OXYGEN SATURATION: 96 % | DIASTOLIC BLOOD PRESSURE: 60 MMHG | SYSTOLIC BLOOD PRESSURE: 100 MMHG | TEMPERATURE: 97.6 F

## 2020-06-17 PROCEDURE — 3331090001 HH PPS REVENUE CREDIT

## 2020-06-17 PROCEDURE — 3331090002 HH PPS REVENUE DEBIT

## 2020-06-18 PROCEDURE — 3331090001 HH PPS REVENUE CREDIT

## 2020-06-18 PROCEDURE — 3331090002 HH PPS REVENUE DEBIT

## 2020-06-18 RX ORDER — EZETIMIBE 10 MG/1
TABLET ORAL
Qty: 90 TAB | Refills: 1 | Status: SHIPPED | OUTPATIENT
Start: 2020-06-18 | End: 2020-08-26

## 2020-06-18 RX ORDER — AMLODIPINE BESYLATE 5 MG/1
TABLET ORAL
Qty: 180 TAB | Refills: 1 | Status: SHIPPED | OUTPATIENT
Start: 2020-06-18 | End: 2020-11-11 | Stop reason: SDUPTHER

## 2020-06-19 ENCOUNTER — HOME CARE VISIT (OUTPATIENT)
Dept: SCHEDULING | Facility: HOME HEALTH | Age: 85
End: 2020-06-19
Payer: MEDICARE

## 2020-06-19 ENCOUNTER — HOME CARE VISIT (OUTPATIENT)
Dept: HOME HEALTH SERVICES | Facility: HOME HEALTH | Age: 85
End: 2020-06-19
Payer: MEDICARE

## 2020-06-19 VITALS
OXYGEN SATURATION: 94 % | TEMPERATURE: 97.7 F | SYSTOLIC BLOOD PRESSURE: 115 MMHG | RESPIRATION RATE: 18 BRPM | DIASTOLIC BLOOD PRESSURE: 60 MMHG

## 2020-06-19 PROCEDURE — G0299 HHS/HOSPICE OF RN EA 15 MIN: HCPCS

## 2020-06-19 PROCEDURE — 3331090001 HH PPS REVENUE CREDIT

## 2020-06-19 PROCEDURE — 3331090002 HH PPS REVENUE DEBIT

## 2020-06-19 PROCEDURE — G0151 HHCP-SERV OF PT,EA 15 MIN: HCPCS

## 2020-06-20 PROCEDURE — 3331090001 HH PPS REVENUE CREDIT

## 2020-06-20 PROCEDURE — 3331090002 HH PPS REVENUE DEBIT

## 2020-06-21 PROCEDURE — 3331090001 HH PPS REVENUE CREDIT

## 2020-06-21 PROCEDURE — 3331090002 HH PPS REVENUE DEBIT

## 2020-06-22 VITALS
OXYGEN SATURATION: 95 % | SYSTOLIC BLOOD PRESSURE: 100 MMHG | HEART RATE: 60 BPM | DIASTOLIC BLOOD PRESSURE: 60 MMHG | TEMPERATURE: 97.7 F | RESPIRATION RATE: 18 BRPM

## 2020-06-22 PROCEDURE — 3331090001 HH PPS REVENUE CREDIT

## 2020-06-22 PROCEDURE — 3331090002 HH PPS REVENUE DEBIT

## 2020-06-23 ENCOUNTER — HOME CARE VISIT (OUTPATIENT)
Dept: SCHEDULING | Facility: HOME HEALTH | Age: 85
End: 2020-06-23
Payer: MEDICARE

## 2020-06-23 ENCOUNTER — HOME CARE VISIT (OUTPATIENT)
Dept: HOME HEALTH SERVICES | Facility: HOME HEALTH | Age: 85
End: 2020-06-23
Payer: MEDICARE

## 2020-06-23 VITALS
TEMPERATURE: 98 F | SYSTOLIC BLOOD PRESSURE: 110 MMHG | HEART RATE: 54 BPM | OXYGEN SATURATION: 95 % | RESPIRATION RATE: 20 BRPM | DIASTOLIC BLOOD PRESSURE: 60 MMHG

## 2020-06-23 PROCEDURE — G0299 HHS/HOSPICE OF RN EA 15 MIN: HCPCS

## 2020-06-23 PROCEDURE — 3331090002 HH PPS REVENUE DEBIT

## 2020-06-23 PROCEDURE — 3331090001 HH PPS REVENUE CREDIT

## 2020-06-24 ENCOUNTER — HOME CARE VISIT (OUTPATIENT)
Dept: HOME HEALTH SERVICES | Facility: HOME HEALTH | Age: 85
End: 2020-06-24
Payer: MEDICARE

## 2020-06-24 ENCOUNTER — HOME CARE VISIT (OUTPATIENT)
Dept: SCHEDULING | Facility: HOME HEALTH | Age: 85
End: 2020-06-24
Payer: MEDICARE

## 2020-06-24 PROCEDURE — 3331090001 HH PPS REVENUE CREDIT

## 2020-06-24 PROCEDURE — 3331090002 HH PPS REVENUE DEBIT

## 2020-06-24 PROCEDURE — G0157 HHC PT ASSISTANT EA 15: HCPCS

## 2020-06-25 VITALS
TEMPERATURE: 97.9 F | OXYGEN SATURATION: 95 % | DIASTOLIC BLOOD PRESSURE: 53 MMHG | SYSTOLIC BLOOD PRESSURE: 112 MMHG | HEART RATE: 53 BPM

## 2020-06-25 PROCEDURE — 3331090002 HH PPS REVENUE DEBIT

## 2020-06-25 PROCEDURE — 3331090001 HH PPS REVENUE CREDIT

## 2020-06-26 ENCOUNTER — HOME CARE VISIT (OUTPATIENT)
Dept: SCHEDULING | Facility: HOME HEALTH | Age: 85
End: 2020-06-26
Payer: MEDICARE

## 2020-06-26 PROCEDURE — 3331090002 HH PPS REVENUE DEBIT

## 2020-06-26 PROCEDURE — 3331090001 HH PPS REVENUE CREDIT

## 2020-06-26 PROCEDURE — G0157 HHC PT ASSISTANT EA 15: HCPCS

## 2020-06-27 PROCEDURE — 3331090002 HH PPS REVENUE DEBIT

## 2020-06-27 PROCEDURE — 3331090001 HH PPS REVENUE CREDIT

## 2020-06-28 PROCEDURE — 3331090001 HH PPS REVENUE CREDIT

## 2020-06-28 PROCEDURE — 3331090002 HH PPS REVENUE DEBIT

## 2020-06-29 VITALS
OXYGEN SATURATION: 94 % | SYSTOLIC BLOOD PRESSURE: 112 MMHG | RESPIRATION RATE: 17 BRPM | DIASTOLIC BLOOD PRESSURE: 50 MMHG | TEMPERATURE: 98.2 F | HEART RATE: 67 BPM

## 2020-06-29 PROCEDURE — 3331090001 HH PPS REVENUE CREDIT

## 2020-06-29 PROCEDURE — 3331090002 HH PPS REVENUE DEBIT

## 2020-06-29 RX ORDER — ISOSORBIDE MONONITRATE 20 MG/1
TABLET ORAL
Qty: 60 TAB | Refills: 5 | Status: SHIPPED | OUTPATIENT
Start: 2020-06-29 | End: 2020-09-15 | Stop reason: SDUPTHER

## 2020-06-30 ENCOUNTER — HOME CARE VISIT (OUTPATIENT)
Dept: SCHEDULING | Facility: HOME HEALTH | Age: 85
End: 2020-06-30
Payer: MEDICARE

## 2020-06-30 ENCOUNTER — HOME CARE VISIT (OUTPATIENT)
Dept: HOME HEALTH SERVICES | Facility: HOME HEALTH | Age: 85
End: 2020-06-30
Payer: MEDICARE

## 2020-06-30 VITALS
RESPIRATION RATE: 20 BRPM | SYSTOLIC BLOOD PRESSURE: 106 MMHG | TEMPERATURE: 98.2 F | HEART RATE: 52 BPM | DIASTOLIC BLOOD PRESSURE: 60 MMHG | OXYGEN SATURATION: 95 %

## 2020-06-30 VITALS
SYSTOLIC BLOOD PRESSURE: 110 MMHG | TEMPERATURE: 97.1 F | DIASTOLIC BLOOD PRESSURE: 60 MMHG | RESPIRATION RATE: 16 BRPM | OXYGEN SATURATION: 96 % | HEART RATE: 60 BPM

## 2020-06-30 PROCEDURE — 3331090001 HH PPS REVENUE CREDIT

## 2020-06-30 PROCEDURE — G0299 HHS/HOSPICE OF RN EA 15 MIN: HCPCS

## 2020-06-30 PROCEDURE — G0151 HHCP-SERV OF PT,EA 15 MIN: HCPCS

## 2020-06-30 PROCEDURE — 3331090002 HH PPS REVENUE DEBIT

## 2020-07-01 PROCEDURE — 3331090002 HH PPS REVENUE DEBIT

## 2020-07-01 PROCEDURE — 3331090001 HH PPS REVENUE CREDIT

## 2020-07-02 ENCOUNTER — HOME CARE VISIT (OUTPATIENT)
Dept: SCHEDULING | Facility: HOME HEALTH | Age: 85
End: 2020-07-02
Payer: MEDICARE

## 2020-07-02 ENCOUNTER — HOME CARE VISIT (OUTPATIENT)
Dept: HOME HEALTH SERVICES | Facility: HOME HEALTH | Age: 85
End: 2020-07-02
Payer: MEDICARE

## 2020-07-02 PROCEDURE — 3331090001 HH PPS REVENUE CREDIT

## 2020-07-02 PROCEDURE — G0151 HHCP-SERV OF PT,EA 15 MIN: HCPCS

## 2020-07-02 PROCEDURE — 3331090002 HH PPS REVENUE DEBIT

## 2020-07-03 PROCEDURE — 3331090001 HH PPS REVENUE CREDIT

## 2020-07-03 PROCEDURE — 3331090002 HH PPS REVENUE DEBIT

## 2020-07-04 PROCEDURE — 3331090001 HH PPS REVENUE CREDIT

## 2020-07-04 PROCEDURE — 3331090002 HH PPS REVENUE DEBIT

## 2020-07-05 VITALS
SYSTOLIC BLOOD PRESSURE: 120 MMHG | TEMPERATURE: 97.7 F | RESPIRATION RATE: 18 BRPM | DIASTOLIC BLOOD PRESSURE: 60 MMHG | HEART RATE: 56 BPM | OXYGEN SATURATION: 94 %

## 2020-07-05 PROCEDURE — 3331090001 HH PPS REVENUE CREDIT

## 2020-07-05 PROCEDURE — 3331090002 HH PPS REVENUE DEBIT

## 2020-07-06 PROCEDURE — 3331090002 HH PPS REVENUE DEBIT

## 2020-07-06 PROCEDURE — 3331090001 HH PPS REVENUE CREDIT

## 2020-07-07 PROCEDURE — 3331090001 HH PPS REVENUE CREDIT

## 2020-07-07 PROCEDURE — 3331090002 HH PPS REVENUE DEBIT

## 2020-07-08 ENCOUNTER — HOME CARE VISIT (OUTPATIENT)
Dept: SCHEDULING | Facility: HOME HEALTH | Age: 85
End: 2020-07-08
Payer: MEDICARE

## 2020-07-08 ENCOUNTER — HOME CARE VISIT (OUTPATIENT)
Dept: HOME HEALTH SERVICES | Facility: HOME HEALTH | Age: 85
End: 2020-07-08
Payer: MEDICARE

## 2020-07-08 VITALS
RESPIRATION RATE: 20 BRPM | HEART RATE: 58 BPM | OXYGEN SATURATION: 96 % | SYSTOLIC BLOOD PRESSURE: 120 MMHG | DIASTOLIC BLOOD PRESSURE: 60 MMHG | TEMPERATURE: 98.2 F

## 2020-07-08 VITALS
HEART RATE: 61 BPM | TEMPERATURE: 97.9 F | RESPIRATION RATE: 18 BRPM | SYSTOLIC BLOOD PRESSURE: 105 MMHG | OXYGEN SATURATION: 94 % | DIASTOLIC BLOOD PRESSURE: 55 MMHG

## 2020-07-08 PROCEDURE — G0299 HHS/HOSPICE OF RN EA 15 MIN: HCPCS

## 2020-07-08 PROCEDURE — 400013 HH SOC

## 2020-07-08 PROCEDURE — G0151 HHCP-SERV OF PT,EA 15 MIN: HCPCS

## 2020-07-08 PROCEDURE — 3331090002 HH PPS REVENUE DEBIT

## 2020-07-08 PROCEDURE — 3331090001 HH PPS REVENUE CREDIT

## 2020-07-09 PROCEDURE — 3331090002 HH PPS REVENUE DEBIT

## 2020-07-09 PROCEDURE — 3331090001 HH PPS REVENUE CREDIT

## 2020-07-10 ENCOUNTER — HOME CARE VISIT (OUTPATIENT)
Dept: HOME HEALTH SERVICES | Facility: HOME HEALTH | Age: 85
End: 2020-07-10
Payer: MEDICARE

## 2020-07-10 ENCOUNTER — HOME CARE VISIT (OUTPATIENT)
Dept: SCHEDULING | Facility: HOME HEALTH | Age: 85
End: 2020-07-10
Payer: MEDICARE

## 2020-07-10 PROCEDURE — 3331090002 HH PPS REVENUE DEBIT

## 2020-07-10 PROCEDURE — 3331090001 HH PPS REVENUE CREDIT

## 2020-07-10 PROCEDURE — G0157 HHC PT ASSISTANT EA 15: HCPCS

## 2020-07-11 PROCEDURE — 3331090001 HH PPS REVENUE CREDIT

## 2020-07-11 PROCEDURE — 3331090002 HH PPS REVENUE DEBIT

## 2020-07-12 VITALS
SYSTOLIC BLOOD PRESSURE: 115 MMHG | OXYGEN SATURATION: 95 % | DIASTOLIC BLOOD PRESSURE: 60 MMHG | TEMPERATURE: 97.3 F | HEART RATE: 61 BPM

## 2020-07-12 PROCEDURE — 3331090002 HH PPS REVENUE DEBIT

## 2020-07-12 PROCEDURE — 3331090001 HH PPS REVENUE CREDIT

## 2020-07-13 PROCEDURE — 3331090001 HH PPS REVENUE CREDIT

## 2020-07-13 PROCEDURE — 3331090002 HH PPS REVENUE DEBIT

## 2020-07-14 PROCEDURE — 3331090002 HH PPS REVENUE DEBIT

## 2020-07-14 PROCEDURE — 3331090001 HH PPS REVENUE CREDIT

## 2020-07-15 ENCOUNTER — HOME CARE VISIT (OUTPATIENT)
Dept: HOME HEALTH SERVICES | Facility: HOME HEALTH | Age: 85
End: 2020-07-15
Payer: MEDICARE

## 2020-07-15 ENCOUNTER — HOME CARE VISIT (OUTPATIENT)
Dept: SCHEDULING | Facility: HOME HEALTH | Age: 85
End: 2020-07-15
Payer: MEDICARE

## 2020-07-15 VITALS
SYSTOLIC BLOOD PRESSURE: 110 MMHG | DIASTOLIC BLOOD PRESSURE: 60 MMHG | HEART RATE: 51 BPM | RESPIRATION RATE: 20 BRPM | TEMPERATURE: 97.3 F | OXYGEN SATURATION: 96 %

## 2020-07-15 VITALS
OXYGEN SATURATION: 94 % | SYSTOLIC BLOOD PRESSURE: 120 MMHG | DIASTOLIC BLOOD PRESSURE: 54 MMHG | TEMPERATURE: 97 F | HEART RATE: 53 BPM | RESPIRATION RATE: 18 BRPM

## 2020-07-15 PROCEDURE — 3331090002 HH PPS REVENUE DEBIT

## 2020-07-15 PROCEDURE — G0151 HHCP-SERV OF PT,EA 15 MIN: HCPCS

## 2020-07-15 PROCEDURE — 3331090001 HH PPS REVENUE CREDIT

## 2020-07-15 PROCEDURE — G0299 HHS/HOSPICE OF RN EA 15 MIN: HCPCS

## 2020-07-16 PROCEDURE — 3331090001 HH PPS REVENUE CREDIT

## 2020-07-16 PROCEDURE — 3331090002 HH PPS REVENUE DEBIT

## 2020-07-17 ENCOUNTER — HOME CARE VISIT (OUTPATIENT)
Dept: HOME HEALTH SERVICES | Facility: HOME HEALTH | Age: 85
End: 2020-07-17
Payer: MEDICARE

## 2020-07-17 ENCOUNTER — HOME CARE VISIT (OUTPATIENT)
Dept: SCHEDULING | Facility: HOME HEALTH | Age: 85
End: 2020-07-17
Payer: MEDICARE

## 2020-07-17 PROCEDURE — 3331090001 HH PPS REVENUE CREDIT

## 2020-07-17 PROCEDURE — 3331090002 HH PPS REVENUE DEBIT

## 2020-07-17 PROCEDURE — G0151 HHCP-SERV OF PT,EA 15 MIN: HCPCS

## 2020-07-18 PROCEDURE — 3331090001 HH PPS REVENUE CREDIT

## 2020-07-18 PROCEDURE — 3331090002 HH PPS REVENUE DEBIT

## 2020-07-19 VITALS
DIASTOLIC BLOOD PRESSURE: 80 MMHG | TEMPERATURE: 98.3 F | OXYGEN SATURATION: 94 % | HEART RATE: 72 BPM | RESPIRATION RATE: 18 BRPM | SYSTOLIC BLOOD PRESSURE: 122 MMHG

## 2020-07-19 PROCEDURE — 3331090002 HH PPS REVENUE DEBIT

## 2020-07-19 PROCEDURE — 3331090001 HH PPS REVENUE CREDIT

## 2020-07-20 PROCEDURE — 3331090001 HH PPS REVENUE CREDIT

## 2020-07-20 PROCEDURE — 3331090002 HH PPS REVENUE DEBIT

## 2020-07-21 PROCEDURE — 3331090001 HH PPS REVENUE CREDIT

## 2020-07-21 PROCEDURE — 3331090002 HH PPS REVENUE DEBIT

## 2020-07-22 ENCOUNTER — HOME CARE VISIT (OUTPATIENT)
Dept: SCHEDULING | Facility: HOME HEALTH | Age: 85
End: 2020-07-22
Payer: MEDICARE

## 2020-07-22 ENCOUNTER — HOME CARE VISIT (OUTPATIENT)
Dept: HOME HEALTH SERVICES | Facility: HOME HEALTH | Age: 85
End: 2020-07-22
Payer: MEDICARE

## 2020-07-22 VITALS
RESPIRATION RATE: 18 BRPM | DIASTOLIC BLOOD PRESSURE: 51 MMHG | SYSTOLIC BLOOD PRESSURE: 100 MMHG | HEART RATE: 58 BPM | OXYGEN SATURATION: 93 % | TEMPERATURE: 97.9 F

## 2020-07-22 VITALS
OXYGEN SATURATION: 96 % | HEART RATE: 54 BPM | DIASTOLIC BLOOD PRESSURE: 60 MMHG | SYSTOLIC BLOOD PRESSURE: 110 MMHG | RESPIRATION RATE: 20 BRPM | TEMPERATURE: 98.1 F

## 2020-07-22 PROCEDURE — 3331090001 HH PPS REVENUE CREDIT

## 2020-07-22 PROCEDURE — G0151 HHCP-SERV OF PT,EA 15 MIN: HCPCS

## 2020-07-22 PROCEDURE — G0299 HHS/HOSPICE OF RN EA 15 MIN: HCPCS

## 2020-07-22 PROCEDURE — 3331090002 HH PPS REVENUE DEBIT

## 2020-07-23 PROCEDURE — 3331090001 HH PPS REVENUE CREDIT

## 2020-07-23 PROCEDURE — 3331090002 HH PPS REVENUE DEBIT

## 2020-07-24 ENCOUNTER — HOME CARE VISIT (OUTPATIENT)
Dept: SCHEDULING | Facility: HOME HEALTH | Age: 85
End: 2020-07-24
Payer: MEDICARE

## 2020-07-24 ENCOUNTER — HOME CARE VISIT (OUTPATIENT)
Dept: HOME HEALTH SERVICES | Facility: HOME HEALTH | Age: 85
End: 2020-07-24
Payer: MEDICARE

## 2020-07-24 VITALS
HEART RATE: 86 BPM | TEMPERATURE: 97.1 F | SYSTOLIC BLOOD PRESSURE: 100 MMHG | RESPIRATION RATE: 16 BRPM | DIASTOLIC BLOOD PRESSURE: 60 MMHG | OXYGEN SATURATION: 96 %

## 2020-07-24 PROCEDURE — G0151 HHCP-SERV OF PT,EA 15 MIN: HCPCS

## 2020-07-24 PROCEDURE — 3331090001 HH PPS REVENUE CREDIT

## 2020-07-24 PROCEDURE — 3331090002 HH PPS REVENUE DEBIT

## 2020-07-25 PROCEDURE — 3331090001 HH PPS REVENUE CREDIT

## 2020-07-25 PROCEDURE — 3331090002 HH PPS REVENUE DEBIT

## 2020-07-26 PROCEDURE — 3331090001 HH PPS REVENUE CREDIT

## 2020-07-26 PROCEDURE — 3331090002 HH PPS REVENUE DEBIT

## 2020-08-10 ENCOUNTER — TELEPHONE (OUTPATIENT)
Dept: CARDIOLOGY CLINIC | Age: 85
End: 2020-08-10

## 2020-08-10 NOTE — TELEPHONE ENCOUNTER
Spoke with patient. Verified patient with two patient identifiers. Advised Isosorbide Mon 20 mg already filled BID on 6- with # 60 and 5 refills. Patient verbalized understanding.

## 2020-08-10 NOTE — TELEPHONE ENCOUNTER
Please modify prescription    Prescription needs to read AM and AFTERNOON    For Isosorbide 20 mg.     Thanks  Watson Cho

## 2020-08-26 RX ORDER — EZETIMIBE 10 MG/1
TABLET ORAL
Qty: 30 TAB | Refills: 2 | Status: SHIPPED | OUTPATIENT
Start: 2020-08-26 | End: 2020-11-23

## 2020-09-16 RX ORDER — ISOSORBIDE MONONITRATE 20 MG/1
TABLET ORAL
Qty: 180 TAB | Refills: 0 | Status: SHIPPED | OUTPATIENT
Start: 2020-09-16 | End: 2020-11-11 | Stop reason: SDUPTHER

## 2020-11-06 PROBLEM — E78.00 HIGH CHOLESTEROL: Status: RESOLVED | Noted: 2019-05-08 | Resolved: 2020-11-06

## 2020-11-06 NOTE — PROGRESS NOTES
Tricia Cueva MD          NAME:  Sophie Bolivar   :   1929   MRN:   [id number]   PCP:  Peggy Vargas MD           Subjective: The patient is a 80y.o. year old male  who returns for a routine follow-up. Last seen by us in . Remains in usual state of health. In clinic today with son. Denies any exertional symptoms. He denies chest pain, edema, medication intolerance, palpitations, shortness of breath, PND/orthopnea wheezing, sputum, syncope, dizziness or light headedness. Past Medical History:   Diagnosis Date    Arthritis     SHOULDERS AND SPINE    CAD (coronary artery disease) 1998    MI CARDIAC CATH, STENT X 2    Cancer ([facility] 2008    chronic lymphocytic leukemia    Cancer ([facility] [address]     multiple skin cancers     Chest pain, unspecified     Chronic pain     L 2 LYTIC LESION    Edema     Hypertension     Ill-defined condition     GLUCOMA    MI (myocardial infarction) ([facility] [address] [facility]     Postsurgical percutaneous transluminal coronary angioplasty status 2012    Pre-operative cardiovascular examination     Thyroid disease     HYPO        ICD-10-CM ICD-9-CM    1. Atherosclerosis of native coronary artery of native heart without angina pectoris  I25.10 414.01 AMB POC EKG ROUTINE W/ 12 LEADS, INTER & REP   2. Essential hypertension, benign  I10 401.1    3. Mixed hyperlipidemia  E78.2 272.2    4.  Postsurgical percutaneous transluminal coronary angioplasty status  Z98.61 V45.82       Social History     Tobacco Use    Smoking status: Former Smoker     Packs/day: 0.50     Years: 15.00     Pack years: 7.50     Types: Cigarettes     Last attempt to quit: 1974     Years since quittin.9    Smokeless tobacco: Never Used   Substance Use Topics    Alcohol use: No      Family History   Problem Relation Age of Onset    Diabetes Mother     Heart Disease Father     Heart Attack Father 80    Lung Disease Sister         PULMONARY FIBROSIS    Heart Attack Brother     No Known Problems Sister     Anesth Problems Neg Hx         Review of Systems  Cardiovascular: Negative except as noted in HPI      Objective:       Vitals:    11/11/20 1158 11/11/20 1214   BP: 116/62 110/60   Pulse: 61    Resp: 16    SpO2: 91%    Weight: 172 lb 1.6 oz (78.1 kg)    Height: 5' 5\" (1.651 m)     Body mass index is 28.64 kg/m². General PE  Mental Status - Alert. General Appearance - Not in acute distress. Chest and Lung Exam   Inspection: Accessory muscles - No use of accessory muscles in breathing. Auscultation:   Breath sounds: - Normal.    Cardiovascular   Inspection: Jugular vein - Bilateral - Inspection Normal.  Palpation/Percussion:   Apical Impulse: - Normal.  Auscultation: Rhythm - Regular. Heart Sounds - S1 WNL and S2 WNL. No S3 or S4. Murmurs & Other Heart Sounds: Auscultation of the heart reveals - No Murmurs. Peripheral Vascular   Upper Extremity: Inspection - Bilateral - No Cyanotic nailbeds or Digital clubbing. Lower Extremity:   Palpation: Edema - Bilateral - No edema. Data Review:     EKG -  normal sinus rhythm, unchanged from previous tracings. Allergies reviewed  Allergies   Allergen Reactions    Statins-Hmg-Coa Reductase Inhibitors Myalgia and Other (comments)     Causes aching, generalized       Medications reviewed  Current Outpatient Medications   Medication Sig    amLODIPine (NORVASC) 5 mg tablet TAKE 1 TABLET BY MOUTH TWICE A DAY    isosorbide mononitrate (ISMO, MONOKET) 20 mg tablet Take one tablet by mouth in the morning, one tablet at mid-day    lisinopriL (PRINIVIL, ZESTRIL) 20 mg tablet Take 1 Tab by mouth daily.  ezetimibe (ZETIA) 10 mg tablet TAKE 1 TABLET BY MOUTH EVERY EVENING    donepezil (ARICEPT) 5 mg tablet TAKE 1 TABLET BY MOUTH EVERYDAY AT BEDTIME    aspirin delayed-release 81 mg tablet Take 81 mg by mouth daily.     fluticasone (FLONASE) 50 mcg/actuation nasal spray 1 spray each nostril daily (Patient taking differently: 1 Spray by Both Nostrils route as needed. 1 spray each nostril daily)    LUMIGAN 0.01 % ophthalmic drops INSTILL 1 DROP INTO BOTH EYES NIGHTLY    HYDROcodone-acetaminophen (NORCO) 7.5-325 mg per tablet Take 2 Tabs by mouth every six (6) hours as needed for Pain for up to 30 doses. Max Daily Amount: 8 Tabs. Indications: Pain    nitroglycerin (NITROSTAT) 0.4 mg SL tablet 1 Tab by SubLINGual route every five (5) minutes as needed for Chest Pain (max 3 tabs daily for chest pain). Up to 3 doses.  cholecalciferol (VITAMIN D3) 1,000 unit tablet Take 5,000 Units by mouth daily (with breakfast).  timolol (TIMOPTIC) 0.5 % ophthalmic solution Administer 1 Drop to both eyes two (2) times a day.  bimatoprost (LUMIGAN) 0.03 % ophthalmic drops Administer 1 Drop to both eyes nightly.  acetaminophen (TYLENOL EXTRA STRENGTH) 500 mg tablet Take 500 mg by mouth every six (6) hours as needed for Pain.  brinzolamide-brimonidine (SIMBRINZA) 1-0.2 % drps Administer 1 Drop to both eyes two (2) times a day.  finasteride (PROSCAR) 5 mg tablet Take 5 mg by mouth daily.  levothyroxine (SYNTHROID) 25 mcg tablet Take 25 mcg by mouth Daily (before breakfast).  silodosin (RAPAFLO) 8 mg capsule Take 8 mg by mouth daily (with breakfast). No current facility-administered medications for this visit. Assessment:       ICD-10-CM ICD-9-CM    1. Atherosclerosis of native coronary artery of native heart without angina pectoris  I25.10 414.01 AMB POC EKG ROUTINE W/ 12 LEADS, INTER & REP   2. Essential hypertension, benign  I10 401.1    3. Mixed hyperlipidemia  E78.2 272.2    4.  Postsurgical percutaneous transluminal coronary angioplasty status  Z98.61 V45.82         Orders Placed This Encounter    AMB POC EKG ROUTINE W/ 12 LEADS, INTER & REP     Order Specific Question:   Reason for Exam:     Answer:   routine    amLODIPine (NORVASC) 5 mg tablet     Sig: TAKE 1 TABLET BY MOUTH TWICE A DAY     Dispense:  180 Tab     Refill:  1 Appt due Nov 2020    isosorbide mononitrate (ISMO, MONOKET) 20 mg tablet     Sig: Take one tablet by mouth in the morning, one tablet at mid-day     Dispense:  180 Tab     Refill:  1     No further refills until seen in the office    lisinopriL (PRINIVIL, ZESTRIL) 20 mg tablet     Sig: Take 1 Tab by mouth daily. Dispense:  30 Tab     Refill:  1     PLEASE SEND TO [facility]       Plan:     Atherosclerosis of the heart:   Remote history of PTCA stenting in 1998  No ischemia on nuclear stress test June 2018. Continue ASA, Imdur, Zetia. HTN:   Controlled with current therapy    Hyperlipidemia:   on Zetia. Unable to tolerate statins d/t myalgias. Lipids and labs managed by PCP.       F/U in 1 year

## 2020-11-11 ENCOUNTER — OFFICE VISIT (OUTPATIENT)
Dept: CARDIOLOGY CLINIC | Age: 85
End: 2020-11-11
Payer: MEDICARE

## 2020-11-11 VITALS
SYSTOLIC BLOOD PRESSURE: 110 MMHG | RESPIRATION RATE: 16 BRPM | HEART RATE: 61 BPM | DIASTOLIC BLOOD PRESSURE: 60 MMHG | OXYGEN SATURATION: 91 % | HEIGHT: 65 IN | WEIGHT: 172.1 LBS | BODY MASS INDEX: 28.67 KG/M2

## 2020-11-11 DIAGNOSIS — I10 ESSENTIAL HYPERTENSION, BENIGN: ICD-10-CM

## 2020-11-11 DIAGNOSIS — I25.10 ATHEROSCLEROSIS OF NATIVE CORONARY ARTERY OF NATIVE HEART WITHOUT ANGINA PECTORIS: Primary | ICD-10-CM

## 2020-11-11 DIAGNOSIS — E78.2 MIXED HYPERLIPIDEMIA: ICD-10-CM

## 2020-11-11 DIAGNOSIS — Z98.61 POSTSURGICAL PERCUTANEOUS TRANSLUMINAL CORONARY ANGIOPLASTY STATUS: ICD-10-CM

## 2020-11-11 PROCEDURE — G8536 NO DOC ELDER MAL SCRN: HCPCS | Performed by: INTERNAL MEDICINE

## 2020-11-11 PROCEDURE — 99213 OFFICE O/P EST LOW 20 MIN: CPT | Performed by: INTERNAL MEDICINE

## 2020-11-11 PROCEDURE — G8419 CALC BMI OUT NRM PARAM NOF/U: HCPCS | Performed by: INTERNAL MEDICINE

## 2020-11-11 PROCEDURE — 1100F PTFALLS ASSESS-DOCD GE2>/YR: CPT | Performed by: INTERNAL MEDICINE

## 2020-11-11 PROCEDURE — 3288F FALL RISK ASSESSMENT DOCD: CPT | Performed by: INTERNAL MEDICINE

## 2020-11-11 PROCEDURE — G8427 DOCREV CUR MEDS BY ELIG CLIN: HCPCS | Performed by: INTERNAL MEDICINE

## 2020-11-11 PROCEDURE — G0463 HOSPITAL OUTPT CLINIC VISIT: HCPCS | Performed by: INTERNAL MEDICINE

## 2020-11-11 PROCEDURE — 93005 ELECTROCARDIOGRAM TRACING: CPT | Performed by: INTERNAL MEDICINE

## 2020-11-11 PROCEDURE — 93010 ELECTROCARDIOGRAM REPORT: CPT | Performed by: INTERNAL MEDICINE

## 2020-11-11 PROCEDURE — G8510 SCR DEP NEG, NO PLAN REQD: HCPCS | Performed by: INTERNAL MEDICINE

## 2020-11-11 RX ORDER — ISOSORBIDE MONONITRATE 20 MG/1
TABLET ORAL
Qty: 180 TAB | Refills: 1 | Status: SHIPPED | OUTPATIENT
Start: 2020-11-11 | End: 2021-06-04

## 2020-11-11 RX ORDER — AMLODIPINE BESYLATE 5 MG/1
TABLET ORAL
Qty: 180 TAB | Refills: 1 | Status: SHIPPED | OUTPATIENT
Start: 2020-11-11 | End: 2021-06-15

## 2020-11-11 RX ORDER — LISINOPRIL 20 MG/1
20 TABLET ORAL DAILY
Qty: 30 TAB | Refills: 1 | Status: SHIPPED | OUTPATIENT
Start: 2020-11-11 | End: 2021-05-06

## 2020-11-11 NOTE — PROGRESS NOTES
1. Have you been to the ER, urgent care clinic since your last visit? Hospitalized since your last visit? No    2. Have you seen or consulted any other health care providers outside of the [address] [place] [address] since your last visit? Include any pap smears or colon screening. No    Chief Complaint   Patient presents with    Cholesterol Problem     annual f/u    Hypertension     annual f/u     Patient denies cardiac symptoms.

## 2020-11-11 NOTE — LETTER
11/18/20 Patient: Chip Gong YOB: 1929 Date of Visit: 11/11/2020 Benjy Hayes MD 
[address] [place] [address] [address] [address] VIA Facsimile: [phone] Dear Benjy Hayes MD, Thank you for referring Mr. Dionicio Adames to [facility] for evaluation. My notes for this consultation are attached. If you have questions, please do not hesitate to call me. I look forward to following your patient along with you.  
 
 
Sincerely, 
 
Katina Lam MD

## 2020-11-23 RX ORDER — EZETIMIBE 10 MG/1
TABLET ORAL
Qty: 30 TAB | Refills: 5 | Status: SHIPPED | OUTPATIENT
Start: 2020-11-23 | End: 2021-05-24

## 2021-03-02 ENCOUNTER — HOSPITAL ENCOUNTER (EMERGENCY)
Age: 86
Discharge: HOME | End: 2021-03-03
Attending: STUDENT IN AN ORGANIZED HEALTH CARE EDUCATION/TRAINING PROGRAM
Payer: MEDICARE

## 2021-03-02 ENCOUNTER — APPOINTMENT (OUTPATIENT)
Dept: CT IMAGING | Age: 86
End: 2021-03-02
Attending: STUDENT IN AN ORGANIZED HEALTH CARE EDUCATION/TRAINING PROGRAM
Payer: MEDICARE

## 2021-03-02 DIAGNOSIS — S01.81XA LACERATION OF FOREHEAD, INITIAL ENCOUNTER: Primary | ICD-10-CM
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
## 2021-03-02 DIAGNOSIS — S09.90XA CLOSED HEAD INJURY, INITIAL ENCOUNTER: ICD-10-CM

## 2021-03-02 PROCEDURE — 93005 ELECTROCARDIOGRAM TRACING: CPT

## 2021-03-02 PROCEDURE — 90471 IMMUNIZATION ADMIN: CPT

## 2021-03-02 PROCEDURE — 99285 EMERGENCY DEPT VISIT HI MDM: CPT

## 2021-03-02 PROCEDURE — 74011000250 HC RX REV CODE- 250: Performed by: STUDENT IN AN ORGANIZED HEALTH CARE EDUCATION/TRAINING PROGRAM

## 2021-03-02 PROCEDURE — 90715 TDAP VACCINE 7 YRS/> IM: CPT | Performed by: STUDENT IN AN ORGANIZED HEALTH CARE EDUCATION/TRAINING PROGRAM

## 2021-03-02 PROCEDURE — 75810000293 HC SIMP/SUPERF WND  RPR

## 2021-03-02 PROCEDURE — 74011250636 HC RX REV CODE- 250/636: Performed by: STUDENT IN AN ORGANIZED HEALTH CARE EDUCATION/TRAINING PROGRAM

## 2021-03-02 PROCEDURE — 70450 CT HEAD/BRAIN W/O DYE: CPT

## 2021-03-02 PROCEDURE — 72125 CT NECK SPINE W/O DYE: CPT

## 2021-03-02 RX ORDER — LIDOCAINE HYDROCHLORIDE AND EPINEPHRINE 20; 5 MG/ML; UG/ML
1.5 INJECTION, SOLUTION EPIDURAL; INFILTRATION; INTRACAUDAL; PERINEURAL ONCE
Status: COMPLETED | OUTPATIENT
Start: 2021-03-02 | End: 2021-03-02

## 2021-03-02 RX ADMIN — TETANUS TOXOID, REDUCED DIPHTHERIA TOXOID AND ACELLULAR PERTUSSIS VACCINE, ADSORBED 0.5 ML: 5; 2.5; 8; 8; 2.5 SUSPENSION INTRAMUSCULAR at 21:47

## 2021-03-02 RX ADMIN — LIDOCAINE HYDROCHLORIDE AND EPINEPHRINE 30 MG: 20; 5 INJECTION, SOLUTION EPIDURAL; INFILTRATION; INTRACAUDAL; PERINEURAL at 21:48

## 2021-03-03 VITALS
SYSTOLIC BLOOD PRESSURE: 98 MMHG | RESPIRATION RATE: 20 BRPM | HEART RATE: 75 BPM | OXYGEN SATURATION: 94 % | DIASTOLIC BLOOD PRESSURE: 50 MMHG | TEMPERATURE: 97.7 F

## 2021-03-03 LAB
ATRIAL RATE: 61 BPM
CALCULATED P AXIS, ECG09: 79 DEGREES
CALCULATED R AXIS, ECG10: -26 DEGREES
CALCULATED T AXIS, ECG11: 46 DEGREES
DIAGNOSIS, 93000: NORMAL
P-R INTERVAL, ECG05: 186 MS
Q-T INTERVAL, ECG07: 402 MS
QRS DURATION, ECG06: 86 MS
QTC CALCULATION (BEZET), ECG08: 404 MS
VENTRICULAR RATE, ECG03: 61 BPM

## 2021-03-03 NOTE — ED NOTES
Assumed care for pt who arrived via EMS with CC of a fall and laceration to the head. Bleeding controlled at this time. Pt reports that he is unsure how he fell, is unsure if he had LOC or if he had hit his head. Pt believes that he had a ground level fall and landed on hands and knees. Pt family at bedside reports that he did not witness the fall but found him on the ground. Pt A&O x3. Pt on monitor x3, VSS and call bell is within reach. MD at bedside assessing patient. Will continue to monitor.

## 2021-03-03 NOTE — ED NOTES
Patient was provided with discharge instructions. Instructions and any medications were reviewed with the patient &/or family by Dr. Zi Akins. Questions and concerns addressed by the provider. Patient discharged in stable condition via wheelchair and was escorted by his son.

## 2021-03-03 NOTE — ED NOTES
Report received from Yoli Mccall RN. They advised of the patient's chief complaint, current status, orders completed (to include IV access/medications/radiology testing), outstanding orders that still need to be completed, and the treatment plan. Questions asked and answered prior to assumption of care.

## 2021-03-03 NOTE — ED PROVIDER NOTES
EMERGENCY DEPARTMENT HISTORY AND PHYSICAL EXAM      Date: 3/2/2021  Patient Name: Isaiah Prasad    History of Presenting Illness     Chief Complaint   Patient presents with   [facility] Laceration       History Provided By: Patient and son    HPI: Isaiah Prasad, 80 y.o. 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 Patient is an overall poor historian, but he thinks that he tried to get up from a chair, using his Rollator to get himself up, but forgot to lock his Rollator, causing it to roll forward pulling him to fall to the ground. Son states that the fall itself was unwitnessed, however, he saw the patient shortly after the falllanding face down on the ground. States that patient was awake and conscious immediately when son arrived to bedside, thus son thinks that patient did not lose consciousness from the fall. Patient himself is unsure about loss of consciousness. Son describes noticing a large laceration over patient's forehead when they turned him over. Son states that his skin is very thin around the forehead from his multiple previous scalp surgeries and skin grafts and likely tore from the pressure of sliding forward on the carpet. Patient takes daily baby ASA, denies all other anticoagulants. Unknown last tetanus status. He currently denies any pain anywhere. Denies headache, vision changes, weakness, numbness, speech deficits, confusion. No neck pain. He denies any chest pain, shortness of breath. No pelvis pain or instability. Denies injury or pain in his extremities. PCP: Boo Purvis MD    No current facility-administered medications on file prior to encounter.       Current Outpatient Medications on File Prior to Encounter   Medication Sig Dispense Refill    ezetimibe (ZETIA) 10 mg tablet TAKE 1 TABLET BY MOUTH EVERY DAY IN THE EVENING 30 Tab 5    amLODIPine (NORVASC) 5 mg tablet TAKE 1 TABLET BY MOUTH TWICE A  Tab 1    isosorbide mononitrate (ISMO, MONOKET) 20 mg tablet Take one tablet by mouth in the morning, one tablet at mid-day 180 Tab 1    lisinopriL (PRINIVIL, ZESTRIL) 20 mg tablet Take 1 Tab by mouth daily. 30 Tab 1    donepezil (ARICEPT) 5 mg tablet TAKE 1 TABLET BY MOUTH EVERYDAY AT BEDTIME  1    aspirin delayed-release 81 mg tablet Take 81 mg by mouth daily.  fluticasone (FLONASE) 50 mcg/actuation nasal spray 1 spray each nostril daily (Patient taking differently: 1 Spray by Both Nostrils route as needed. 1 spray each nostril daily) 1 Bottle 1    LUMIGAN 0.01 % ophthalmic drops INSTILL 1 DROP INTO BOTH EYES NIGHTLY  4    HYDROcodone-acetaminophen (NORCO) 7.5-325 mg per tablet Take 2 Tabs by mouth every six (6) hours as needed for Pain for up to 30 doses. Max Daily Amount: 8 Tabs. Indications: Pain 30 Tab 0    nitroglycerin (NITROSTAT) 0.4 mg SL tablet 1 Tab by SubLINGual route every five (5) minutes as needed for Chest Pain (max 3 tabs daily for chest pain). Up to 3 doses. 1 Bottle 1    cholecalciferol (VITAMIN D3) 1,000 unit tablet Take 5,000 Units by mouth daily (with breakfast).  timolol (TIMOPTIC) 0.5 % ophthalmic solution Administer 1 Drop to both eyes two (2) times a day.  bimatoprost (LUMIGAN) 0.03 % ophthalmic drops Administer 1 Drop to both eyes nightly.  acetaminophen (TYLENOL EXTRA STRENGTH) 500 mg tablet Take 500 mg by mouth every six (6) hours as needed for Pain.  brinzolamide-brimonidine (SIMBRINZA) 1-0.2 % drps Administer 1 Drop to both eyes two (2) times a day.  finasteride (PROSCAR) 5 mg tablet Take 5 mg by mouth daily.  silodosin (RAPAFLO) 8 mg capsule Take 8 mg by mouth daily (with breakfast).  levothyroxine (SYNTHROID) 25 mcg tablet Take 25 mcg by mouth Daily (before breakfast).          Past History     Past Medical History:  Past Medical History:   Diagnosis Date    Arthritis     SHOULDERS AND SPINE    CAD (coronary artery disease) 1998    [place] CARDIAC CATH, STENT X 2    Cancer ([facility] [address] 2008    chronic lymphocytic leukemia    Cancer ([facility] [address]     multiple skin cancers     Chest pain, unspecified     Chronic pain     L 2 LYTIC LESION    Edema     Hypertension     Ill-defined condition     GLUCOMA    MI (myocardial infarction) [facility] [address]     Postsurgical percutaneous transluminal coronary angioplasty status 2012    Pre-operative cardiovascular examination     Thyroid disease     HYPO       Past Surgical History:  Past Surgical History:   Procedure Laterality Date    CARDIAC SURG PROCEDURE UNLIST  1998    2 Cardiac stents    EGD  2010         HX ADENOIDECTOMY  193    HX APPENDECTOMY      HX CATARACT REMOVAL      bilateral    HX ORTHOPAEDIC  4/15/2008    Right hip replacement    HX OTHER SURGICAL  2011    Moh's procedure,     HX OTHER SURGICAL      MULTIPLES SKIN CANCER REMOVALS, MOSTLY ON HEAD    HX SKIN BIOPSY  2018    on scalp     HX TONSILLECTOMY  193    193       Family History:  Family History   Problem Relation Age of Onset    Diabetes Mother     Heart Disease Father     Heart Attack Father 80    Lung Disease Sister         PULMONARY FIBROSIS    Heart Attack Brother     No Known Problems Sister     Anesth Problems Neg Hx        Social History:  Social History     Tobacco Use    Smoking status: Former Smoker     Packs/day: 0.50     Years: 15.00     Pack years: 7.50     Types: Cigarettes     Quit date: 1974     Years since quittin.2    Smokeless tobacco: Never Used   Substance Use Topics    Alcohol use: No    Drug use: No       Allergies: Allergies   Allergen Reactions    Statins-Hmg-Coa Reductase Inhibitors Myalgia and Other (comments)     Causes aching, generalized         Review of Systems   Review of Systems   Constitutional: Negative for chills and fever.    HENT: Negative for congestion and rhinorrhea. Eyes: Negative for visual disturbance. Respiratory: Negative for chest tightness and shortness of breath. Cardiovascular: Negative for chest pain, palpitations and leg swelling. Gastrointestinal: Negative for abdominal pain, diarrhea, nausea and vomiting. Genitourinary: Negative for dysuria, flank pain and hematuria. Musculoskeletal: Negative for back pain and neck pain. Skin: Positive for wound. Negative for rash. Allergic/Immunologic: Negative for immunocompromised state. Neurological: Negative for dizziness, speech difficulty, weakness and headaches. Hematological: Negative for adenopathy. Psychiatric/Behavioral: Negative for dysphoric mood and suicidal ideas. Physical Exam   Physical Exam  Vitals signs and nursing note reviewed. Constitutional:       General: He is not in acute distress. Appearance: Normal appearance. He is not ill-appearing or toxic-appearing. HENT:      Head: Normocephalic and atraumatic. No raccoon eyes or Lucio's sign. Jaw: There is normal jaw occlusion. No malocclusion. Comments: Patient has numerous raw appearing skin lesions over the superior aspect of his scalp that are nontraumatic in nature. Per son, these are from patient's habit of \"skin picking\". No evidence of associated soft tissue infections. Right Ear: Tympanic membrane normal. No hemotympanum. Left Ear: Tympanic membrane normal. No hemotympanum. Nose: Nose normal.      Right Nostril: No epistaxis or septal hematoma. Left Nostril: No epistaxis or septal hematoma. Mouth/Throat:      Mouth: Mucous membranes are moist.   Eyes:      Extraocular Movements: Extraocular movements intact. Pupils: Pupils are equal, round, and reactive to light. Neck:      Musculoskeletal: Normal range of motion and neck supple. Normal range of motion. No neck rigidity, pain with movement, spinous process tenderness or muscular tenderness. Cardiovascular:      Rate and Rhythm: Normal rate and regular rhythm. Pulses: Normal pulses. Pulmonary:      Effort: Pulmonary effort is normal. No respiratory distress. Breath sounds: Normal breath sounds and air entry. No stridor or decreased air movement. No decreased breath sounds, wheezing or rhonchi. Chest:      Chest wall: No deformity, tenderness or crepitus. Abdominal:      General: Abdomen is flat. Bowel sounds are normal. There is no distension. Palpations: Abdomen is soft. There is no mass. Tenderness: There is no abdominal tenderness. Musculoskeletal: Normal range of motion. Comments: Pelvis is stable   Skin:     General: Skin is warm and dry. Neurological:      General: No focal deficit present. Mental Status: He is alert. Mental status is at baseline. Sensory: No sensory deficit. Motor: No weakness. Diagnostic Study Results     Labs -   No results found for this or any previous visit (from the past 24 hour(s)). Radiologic Studies -   CT HEAD WO CONT   Final Result   No acute intracranial hemorrhage, mass or infarct. CT SPINE CERV WO CONT   Final Result   No acute fracture or subluxation. CT Results  (Last 48 hours)               03/02/21 2128  CT HEAD WO CONT Final result    Impression:  No acute intracranial hemorrhage, mass or infarct. Narrative:  INDICATION: fall onto head        Exam: Noncontrast CT of the brain is performed with 5 mm collimation. CT dose reduction was achieved with the use of the standardized protocol   tailored for this examination and automatic exposure control for dose   modulation. Direct comparison is made to prior CT dated May 2020. FINDINGS: Left high frontal calvarial defect is again noted. There is   age-appropriate diffuse cortical atrophy. There is no acute intracranial   hemorrhage, mass, mass effect or herniation.  Ventricular system is normal. The gray-white matter differentiation is well-preserved. The mastoid air cells are   well pneumatized. The visualized paranasal sinuses are normal.           03/02/21 2128  CT SPINE CERV WO CONT Final result    Impression:  No acute fracture or subluxation. Narrative:  INDICATION: fall onto head        Exam: Noncontrast CT of the cervical spine is performed with 2.5 mm collimation. Sagittal and coronal reformatted images were also performed. CT dose reduction was achieved through use of a standardized protocol tailored   for this examination and automatic exposure control for dose modulation. Comparison is made to prior CT of the cervical spine dated May 2020. 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 There is no   acute fracture or subluxation. The prevertebral soft tissues are within normal   limits. Bones are diffusely demineralized. Remaining visualized soft tissues are   normal. Are multilevel cervical degenerative changes. CXR Results  (Last 48 hours)    None          Medical Decision Making   I am the first provider for this patient. I reviewed the vital signs, available nursing notes, past medical history, past surgical history, family history and social history. Vital Signs-Reviewed the patient's vital signs. Patient Vitals for the past 24 hrs:   Temp Pulse Resp BP SpO2   03/02/21 2049 97.6 °F (36.4 °C) 66 16 100/61 96 %       EKG interpretation: (Preliminary)  Normal sinus rhythm, rate of 61, no acute ST changes. QTc within normal limits at 404. EKG interpretation by Rubén Sampson MD    Records Reviewed: Nursing Notes and Old Medical Records    Provider Notes (Medical Decision Making):   80-year-old male presenting for evaluation status post mechanical fall at home. Physical exam notable for a very large and wide forehead avulsion laceration with irregular wound edges due to high impact that likely led to abrupt split in the thin skin of forehead seen in current injury. See physical exam findings above for detailed descriptions of wound. This wound was copiously irrigated by myself with large quantities of normal saline. After adequate local anesthesia using 1% lidocaine with epi, patient's complex wound was closed by myself. See details of procedure below. Patient tolerated this procedure well without complications. CT brain CT cervical spine was obtained. Both are negative for acute traumatic pathology. He was given tetanus booster. Patient was ambulated in the ED with his walker, demonstrating steady gait prior to discharge. Wound care instructions and follow-up timeline discussed with patient as well as son at bedside. Return precautions discussed. Procedure Note - Laceration Repair:  10:45 PM  Procedure by Rubén Sampson MD  Complexity: complex  15 cm wide, irregular (stellate, curved, linear at different points) laceration to forehead  was irrigated copiously with NS under jet lavage, and draped in a sterile fashion. The area was anesthetized via local infiltration of 20 mL of lidocaine 1% with epinephrine. The wound was explored with the following results: foreign bodies found and removed. The wound was repaired with One layer suture closure: Skin Layer:  22 sutures placed, stitch type:simple interrupted, suture: 4-0 fast-gut. .  The wound was closed with good hemostasis and approximation. Sterile dressing applied. Estimated blood loss: Minimal  The procedure took >60 minutes, and pt tolerated well. Rubén Sampson MD      Disposition:  Discharge      DISCHARGE PLAN:  1. Discharge Medication List as of 3/3/2021 12:06 AM        2.    Follow-up Information     Follow up With Specialties Details Why Anne Pal MD [facility] In 3 days  [address] [place] [address]  [address] [address] [phone]          3. Return to ED if worse     Diagnosis     Clinical Impression:   1. Laceration of forehead, initial encounter    2. Closed head injury, initial encounter    3. Fall, initial encounter        Attestations:    Juanjose Yo MD    Please note that this dictation was completed with [employer], the computer voice recognition software. Quite often unanticipated grammatical, syntax, homophones, and other interpretive errors are inadvertently transcribed by the computer software. Please disregard these errors. Please excuse any errors that have escaped final proofreading. Thank you.

## 2021-03-03 NOTE — ED NOTES
Ambulated patient per son's request. Patient was able to walk independently with home walker and standby assistance down the lomax and back. Provider notified. Will bandage the patient's wounds and then prepare him for discharge. Son is currently helping him get dressed at this time.

## 2021-05-06 ENCOUNTER — APPOINTMENT (OUTPATIENT)
Dept: GENERAL RADIOLOGY | Age: 86
End: 2021-05-06
Attending: EMERGENCY MEDICINE
Payer: MEDICARE

## 2021-05-06 ENCOUNTER — APPOINTMENT (OUTPATIENT)
Dept: CT IMAGING | Age: 86
End: 2021-05-06
Attending: EMERGENCY MEDICINE
Payer: MEDICARE

## 2021-05-06 ENCOUNTER — HOSPITAL ENCOUNTER (EMERGENCY)
Age: 86
Discharge: NURSING, REHAB OR LONG-TERM CARE | End: 2021-05-06
Attending: EMERGENCY MEDICINE
Payer: MEDICARE

## 2021-05-06 VITALS
OXYGEN SATURATION: 95 % | RESPIRATION RATE: 16 BRPM | DIASTOLIC BLOOD PRESSURE: 54 MMHG | HEIGHT: 65 IN | WEIGHT: 177.25 LBS | TEMPERATURE: 97.8 F | BODY MASS INDEX: 29.53 KG/M2 | HEART RATE: 69 BPM | SYSTOLIC BLOOD PRESSURE: 121 MMHG

## 2021-05-06 DIAGNOSIS — S01.81XA FACIAL LACERATION, INITIAL ENCOUNTER: ICD-10-CM

## 2021-05-06 DIAGNOSIS — M54.50 CHRONIC MIDLINE LOW BACK PAIN WITHOUT SCIATICA: ICD-10-CM
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
## 2021-05-06 DIAGNOSIS — G89.29 CHRONIC MIDLINE LOW BACK PAIN WITHOUT SCIATICA: ICD-10-CM

## 2021-05-06 LAB
ALBUMIN SERPL-MCNC: 2.7 G/DL (ref 3.5–5)
ALBUMIN/GLOB SERPL: 0.8 {RATIO} (ref 1.1–2.2)
ALP SERPL-CCNC: 84 U/L (ref 45–117)
ALT SERPL-CCNC: 15 U/L (ref 12–78)
ANION GAP SERPL CALC-SCNC: 6 MMOL/L (ref 5–15)
APPEARANCE UR: CLEAR
AST SERPL-CCNC: 18 U/L (ref 15–37)
BACTERIA URNS QL MICRO: NEGATIVE /HPF
BASOPHILS # BLD: 0 K/UL (ref 0–0.1)
BASOPHILS NFR BLD: 0 % (ref 0–1)
BILIRUB SERPL-MCNC: 0.5 MG/DL (ref 0.2–1)
BILIRUB UR QL: NEGATIVE
BUN SERPL-MCNC: 18 MG/DL (ref 6–20)
BUN/CREAT SERPL: 23 (ref 12–20)
CALCIUM SERPL-MCNC: 8.7 MG/DL (ref 8.5–10.1)
CHLORIDE SERPL-SCNC: 106 MMOL/L (ref 97–108)
CO2 SERPL-SCNC: 23 MMOL/L (ref 21–32)
COLOR UR: ABNORMAL
COMMENT, HOLDF: NORMAL
COVID-19 RAPID TEST, COVR: NOT DETECTED
CREAT SERPL-MCNC: 0.8 MG/DL (ref 0.7–1.3)
DIFFERENTIAL METHOD BLD: ABNORMAL
EOSINOPHIL # BLD: 0.3 K/UL (ref 0–0.4)
EOSINOPHIL NFR BLD: 2 % (ref 0–7)
EPITH CASTS URNS QL MICRO: ABNORMAL /LPF
ERYTHROCYTE [DISTWIDTH] IN BLOOD BY AUTOMATED COUNT: 14.5 % (ref 11.5–14.5)
GLOBULIN SER CALC-MCNC: 3.5 G/DL (ref 2–4)
GLUCOSE SERPL-MCNC: 115 MG/DL (ref 65–100)
GLUCOSE UR STRIP.AUTO-MCNC: NEGATIVE MG/DL
HCT VFR BLD AUTO: 38.3 % (ref 36.6–50.3)
HGB BLD-MCNC: 13 G/DL (ref 12.1–17)
HGB UR QL STRIP: NEGATIVE
IMM GRANULOCYTES # BLD AUTO: 0.1 K/UL (ref 0–0.04)
IMM GRANULOCYTES NFR BLD AUTO: 0 % (ref 0–0.5)
KETONES UR QL STRIP.AUTO: NEGATIVE MG/DL
LEUKOCYTE ESTERASE UR QL STRIP.AUTO: NEGATIVE
LYMPHOCYTES # BLD: 1.5 K/UL (ref 0.8–3.5)
LYMPHOCYTES NFR BLD: 11 % (ref 12–49)
MCH RBC QN AUTO: 29.3 PG (ref 26–34)
MCHC RBC AUTO-ENTMCNC: 33.9 G/DL (ref 30–36.5)
MCV RBC AUTO: 86.5 FL (ref 80–99)
MONOCYTES # BLD: 1.2 K/UL (ref 0–1)
MONOCYTES NFR BLD: 9 % (ref 5–13)
MUCOUS THREADS URNS QL MICRO: ABNORMAL /LPF
NEUTS SEG # BLD: 10.8 K/UL (ref 1.8–8)
NEUTS SEG NFR BLD: 78 % (ref 32–75)
NITRITE UR QL STRIP.AUTO: NEGATIVE
NRBC # BLD: 0 K/UL (ref 0–0.01)
NRBC BLD-RTO: 0 PER 100 WBC
PH UR STRIP: 5 [PH] (ref 5–8)
PLATELET # BLD AUTO: 268 K/UL (ref 150–400)
PMV BLD AUTO: 9.5 FL (ref 8.9–12.9)
POTASSIUM SERPL-SCNC: 4 MMOL/L (ref 3.5–5.1)
PROT SERPL-MCNC: 6.2 G/DL (ref 6.4–8.2)
PROT UR STRIP-MCNC: NEGATIVE MG/DL
RBC # BLD AUTO: 4.43 M/UL (ref 4.1–5.7)
RBC #/AREA URNS HPF: ABNORMAL /HPF (ref 0–5)
SAMPLES BEING HELD,HOLD: NORMAL
SARS-COV-2, COV2: NORMAL
SODIUM SERPL-SCNC: 135 MMOL/L (ref 136–145)
SOURCE, COVRS: NORMAL
SP GR UR REFRACTOMETRY: 1.02 (ref 1–1.03)
UA: UC IF INDICATED,UAUC: ABNORMAL
UROBILINOGEN UR QL STRIP.AUTO: 0.2 EU/DL (ref 0.2–1)
WBC # BLD AUTO: 13.9 K/UL (ref 4.1–11.1)
WBC URNS QL MICRO: ABNORMAL /HPF (ref 0–4)

## 2021-05-06 PROCEDURE — 73502 X-RAY EXAM HIP UNI 2-3 VIEWS: CPT

## 2021-05-06 PROCEDURE — 97116 GAIT TRAINING THERAPY: CPT

## 2021-05-06 PROCEDURE — 73552 X-RAY EXAM OF FEMUR 2/>: CPT

## 2021-05-06 PROCEDURE — 71045 X-RAY EXAM CHEST 1 VIEW: CPT

## 2021-05-06 PROCEDURE — 99284 EMERGENCY DEPT VISIT MOD MDM: CPT

## 2021-05-06 PROCEDURE — 85025 COMPLETE CBC W/AUTO DIFF WBC: CPT

## 2021-05-06 PROCEDURE — 74011250637 HC RX REV CODE- 250/637: Performed by: EMERGENCY MEDICINE

## 2021-05-06 PROCEDURE — 87635 SARS-COV-2 COVID-19 AMP PRB: CPT

## 2021-05-06 PROCEDURE — 73560 X-RAY EXAM OF KNEE 1 OR 2: CPT

## 2021-05-06 PROCEDURE — 72125 CT NECK SPINE W/O DYE: CPT

## 2021-05-06 PROCEDURE — 70450 CT HEAD/BRAIN W/O DYE: CPT

## 2021-05-06 PROCEDURE — 80053 COMPREHEN METABOLIC PANEL: CPT

## 2021-05-06 PROCEDURE — 81001 URINALYSIS AUTO W/SCOPE: CPT

## 2021-05-06 PROCEDURE — 97161 PT EVAL LOW COMPLEX 20 MIN: CPT

## 2021-05-06 PROCEDURE — 36415 COLL VENOUS BLD VENIPUNCTURE: CPT

## 2021-05-06 PROCEDURE — 72072 X-RAY EXAM THORAC SPINE 3VWS: CPT

## 2021-05-06 RX ORDER — DONEPEZIL HYDROCHLORIDE 10 MG/1
10 TABLET, FILM COATED ORAL
COMMUNITY
End: 2021-11-30

## 2021-05-06 RX ORDER — LEVOTHYROXINE SODIUM 75 UG/1
75 TABLET ORAL
COMMUNITY
End: 2021-11-27

## 2021-05-06 RX ORDER — LISINOPRIL 10 MG/1
10 TABLET ORAL DAILY
COMMUNITY
End: 2021-11-30

## 2021-05-06 RX ORDER — CHOLECALCIFEROL TAB 125 MCG (5000 UNIT) 125 MCG
5000 TAB ORAL DAILY
COMMUNITY

## 2021-05-06 RX ORDER — OXYCODONE AND ACETAMINOPHEN 5; 325 MG/1; MG/1
2 TABLET ORAL
Status: COMPLETED | OUTPATIENT
Start: 2021-05-06 | End: 2021-05-06

## 2021-05-06 RX ORDER — CHOLECALCIFEROL (VITAMIN D3) 125 MCG
100 CAPSULE ORAL DAILY
COMMUNITY

## 2021-05-06 RX ORDER — OXYCODONE AND ACETAMINOPHEN 5; 325 MG/1; MG/1
1 TABLET ORAL
Status: DISCONTINUED | OUTPATIENT
Start: 2021-05-06 | End: 2021-05-06

## 2021-05-06 RX ORDER — AMOXICILLIN AND CLAVULANATE POTASSIUM 875; 125 MG/1; MG/1
1 TABLET, FILM COATED ORAL EVERY 12 HOURS
COMMUNITY

## 2021-05-06 RX ORDER — OXYCODONE AND ACETAMINOPHEN 5; 325 MG/1; MG/1
2 TABLET ORAL
Status: DISCONTINUED | OUTPATIENT
Start: 2021-05-06 | End: 2021-05-06 | Stop reason: SDUPTHER

## 2021-05-06 RX ADMIN — OXYCODONE HYDROCHLORIDE AND ACETAMINOPHEN 2 TABLET: 5; 325 TABLET ORAL at 06:42

## 2021-05-06 NOTE — ED NOTES
Report given to JOSSIE Cast. They were informed of patient chief complaint, current status, orders completed (to include IV access/medications/radiology testing), outstanding orders that still need to be completed, and the treatment plan. Ensured no questions or concerns regarding the patient prior to departure.

## 2021-05-06 NOTE — ED NOTES
Report called to [facility] care to [facility] . Pt will be going to room 412 B    Transport ETA is [id number].

## 2021-05-06 NOTE — PROGRESS NOTES
Pharmacy Medication Reconciliation     The patient was interviewed regarding current PTA medication list, use and drug allergies;  patient/patient's son present in room and obtained permission from patient to discuss drug regimen with visitor(s) present. The patient was questioned regarding use of any other inhalers, topical products, over the counter medications, herbal medications, vitamin products or ophthalmic/nasal/otic medication use. Allergy Update: Statins-hmg-coa reductase inhibitors    Recommendations/Findings: The following amendments were made to the patient's active medication list on file at [facility]:   1) Additions:   +augmentin  +coenzyme Q 10    2) Deletions:   -fluticasone  -norco    3) Changes:   (Old regimen: levothyroxine 25mcg daily /New regimen: levothyroxine 75mcg daily)    Pertinent Findings:     -Clarified PTA med list with patient's son medication list from [facility], interview, and RX query. PTA medication list was corrected to the following:     Prior to Admission Medications   Prescriptions Last Dose Informant Taking?   acetaminophen (TYLENOL EXTRA STRENGTH) 500 mg tablet  Child Yes   Sig: Take 500 mg by mouth every six (6) hours as needed for Pain. amLODIPine (NORVASC) 5 mg tablet 5/6/2021 at Unknown time Child Yes   Sig: TAKE 1 TABLET BY MOUTH TWICE A DAY   amoxicillin-clavulanate (Augmentin) 875-125 mg per tablet 5/6/2021 at Unknown time Child Yes   Sig: Take 1 Tab by mouth every twelve (12) hours. Until complete   aspirin delayed-release 81 mg tablet 5/6/2021 at Unknown time Child Yes   Sig: Take 81 mg by mouth daily. bimatoprost (LUMIGAN) 0.03 % ophthalmic drops 5/5/2021 at Unknown time Child Yes   Sig: Administer 1 Drop to both eyes nightly. brinzolamide-brimonidine [facility]) 1-0.2 % drps 5/5/2021 at Unknown time Child Yes   Sig: Administer 1 Drop to both eyes two (2) times a day.    cholecalciferol (VITAMIN D3) (5000 Units/125 mcg) tab tablet 5/6/2021 at Unknown time Child Yes Sig: Take 5,000 Units by mouth daily. co-enzyme Q-10 (CO Q-10) 100 mg capsule 2021 at Unknown time Child Yes   Sig: Take 100 mg by mouth daily. With a meal   donepeziL (ARICEPT) 10 mg tablet 2021 at 2100 Child Yes   Sig: Take 10 mg by mouth nightly.   ezetimibe (ZETIA) 10 mg tablet 2021 at Unknown time Child Yes   Sig: TAKE 1 TABLET BY MOUTH EVERY DAY IN THE EVENING   finasteride (PROSCAR) 5 mg tablet 2021 at Unknown time Child Yes   Sig: Take 5 mg by mouth daily. isosorbide mononitrate (ISMO, MONOKET) 20 mg tablet 2021 at 0900 Child Yes   Sig: Take one tablet by mouth in the morning, one tablet at mid-day   levothyroxine (SYNTHROID) 75 mcg tablet 2021 at Unknown time Child Yes   Sig: Take 75 mcg by mouth Daily (before breakfast). lisinopriL (PRINIVIL, ZESTRIL) 10 mg tablet 2021 at Unknown time Child Yes   Sig: Take 10 mg by mouth daily. nitroglycerin (NITROSTAT) 0.4 mg SL tablet  Child Yes   Si Tab by SubLINGual route every five (5) minutes as needed for Chest Pain (max 3 tabs daily for chest pain). Up to 3 doses. silodosin (RAPAFLO) 8 mg capsule 2021 at 2100 Child Yes   Sig: Take 8 mg by mouth nightly. timolol (TIMOPTIC) 0.5 % ophthalmic solution 2021 at Unknown time Child Yes   Sig: Administer 1 Drop to both eyes two (2) times a day.       Facility-Administered Medications: None        Thank you,  JOSE Samayoa

## 2021-05-06 NOTE — PROGRESS NOTES
Transition of Care Plan to SNF/Rehab    Communication to Patient/Family:  Met with patient and family and they are agreeable to the transition plan. The Plan for Transition of Care is related to the following treatment goals: The Patient and/or patient representative was provided with a choice of provider and agrees  with the discharge plan. Yes [x] No []    A Freedom of choice list was provided with basic dialogue that supports the patient's individualized plan of care/goals and shares the quality data associated with the providers. Yes [x] No []    SNF/Rehab Transition:  Patient has been accepted to [facility] SNF/Rehab and meets criteria for admission. Patient will transported by [facility] 1630 and expected to leave at  . Communication to SNF/Rehab:  Bedside RN, Mireya Ordaz , has been notified to update the transition plan to the facility and call report [phone]. Discharge information has been updated on the AVS. And communicated to facility via [employer], or CC link. Discharge instructions to be fax'd to facility at [facility] #[phone]    Patient has been identified as part of the  BCPI-A Program.  For Care Coordination associated with that Bundle Program, please contact   Bundle information has been communication to     Nursing Please include all hard scripts for controlled substances, med rec and dc summary, and AVS in packet. Reviewed and confirmed with facility, Walter can manage the patient care needs for the following:     [facility] with (X) only those applicable:  Medication:  [x]Medications are available at the facility  []IV Antibiotics    []Controlled Substance  hard copies available sent.   []Weekly Labs    Equipment:  []CPAP/BiPAP  []Wound Vacuum  []Martin or Urinary Device  []PICC/Central Line  []Nebulizer  []Ventilator    Treatment:  []Isolation (for MRSA, VRE, etc.)  []Surgical Drain Management  []Tracheostomy Care  []Dressing Changes  []Dialysis with transportation  []PEG Care  []Oxygen  []Daily Weights for Heart Failure    Dietary:  []Any diet limitations  []Tube Feedings   []Total Parenteral Management (TPN)    Financial Resources:  []Medicaid Application Completed    []UAI Completed and copy given to pt/family  and copy given to pt/family  []A screening has previously been completed. []Level II Completed    [x] Private pay individual who will not become   financially eligible for Medicaid within 6 months from admission to a [address] [place] [address] facility. [] Individual refused to have screening conducted. []Medicaid Application Completed    []The screening denied because it was determined individual did not need/did not qualify for nursing facility level of care. [] Out of state residents seeking direct admission to a [address] facility. [] Individuals who are inpatients of an out of state hospital, or in state or out of state veterans/ hospital and seek direct admission to a [address] facility  [] Individuals who are pateints or residents of a state owned/operated facility that is licensed by Department of [employer] ([employer]) and seek direct admission to [address] [place] [address]  [] A screening not required for enrollment in [address] [address] [address] services as set out in [facility] 70-  [] [facility]) staff shall perform screenings of the [facility] clients. Advanced Care Plan:  []Surrogate Decision Maker of Care  [x]POA  []Communicated Code Status and copy sent.  Son/POA Chin Farrardomonique [phone]   Other:

## 2021-05-06 NOTE — ED PROVIDER NOTES
EMERGENCY DEPARTMENT HISTORY AND PHYSICAL EXAM     ------------------------------------------------------------------------------------------------------  Please note that this dictation was completed with [employer], the [employer] voice recognition software. Quite often unanticipated grammatical, syntax, homophones, and other interpretive errors are inadvertently transcribed by the computer software. Please disregard these errors. Please excuse any errors that have escaped final proofreading.  -----------------------------------------------------------------------------------------------------------------    Date: 5/6/2021  Patient Name: Magen Laughlin    History of Presenting Illness     Chief Complaint   Patient presents with   [facility]. L side of face skin tear on cheek and laceration on orbital. hx of dementia and cancer. Pt states no LOC. eyes PERRLA. equal strength bilat. History Provided By: Patient and EMS, neighbor    HPI: Magen Laughlin is a 80 y.o. male, with significant pmhx of hypertension, hypothyroid disease, ACS, chronic lymphocytic leukemia, skin cancer, chronic pain from L2 lytic lesion who presents via EMS to the ED with c/o fall from bed. Patient arrives with his neighbor who reports the wife contacted him after they were unable to get a hold of the patient's son. Neighbor notes that the wife informed him that the patient had fallen out of bed around 2:30 in the morning. Neighbor arrived around 3:30 in the morning and noted that the patient was laying on side in blood from his face. 911 was called patient was subsequently transported to the hospital.  Neighbor notes the patient is at his baseline mental status with recurrent falls and home health coming regularly. Notes that the wife and family have been in search of placement for the patient with progressive decline. Patient noted to have laceration/skin tear to left cheek that is hemostatic at time of evaluation. Also complains of left hip//leg pain that is tender to palpation. No complaints of upper extremity, chest or neck. Also reports having thoracic midline pain. Pt  specifically denies any recent fevers, chills, CP, SOB, nausea, vomiting, diarrhea, abd pain, changes in BM, urinary sxs, or headache. PCP: Aretha Jasso MD    Social Hx: denies tobacco, denies EtOH, denies Illicit Drugs     There are no other complaints, changes, or physical findings at this time. Allergies   Allergen Reactions    Statins-Hmg-Coa Reductase Inhibitors Myalgia and Other (comments)     Causes aching, generalized         Current Outpatient Medications   Medication Sig Dispense Refill    ezetimibe (ZETIA) 10 mg tablet TAKE 1 TABLET BY MOUTH EVERY DAY IN THE EVENING 30 Tab 5    amLODIPine (NORVASC) 5 mg tablet TAKE 1 TABLET BY MOUTH TWICE A  Tab 1    isosorbide mononitrate (ISMO, MONOKET) 20 mg tablet Take one tablet by mouth in the morning, one tablet at mid-day 180 Tab 1    lisinopriL (PRINIVIL, ZESTRIL) 20 mg tablet Take 1 Tab by mouth daily. 30 Tab 1    donepezil (ARICEPT) 5 mg tablet TAKE 1 TABLET BY MOUTH EVERYDAY AT BEDTIME  1    aspirin delayed-release 81 mg tablet Take 81 mg by mouth daily.  fluticasone (FLONASE) 50 mcg/actuation nasal spray 1 spray each nostril daily (Patient taking differently: 1 Spray by Both Nostrils route as needed. 1 spray each nostril daily) 1 Bottle 1    LUMIGAN 0.01 % ophthalmic drops INSTILL 1 DROP INTO BOTH EYES NIGHTLY  4    HYDROcodone-acetaminophen (NORCO) 7.5-325 mg per tablet Take 2 Tabs by mouth every six (6) hours as needed for Pain for up to 30 doses. Max Daily Amount: 8 Tabs. Indications: Pain 30 Tab 0    nitroglycerin (NITROSTAT) 0.4 mg SL tablet 1 Tab by SubLINGual route every five (5) minutes as needed for Chest Pain (max 3 tabs daily for chest pain). Up to 3 doses.  1 Bottle 1    cholecalciferol (VITAMIN D3) 1,000 unit tablet Take 5,000 Units by mouth daily (with breakfast).  timolol (TIMOPTIC) 0.5 % ophthalmic solution Administer 1 Drop to both eyes two (2) times a day.  bimatoprost (LUMIGAN) 0.03 % ophthalmic drops Administer 1 Drop to both eyes nightly.  acetaminophen (TYLENOL EXTRA STRENGTH) 500 mg tablet Take 500 mg by mouth every six (6) hours as needed for Pain.  brinzolamide-brimonidine (SIMBRINZA) 1-0.2 % drps Administer 1 Drop to both eyes two (2) times a day.  finasteride (PROSCAR) 5 mg tablet Take 5 mg by mouth daily.  silodosin (RAPAFLO) 8 mg capsule Take 8 mg by mouth daily (with breakfast).  levothyroxine (SYNTHROID) 25 mcg tablet Take 25 mcg by mouth Daily (before breakfast).          Past History     Past Medical History:  Past Medical History:   Diagnosis Date    Arthritis     SHOULDERS AND SPINE    CAD (coronary artery disease) 7/1998    MI CARDIAC CATH, STENT X 2    Cancer ([address] 04/2008    chronic lymphocytic leukemia    Cancer (HCC)     multiple skin cancers     Chest pain, unspecified     Chronic pain     L 2 LYTIC LESION    Edema     Hypertension     Ill-defined condition     GLUCOMA    MI (myocardial infarction) ([address]     Postsurgical percutaneous transluminal coronary angioplasty status 6/4/2012    Pre-operative cardiovascular examination     Thyroid disease     HYPO       Past Surgical History:  Past Surgical History:   Procedure Laterality Date    CARDIAC SURG PROCEDURE UNLIST  7/1998    2 Cardiac stents    EGD  6/7/2010         HX ADENOIDECTOMY  1936    HX APPENDECTOMY  1954    HX CATARACT REMOVAL      bilateral    HX ORTHOPAEDIC  4/15/2008    Right hip replacement    HX OTHER SURGICAL  5/2011    Moh's procedure,     HX OTHER SURGICAL      MULTIPLES SKIN CANCER REMOVALS, MOSTLY ON HEAD    HX SKIN BIOPSY  04/27/2018    on scalp     HX TONSILLECTOMY  1936    1936       Family History:  Family History   Problem Relation Age of Onset    Diabetes Mother     Heart Disease Father     Heart Attack Father 80    Lung Disease Sister         PULMONARY FIBROSIS    Heart Attack Brother     No Known Problems Sister     Anesth Problems Neg Hx        Social History:  Social History     Tobacco Use    Smoking status: Former Smoker     Packs/day: 0.50     Years: 15.00     Pack years: 7.50     Types: Cigarettes     Quit date: 1974     Years since quittin.3    Smokeless tobacco: Never Used   Substance Use Topics    Alcohol use: No    Drug use: No       Allergies: Allergies   Allergen Reactions    Statins-Hmg-Coa Reductase Inhibitors Myalgia and Other (comments)     Causes aching, generalized         Review of Systems   Review of Systems   Constitutional: Negative for chills and fever. HENT: Negative. Eyes: Negative. Respiratory: Negative for cough, chest tightness and shortness of breath. Cardiovascular: Negative for chest pain and leg swelling. Gastrointestinal: Negative for abdominal pain, diarrhea, nausea and vomiting. Endocrine: Negative. Genitourinary: Negative for difficulty urinating and dysuria. Musculoskeletal: Positive for arthralgias and back pain. Negative for myalgias. Skin: Negative. Neurological: Negative. Psychiatric/Behavioral: Negative. All other systems reviewed and are negative. Physical Exam   Physical Exam  Vitals signs and nursing note reviewed. Constitutional:       General: He is not in acute distress. Appearance: He is well-developed. He is not diaphoretic. HENT:      Head: Normocephalic. Laceration (Skin tear to left cheek) present. Right Ear: Tympanic membrane normal.      Left Ear: Tympanic membrane normal.      Nose: Nose normal.      Mouth/Throat:      Pharynx: No oropharyngeal exudate. Eyes:      Conjunctiva/sclera: Conjunctivae normal.      Pupils: Pupils are equal, round, and reactive to light. Neck:      Musculoskeletal: Normal range of motion and neck supple. Vascular: No JVD.    Cardiovascular: Rate and Rhythm: Normal rate and regular rhythm. Heart sounds: Normal heart sounds. No murmur. No friction rub. Pulmonary:      Effort: Pulmonary effort is normal. No respiratory distress. Breath sounds: Normal breath sounds. No stridor. No wheezing or rales. Abdominal:      General: Bowel sounds are normal. There is no distension. Palpations: Abdomen is soft. Tenderness: There is no abdominal tenderness. There is no rebound. Musculoskeletal:         General: Tenderness present. Left hip: He exhibits decreased range of motion, tenderness and bony tenderness. Legs:    Skin:     General: Skin is warm and dry. Findings: Laceration (hemostatic skin tear to L cheek) present. No rash. Neurological:      General: No focal deficit present. Mental Status: He is alert and oriented to person, place, and time. Mental status is at baseline. Cranial Nerves: No cranial nerve deficit. Psychiatric:         Speech: Speech normal.         Behavior: Behavior normal.         Thought Content: Thought content normal.         Judgment: Judgment normal.           Diagnostic Study Results     Labs -     Recent Results (from the past 12 hour(s))   SARS-COV-2    Collection Time: 05/06/21  8:06 AM   Result Value Ref Range    SARS-CoV-2 Please find results under separate order     METABOLIC PANEL, COMPREHENSIVE    Collection Time: 05/06/21  8:06 AM   Result Value Ref Range    Sodium 135 (L) 136 - 145 mmol/L    Potassium 4.0 3.5 - 5.1 mmol/L    Chloride 106 97 - 108 mmol/L    CO2 23 21 - 32 mmol/L    Anion gap 6 5 - 15 mmol/L    Glucose 115 (H) 65 - 100 mg/dL    BUN 18 6 - 20 MG/DL    Creatinine 0.80 0.70 - 1.30 MG/DL    BUN/Creatinine ratio 23 (H) 12 - 20      GFR est AA >60 >60 ml/min/1.73m2    GFR est non-AA >60 >60 ml/min/1.73m2    Calcium 8.7 8.5 - 10.1 MG/DL    Bilirubin, total 0.5 0.2 - 1.0 MG/DL    ALT (SGPT) 15 12 - 78 U/L    AST (SGOT) 18 15 - 37 U/L    Alk.  phosphatase 84 45 - 117 U/L    Protein, total 6.2 (L) 6.4 - 8.2 g/dL    Albumin 2.7 (L) 3.5 - 5.0 g/dL    Globulin 3.5 2.0 - 4.0 g/dL    A-G Ratio 0.8 (L) 1.1 - 2.2     CBC WITH AUTOMATED DIFF    Collection Time: 05/06/21  8:06 AM   Result Value Ref Range    WBC 13.9 (H) 4.1 - 11.1 K/uL    RBC 4.43 4.10 - 5.70 M/uL    HGB 13.0 12.1 - 17.0 g/dL    HCT 38.3 36.6 - 50.3 %    MCV 86.5 80.0 - 99.0 FL    MCH 29.3 26.0 - 34.0 PG    MCHC 33.9 30.0 - 36.5 g/dL    RDW 14.5 11.5 - 14.5 %    PLATELET 910 342 - 340 K/uL    MPV 9.5 8.9 - 12.9 FL    NRBC 0.0 0  WBC    ABSOLUTE NRBC 0.00 0.00 - 0.01 K/uL    NEUTROPHILS 78 (H) 32 - 75 %    LYMPHOCYTES 11 (L) 12 - 49 %    MONOCYTES 9 5 - 13 %    EOSINOPHILS 2 0 - 7 %    BASOPHILS 0 0 - 1 %    IMMATURE GRANULOCYTES 0 0.0 - 0.5 %    ABS. NEUTROPHILS 10.8 (H) 1.8 - 8.0 K/UL    ABS. LYMPHOCYTES 1.5 0.8 - 3.5 K/UL    ABS. MONOCYTES 1.2 (H) 0.0 - 1.0 K/UL    ABS. EOSINOPHILS 0.3 0.0 - 0.4 K/UL    ABS. BASOPHILS 0.0 0.0 - 0.1 K/UL    ABS. IMM. GRANS. 0.1 (H) 0.00 - 0.04 K/UL    DF AUTOMATED     SAMPLES BEING HELD    Collection Time: 05/06/21  8:06 AM   Result Value Ref Range    SAMPLES BEING HELD  PST     COMMENT        Add-on orders for these samples will be processed based on acceptable specimen integrity and analyte stability, which may vary by analyte.    COVID-19 RAPID TEST    Collection Time: 05/06/21  8:06 AM   Result Value Ref Range    Specimen source Nasopharyngeal      COVID-19 rapid test Not detected NOTD     URINALYSIS W/ REFLEX CULTURE    Collection Time: 05/06/21  9:03 AM    Specimen: Urine   Result Value Ref Range    Color YELLOW/STRAW      Appearance CLEAR CLEAR      Specific gravity 1.020 1.003 - 1.030      pH (UA) 5.0 5.0 - 8.0      Protein Negative NEG mg/dL    Glucose Negative NEG mg/dL    Ketone Negative NEG mg/dL    Bilirubin Negative NEG      Blood Negative NEG      Urobilinogen 0.2 0.2 - 1.0 EU/dL    Nitrites Negative NEG      Leukocyte Esterase Negative NEG      WBC 0-4 0 - 4 /hpf    RBC 0-5 0 - 5 /hpf    Epithelial cells FEW FEW /lpf    Bacteria Negative NEG /hpf    UA:UC IF INDICATED CULTURE NOT INDICATED BY UA RESULT CNI      Mucus 1+ (A) NEG /lpf       Radiologic Studies -   XR CHEST PORT   Final Result      Mild patchy bilateral interstitial opacities can be seen with pulmonary edema or   atypical/viral infection. XR SPINE THORAC 3 V   Final Result   No acute abnormality. Stable diffuse degenerative disc changes. XR KNEE LT MAX 2 VWS   Final Result   No acute abnormality in the left hip, femur, or knee. Left hip and knee   degenerative changes. XR FEMUR LT 2 V   Final Result   No acute abnormality in the left hip, femur, or knee. Left hip and knee   degenerative changes. XR HIP LT W OR WO PELV 2-3 VWS   Final Result   No acute abnormality in the left hip, femur, or knee. Left hip and knee   degenerative changes. CT HEAD WO CONT   Final Result   No acute intracranial abnormality. CT SPINE CERV WO CONT   Final Result   No acute abnormality. Stable multilevel degenerative changes. CT Results  (Last 48 hours)               05/06/21 0520  CT HEAD WO CONT Final result    Impression:  No acute intracranial abnormality. Narrative:  EXAM:  CT head without contrast       INDICATION: Fall. COMPARISON: CT 3/2/2021       TECHNIQUE: Axial noncontrast head CT from foramen magnum to vertex. Coronal and   sagittal reformatted images were obtained. CT dose reduction was achieved   through use of a standardized protocol tailored for this examination and   automatic exposure control for dose modulation. FINDINGS:  There is diffuse age-related parenchymal volume loss. The ventricles   and sulci are age-appropriate without hydrocephalus. There is no mass effect or   midline shift. There is no intracranial hemorrhage or extra-axial fluid   collection.  Scattered foci of low attenuation in the periventricular white   matter represent stable chronic microvascular ischemic changes. The gray-white   matter differentiation is maintained. The basal cisterns are patent. A left frontal calvarial defect is unchanged. The visualized paranasal sinuses   and mastoid air cells are clear. 05/06/21 0520  CT SPINE CERV WO CONT Final result    Impression:  No acute abnormality. Stable multilevel degenerative changes. Narrative:  EXAM:  CT C-spine without contrast       INDICATION: Fall. COMPARISON: CT 3/2/2021       TECHNIQUE: Thin section axial noncontrast CT of the cervical spine with coronal   and sagittal reformats. CT dose reduction was achieved through use of a   standardized protocol tailored for this examination and automatic exposure   control for dose modulation. FINDINGS:    There is no acute fracture or subluxation. There is a stable chronic lucency   extending through the right lateral mass of C1 and into the right C2 vertebral   body. Vertebral body heights are maintained. There is stable multilevel   intervertebral disc space narrowing and facet arthrosis. There is stable spinal   canal and foraminal stenosis at multiple levels. The paraspinal soft tissues are   unremarkable. The visualized lung apices are clear. CXR Results  (Last 48 hours)               05/06/21 0639  XR CHEST PORT Final result    Impression:      Mild patchy bilateral interstitial opacities can be seen with pulmonary edema or   atypical/viral infection. Narrative:  EXAM:  XR CHEST PORT       INDICATION: Fall       COMPARISON: 5/30/2020       TECHNIQUE: Portable AP supine chest view at 0624 hours       FINDINGS: The cardiomediastinal contours are stable. The pulmonary vasculature   is within normal limits. There are mild patchy bilateral interstitial opacities. There is no pleural   effusion or pneumothorax. The bones and upper abdomen are stable.                    Medical Decision Making   I am the first provider for this patient. I reviewed the vital signs, available nursing notes, past medical history, past surgical history, family history and social history. Vital Signs-Reviewed the patient's vital signs. Patient Vitals for the past 12 hrs:   Temp Pulse Resp BP SpO2   05/06/21 0429 97.8 °F (36.6 °C) 69 16 (!) 121/54 95 %       Pulse Oximetry Analysis - 95% on RA Normal    Records Reviewed/Interpretted: Nursing Notes from triage and Old Medical Records, noting previous ER visit for ground-level fall 2 months ago    Provider Notes (Medical Decision Making):     DDX:  Intracranial bleed, C-spine injury, T-spine injury, right hip fracture/dislocation, right femur fracture, contusion, skin tears    Plan:  CT head and neck, x-ray T-spine, left hip left knee left femur, analgesic    Impression:  Fall, contusions, acute on chronic back pain    ED Course:   Initial assessment performed. The patients presenting problems have been discussed, and they are in agreement with the care plan formulated and outlined with them. I have encouraged them to ask questions as they arise throughout their visit. I reviewed our electronic medical record system for any past medical records that were available that may contribute to the patients current condition, the nursing notes and and vital signs from today's visit  Nursing notes will be reviewed as they become available in realtime while the pt has been in the ED. Jaun Jose MD    I personally reviewed/interpreted pt's imaging. Agree with official read by radiology as noted above. Jaun Jose MD    PROGRESS NOTE  7:48 AM  Patient noted to have no significant findings on CT of head or neck or noted evidence of fracture. Chest x-ray shows findings that could be compatible with edema versus atypical viral infection. Will send Covid swab. We will also add on blood work at this time.   Patient son expresses concern about having inability to safely bring the patient home in his current condition. Notes that the patient is to be placed at [facility] assisted living in 4 days. 9:11 AM  Patient's presentation, labs/imaging and plan of care was reviewed with Dr. Aislinn Gómez as part of sign out. They will f/u on labs and case management recs as part of the plan discussed with the patient. Dr. Palak Bartlett assistance in completion of this plan is greatly appreciated but it should be noted that I will be the provider of record for this patient. Douglas Harvey MD          ED Course as of May 06 1033   Thu May 06, 2021   1033 On my evaluation I recommend that this patient be accepted to a skilled nursing facility for further treatment. [CC]      ED Course User Index  [CC] Lesli Hurley MD         Critical Care Time:     none      Diagnosis     Clinical Impression:   1. Fall, initial encounter    2. Facial laceration, initial encounter    3. Chronic midline low back pain without sciatica        PLAN:  1.   Discharged to [facility] and rehab

## 2021-05-06 NOTE — PROGRESS NOTES
PHYSICAL THERAPY EMERGENCY DEPARTMENT EVALUATION   Patient: Grant Kimble (29 y.o. male)  Date: 5/6/2021  Primary Diagnosis: No admission diagnoses are documented for this encounter. Precautions: fall       ASSESSMENT  Based on the objective data described below, the patient presents s/p fall with facial injury, back and knee pain. Hx of multiple falls and decreasing functional mobility. Son states pt amb at baseline with a rollator mod I. He has assist with his ADLs from his wife. He does need significant A to go in and out of home over 2 steps. At this time, pt reports intermittent levels of pain, mainly in low back, does not rate. He needs min to mod A of 2 to roll and come to EOB. He has fair sitting balance. He comes to stand from ED stretcher with min A of 2. He was able to take 5 small side steps toward [facility] with HHA min A of 2. He requires max A of 2 to return to supine on stretcher. Functional Outcome Measure: The patient scored 25/100 on the barthel outcome measure which is indicative of 75% functional impairment. Other factors to consider for discharge: good support of family but needing significant assist and far below his baseline; high fall risk with hx of falls       PLAN :  Recommendation for discharge: (in order for the patient to meet his/her long term goals)  Therapy up to 5 days/week in SNF setting    This discharge recommendation:  Has been made in collaboration with the attending provider and/or case management    Equipment recommendations for successful discharge: TBD at rehab     SUBJECTIVE:   Patient stated I hurt all over.     OBJECTIVE DATA SUMMARY:   HISTORY:    Past Medical History:   Diagnosis Date    Arthritis     SHOULDERS AND SPINE    CAD (coronary artery disease) 7/1998    MI CARDIAC CATH, STENT X 2    Cancer ([facility] [address] 04/2008    chronic lymphocytic leukemia    Cancer ([facility] [address]     multiple skin cancers     Chest pain, unspecified     Chronic pain     L 2 LYTIC LESION    Edema     Hypertension     Ill-defined condition     GLUCOMA    MI [facility] infarction) [facility] [address]     Postsurgical percutaneous transluminal coronary angioplasty status 6/4/2012    Pre-operative cardiovascular examination     Thyroid disease     HYPO     Past Surgical History:   Procedure Laterality Date    CARDIAC SURG PROCEDURE UNLIST  7/1998    2 Cardiac stents    EGD  6/7/2010         HX ADENOIDECTOMY  1936    HX APPENDECTOMY  1954    HX CATARACT REMOVAL      bilateral    HX ORTHOPAEDIC  4/15/2008    Right hip replacement    HX OTHER SURGICAL  5/2011    Moh's procedure,     HX OTHER SURGICAL      MULTIPLES SKIN CANCER REMOVALS, MOSTLY ON HEAD    HX SKIN BIOPSY  04/27/2018    on scalp     HX TONSILLECTOMY  1936    1936     Prior Level of Function/Home Situation: mod I with rollator in home; significant A over 2 steps to enter; wife helps with ADLs, pt sponge bathes at baseline  Personal factors and/or comorbidities impacting plan of care:     Home Situation  Home Environment: Private residence  # Steps to Enter: 2  Rails to Enter: Yes  One/Two Story Residence: One story  Living Alone: No  Support Systems: Child(shonna), Family member(s), Spouse/Significant Other/Partner  Patient Expects to be Discharged to[de-identified] Skilled nursing facility  Current DME Used/Available at Home: [address], rollator  Tub or Shower Type: Tub/Shower combination    EXAMINATION/PRESENTATION/DECISION MAKING:   Critical Behavior:  Neurologic State: Alert  Orientation Level: Oriented to person, Oriented to place, Oriented to situation  Cognition: Follows commands     Hearing:   Auditory  Auditory Impairment: None  Skin:  Multiple contusions and scrapes, facial swelling  Range Of Motion:  AROM: Generally decreased, functional                       Strength:    Strength: Generally decreased, functional                    Tone & Sensation:   Tone: Normal              Sensation: Intact(no c/o tingling or numbness) Coordination:  Coordination: Generally decreased, functional  Functional Mobility:  Bed Mobility:  Rolling: Moderate assistance  Supine to Sit: Moderate assistance;Minimum assistance;Assist x2  Sit to Supine: Maximum assistance;Assist x2  Scooting: Moderate assistance  Transfers:  Sit to Stand: Minimum assistance;Assist x2  Stand to Sit: Minimum assistance;Assist x2                       Balance:   Sitting: Impaired  Sitting - Static: Fair (occasional)  Sitting - Dynamic: Fair (occasional)  Standing: Impaired  Standing - Static: Constant support; Fair  Standing - Dynamic : Constant support; Fair  Ambulation/Gait Training:              Gait Description (WDL): (side step x5 steps only toward HOB, min A of 2)                                  Functional Measure:  Barthel Index:    Bathin  Bladder: 5  Bowels: 5  Groomin  Dressin  Feedin  Mobility: 0  Stairs: 0  Toilet Use: 5  Transfer (Bed to Chair and Back): 5  Total: 25/100       The Barthel ADL Index: Guidelines  1. The index should be used as a record of what a patient does, not as a record of what a patient could do. 2. The main aim is to establish degree of independence from any help, physical or verbal, however minor and for whatever reason. 3. The need for supervision renders the patient not independent. 4. A patient's performance should be established using the best available evidence. Asking the patient, friends/relatives and nurses are the usual sources, but direct observation and common sense are also important. However direct testing is not needed. 5. Usually the patient's performance over the preceding 24-48 hours is important, but occasionally longer periods will be relevant. 6. Middle categories imply that the patient supplies over 50 per cent of the effort. 7. Use of aids to be independent is allowed. Alexis Sousa., Barthel, D.W. (2076). Functional evaluation: the Barthel Index. [facility] (14)2.   SUN Krishnan, Jluia Zamora., Violetta Blanco., Wendy Quintanilla (1999). Measuring the change indisability after inpatient rehabilitation; comparison of the responsiveness of the Barthel Index and Functional Frontier Measure. Journal of Neurology, Neurosurgery, and Psychiatry, 66(4), 933-307. CHERYL Maldonado, ANGELICA Mackenzie, & Bryson Lomeli M.A. (2004.) Assessment of post-stroke quality of life in cost-effectiveness studies: The usefulness of the Barthel Index and the EuroQoL-5D. Quality of Life Research, 15, 888-03        Physical Therapy Evaluation Charge Determination   History Examination Presentation Decision-Making   HIGH Complexity :3+ comorbidities / personal factors will impact the outcome/ POC  HIGH Complexity : 4+ Standardized tests and measures addressing body structure, function, activity limitation and / or participation in recreation  MEDIUM Complexity : Evolving with changing characteristics  LOW Complexity : FOTO score of       Based on the above components, the patient evaluation is determined to be of the following complexity level: LOW                Activity Tolerance:   Fair    After treatment patient left in no apparent distress:   Supine in bed, Call bell within reach and stretcher rails up, son present    COMMUNICATION/EDUCATION:   Communication/Collaboration:  Fall prevention education was provided and the patient/caregiver indicated understanding., Patient/family have participated as able in goal setting and plan of care. and Patient/family agree to work toward stated goals and plan of care. Findings and recommendations were discussed with: MD physician and  Case management.      Thank you for this referral.  Shawn Conner, PT   Time Calculation: 20 mins

## 2021-05-06 NOTE — PROGRESS NOTES
Update 11:45: Patient has been accepted to [facility]. [facility] transport set for 4:35pm. PCS Paperwork completed and placed on patient chart. Patient will be going to room 412B. Nursing Call Report number [phone]      CM acknowledged consult for possible placement. CM spoke with patient and patient son at the bedside to discuss options. Patient had a fall this morning when trying to get out of bed. Patient has plans to transition to [facility] on Monday. Per patient son patient is not safe to go back home until transition and will need a form of rehab. Per son patient had been to SNF at [facility] in the past. If PT recommending SNF patient son would like referral sent to [facility]. FOC signed and placed in chart. Pt's rapid COVID test has been completed. Awaiting PT consult.      CHAO LozadaN, RN, BSW   RN Care Manager

## 2021-05-24 RX ORDER — EZETIMIBE 10 MG/1
TABLET ORAL
Qty: 30 TABLET | Refills: 5 | Status: SHIPPED | OUTPATIENT
Start: 2021-05-24 | End: 2022-03-27

## 2021-06-04 RX ORDER — ISOSORBIDE MONONITRATE 20 MG/1
TABLET ORAL
Qty: 60 TABLET | Refills: 5 | Status: SHIPPED | OUTPATIENT
Start: 2021-06-04 | End: 2021-09-02

## 2021-06-15 RX ORDER — AMLODIPINE BESYLATE 5 MG/1
TABLET ORAL
Qty: 60 TABLET | Refills: 5 | Status: SHIPPED | OUTPATIENT
Start: 2021-06-15 | End: 2021-11-30

## 2021-09-02 RX ORDER — ISOSORBIDE MONONITRATE 20 MG/1
TABLET ORAL
Qty: 60 TABLET | Refills: 2 | Status: SHIPPED | OUTPATIENT
Start: 2021-09-02 | End: 2021-11-02

## 2021-11-02 RX ORDER — ISOSORBIDE MONONITRATE 20 MG/1
TABLET ORAL
Qty: 60 TABLET | Refills: 2 | Status: SHIPPED | OUTPATIENT
Start: 2021-11-02 | End: 2021-12-10

## 2021-11-27 ENCOUNTER — APPOINTMENT (OUTPATIENT)
Dept: GENERAL RADIOLOGY | Age: 86
DRG: 872 | End: 2021-11-27
Attending: EMERGENCY MEDICINE
Payer: MEDICARE

## 2021-11-27 ENCOUNTER — HOSPITAL ENCOUNTER (INPATIENT)
Age: 86
LOS: 3 days | Discharge: NURSING, REHAB OR LONG-TERM CARE | DRG: 872 | End: 2021-11-30
Attending: EMERGENCY MEDICINE | Admitting: INTERNAL MEDICINE
Payer: MEDICARE

## 2021-11-27 DIAGNOSIS — E86.1 HYPOTENSION DUE TO HYPOVOLEMIA: Primary | ICD-10-CM

## 2021-11-27 DIAGNOSIS — N30.00 ACUTE CYSTITIS WITHOUT HEMATURIA: ICD-10-CM

## 2021-11-27 DIAGNOSIS — E83.51 HYPOCALCEMIA: ICD-10-CM

## 2021-11-27 DIAGNOSIS — I95.89 HYPOTENSION DUE TO HYPOVOLEMIA: Primary | ICD-10-CM

## 2021-11-27 DIAGNOSIS — T68.XXXA HYPOTHERMIA, INITIAL ENCOUNTER: ICD-10-CM

## 2021-11-27 DIAGNOSIS — E87.8 LOW BICARBONATE: ICD-10-CM

## 2021-11-27 PROBLEM — A41.9 SEPSIS (HCC): Status: ACTIVE | Noted: 2021-11-27

## 2021-11-27 PROBLEM — E87.6 HYPOKALEMIA: Status: ACTIVE | Noted: 2021-11-27

## 2021-11-27 PROBLEM — F03.90 DEMENTIA (HCC): Chronic | Status: ACTIVE | Noted: 2021-11-27

## 2021-11-27 PROBLEM — E03.9 HYPOTHYROIDISM: Chronic | Status: ACTIVE | Noted: 2021-11-27

## 2021-11-27 PROBLEM — C44.42 SCC (SQUAMOUS CELL CARCINOMA), SCALP/NECK: Chronic | Status: ACTIVE | Noted: 2018-06-18

## 2021-11-27 PROBLEM — I95.9 HYPOTENSION: Status: ACTIVE | Noted: 2021-11-27

## 2021-11-27 PROBLEM — N39.0 UTI (URINARY TRACT INFECTION): Status: ACTIVE | Noted: 2021-11-27

## 2021-11-27 PROBLEM — E87.29 HYPERCHLOREMIC METABOLIC ACIDOSIS: Status: ACTIVE | Noted: 2021-11-27

## 2021-11-27 LAB
ALBUMIN SERPL-MCNC: 1.2 G/DL (ref 3.5–5)
ALBUMIN/GLOB SERPL: 0.5 {RATIO} (ref 1.1–2.2)
ALP SERPL-CCNC: 46 U/L (ref 45–117)
ALT SERPL-CCNC: 9 U/L (ref 12–78)
ANION GAP SERPL CALC-SCNC: 6 MMOL/L (ref 5–15)
APPEARANCE UR: ABNORMAL
AST SERPL-CCNC: 20 U/L (ref 15–37)
BACTERIA URNS QL MICRO: ABNORMAL /HPF
BASOPHILS # BLD: 0.1 K/UL (ref 0–0.1)
BASOPHILS NFR BLD: 1 % (ref 0–1)
BILIRUB SERPL-MCNC: 0.3 MG/DL (ref 0.2–1)
BILIRUB UR QL: NEGATIVE
BUN SERPL-MCNC: 11 MG/DL (ref 6–20)
BUN/CREAT SERPL: 25 (ref 12–20)
CALCIUM SERPL-MCNC: 5.3 MG/DL (ref 8.5–10.1)
CHLORIDE SERPL-SCNC: 124 MMOL/L (ref 97–108)
CO2 SERPL-SCNC: 13 MMOL/L (ref 21–32)
COLOR UR: ABNORMAL
CREAT SERPL-MCNC: 0.44 MG/DL (ref 0.7–1.3)
DIFFERENTIAL METHOD BLD: ABNORMAL
EOSINOPHIL # BLD: 0.2 K/UL (ref 0–0.4)
EOSINOPHIL NFR BLD: 2 % (ref 0–7)
EPITH CASTS URNS QL MICRO: ABNORMAL /LPF
ERYTHROCYTE [DISTWIDTH] IN BLOOD BY AUTOMATED COUNT: 15.3 % (ref 11.5–14.5)
GLOBULIN SER CALC-MCNC: 2.3 G/DL (ref 2–4)
GLUCOSE BLD STRIP.AUTO-MCNC: 86 MG/DL (ref 65–117)
GLUCOSE SERPL-MCNC: 65 MG/DL (ref 65–100)
GLUCOSE UR STRIP.AUTO-MCNC: NEGATIVE MG/DL
HCT VFR BLD AUTO: 41.8 % (ref 36.6–50.3)
HGB BLD-MCNC: 13.7 G/DL (ref 12.1–17)
HGB UR QL STRIP: ABNORMAL
IMM GRANULOCYTES # BLD AUTO: 0 K/UL (ref 0–0.04)
IMM GRANULOCYTES NFR BLD AUTO: 0 % (ref 0–0.5)
KETONES UR QL STRIP.AUTO: NEGATIVE MG/DL
LACTATE BLD-SCNC: 1.66 MMOL/L (ref 0.4–2)
LEUKOCYTE ESTERASE UR QL STRIP.AUTO: ABNORMAL
LYMPHOCYTES # BLD: 2.8 K/UL (ref 0.8–3.5)
LYMPHOCYTES NFR BLD: 32 % (ref 12–49)
MCH RBC QN AUTO: 28.6 PG (ref 26–34)
MCHC RBC AUTO-ENTMCNC: 32.8 G/DL (ref 30–36.5)
MCV RBC AUTO: 87.3 FL (ref 80–99)
MONOCYTES # BLD: 0.8 K/UL (ref 0–1)
MONOCYTES NFR BLD: 9 % (ref 5–13)
NEUTS SEG # BLD: 5 K/UL (ref 1.8–8)
NEUTS SEG NFR BLD: 56 % (ref 32–75)
NITRITE UR QL STRIP.AUTO: POSITIVE
NRBC # BLD: 0 K/UL (ref 0–0.01)
NRBC BLD-RTO: 0 PER 100 WBC
PH UR STRIP: 5.5 [PH] (ref 5–8)
PLATELET # BLD AUTO: 288 K/UL (ref 150–400)
PMV BLD AUTO: 9.8 FL (ref 8.9–12.9)
POTASSIUM SERPL-SCNC: 3.3 MMOL/L (ref 3.5–5.1)
PROT SERPL-MCNC: 3.5 G/DL (ref 6.4–8.2)
PROT UR STRIP-MCNC: ABNORMAL MG/DL
RBC # BLD AUTO: 4.79 M/UL (ref 4.1–5.7)
RBC #/AREA URNS HPF: ABNORMAL /HPF (ref 0–5)
SERVICE CMNT-IMP: NORMAL
SODIUM SERPL-SCNC: 143 MMOL/L (ref 136–145)
SP GR UR REFRACTOMETRY: 1.01 (ref 1–1.03)
TROPONIN-HIGH SENSITIVITY: 6 NG/L (ref 0–76)
UA: UC IF INDICATED,UAUC: ABNORMAL
UROBILINOGEN UR QL STRIP.AUTO: 0.2 EU/DL (ref 0.2–1)
WBC # BLD AUTO: 8.8 K/UL (ref 4.1–11.1)
WBC URNS QL MICRO: >100 /HPF (ref 0–4)

## 2021-11-27 PROCEDURE — 87040 BLOOD CULTURE FOR BACTERIA: CPT

## 2021-11-27 PROCEDURE — 81001 URINALYSIS AUTO W/SCOPE: CPT

## 2021-11-27 PROCEDURE — 71045 X-RAY EXAM CHEST 1 VIEW: CPT

## 2021-11-27 PROCEDURE — 96361 HYDRATE IV INFUSION ADD-ON: CPT

## 2021-11-27 PROCEDURE — 85025 COMPLETE CBC W/AUTO DIFF WBC: CPT

## 2021-11-27 PROCEDURE — 83605 ASSAY OF LACTIC ACID: CPT

## 2021-11-27 PROCEDURE — 99285 EMERGENCY DEPT VISIT HI MDM: CPT

## 2021-11-27 PROCEDURE — 87077 CULTURE AEROBIC IDENTIFY: CPT

## 2021-11-27 PROCEDURE — 36415 COLL VENOUS BLD VENIPUNCTURE: CPT

## 2021-11-27 PROCEDURE — 87186 SC STD MICRODIL/AGAR DIL: CPT

## 2021-11-27 PROCEDURE — 74011000250 HC RX REV CODE- 250: Performed by: INTERNAL MEDICINE

## 2021-11-27 PROCEDURE — 74011250636 HC RX REV CODE- 250/636: Performed by: EMERGENCY MEDICINE

## 2021-11-27 PROCEDURE — 87086 URINE CULTURE/COLONY COUNT: CPT

## 2021-11-27 PROCEDURE — 96365 THER/PROPH/DIAG IV INF INIT: CPT

## 2021-11-27 PROCEDURE — 93005 ELECTROCARDIOGRAM TRACING: CPT

## 2021-11-27 PROCEDURE — 65660000000 HC RM CCU STEPDOWN

## 2021-11-27 PROCEDURE — 82962 GLUCOSE BLOOD TEST: CPT

## 2021-11-27 PROCEDURE — 84484 ASSAY OF TROPONIN QUANT: CPT

## 2021-11-27 PROCEDURE — 74011000258 HC RX REV CODE- 258: Performed by: EMERGENCY MEDICINE

## 2021-11-27 PROCEDURE — 80053 COMPREHEN METABOLIC PANEL: CPT

## 2021-11-27 PROCEDURE — 74011250637 HC RX REV CODE- 250/637: Performed by: INTERNAL MEDICINE

## 2021-11-27 RX ORDER — ONDANSETRON 4 MG/1
4 TABLET, ORALLY DISINTEGRATING ORAL
Status: DISCONTINUED | OUTPATIENT
Start: 2021-11-27 | End: 2021-11-30 | Stop reason: HOSPADM

## 2021-11-27 RX ORDER — TAMSULOSIN HYDROCHLORIDE 0.4 MG/1
0.4 CAPSULE ORAL DAILY
Status: DISCONTINUED | OUTPATIENT
Start: 2021-11-28 | End: 2021-11-30 | Stop reason: HOSPADM

## 2021-11-27 RX ORDER — TIMOLOL MALEATE 5 MG/ML
1 SOLUTION/ DROPS OPHTHALMIC 2 TIMES DAILY
Status: DISCONTINUED | OUTPATIENT
Start: 2021-11-27 | End: 2021-11-30 | Stop reason: HOSPADM

## 2021-11-27 RX ORDER — BRIMONIDINE TARTRATE 2 MG/ML
1 SOLUTION/ DROPS OPHTHALMIC EVERY 8 HOURS
Status: DISCONTINUED | OUTPATIENT
Start: 2021-11-27 | End: 2021-11-30 | Stop reason: HOSPADM

## 2021-11-27 RX ORDER — ONDANSETRON 2 MG/ML
4 INJECTION INTRAMUSCULAR; INTRAVENOUS
Status: DISCONTINUED | OUTPATIENT
Start: 2021-11-27 | End: 2021-11-30 | Stop reason: HOSPADM

## 2021-11-27 RX ORDER — POTASSIUM CHLORIDE 750 MG/1
20 TABLET, FILM COATED, EXTENDED RELEASE ORAL
Status: COMPLETED | OUTPATIENT
Start: 2021-11-27 | End: 2021-11-27

## 2021-11-27 RX ORDER — SODIUM CHLORIDE 0.9 % (FLUSH) 0.9 %
5-40 SYRINGE (ML) INJECTION AS NEEDED
Status: DISCONTINUED | OUTPATIENT
Start: 2021-11-27 | End: 2021-11-30 | Stop reason: HOSPADM

## 2021-11-27 RX ORDER — DORZOLAMIDE HCL 20 MG/ML
1 SOLUTION/ DROPS OPHTHALMIC 3 TIMES DAILY
Status: DISCONTINUED | OUTPATIENT
Start: 2021-11-27 | End: 2021-11-30 | Stop reason: HOSPADM

## 2021-11-27 RX ORDER — DONEPEZIL HYDROCHLORIDE 5 MG/1
10 TABLET, FILM COATED ORAL
Status: DISCONTINUED | OUTPATIENT
Start: 2021-11-27 | End: 2021-11-30 | Stop reason: HOSPADM

## 2021-11-27 RX ORDER — ASPIRIN 81 MG/1
81 TABLET ORAL DAILY
Status: DISCONTINUED | OUTPATIENT
Start: 2021-11-28 | End: 2021-11-30 | Stop reason: HOSPADM

## 2021-11-27 RX ORDER — CALCIUM GLUCONATE 94 MG/ML
1 INJECTION, SOLUTION INTRAVENOUS
Status: COMPLETED | OUTPATIENT
Start: 2021-11-27 | End: 2021-11-27

## 2021-11-27 RX ORDER — LATANOPROST 50 UG/ML
1 SOLUTION/ DROPS OPHTHALMIC EVERY EVENING
Status: DISCONTINUED | OUTPATIENT
Start: 2021-11-27 | End: 2021-11-30 | Stop reason: HOSPADM

## 2021-11-27 RX ORDER — LEVOTHYROXINE SODIUM 88 UG/1
TABLET ORAL
COMMUNITY
Start: 2021-11-21 | End: 2021-11-30

## 2021-11-27 RX ORDER — CALCIUM GLUCONATE 94 MG/ML
1 INJECTION, SOLUTION INTRAVENOUS
Status: DISPENSED | OUTPATIENT
Start: 2021-11-27 | End: 2021-11-28

## 2021-11-27 RX ORDER — ACETAMINOPHEN 325 MG/1
650 TABLET ORAL
Status: DISCONTINUED | OUTPATIENT
Start: 2021-11-27 | End: 2021-11-30 | Stop reason: HOSPADM

## 2021-11-27 RX ORDER — EZETIMIBE 10 MG/1
10 TABLET ORAL DAILY
Status: DISCONTINUED | OUTPATIENT
Start: 2021-11-28 | End: 2021-11-30 | Stop reason: HOSPADM

## 2021-11-27 RX ORDER — ENOXAPARIN SODIUM 100 MG/ML
40 INJECTION SUBCUTANEOUS DAILY
Status: DISCONTINUED | OUTPATIENT
Start: 2021-11-28 | End: 2021-11-30 | Stop reason: HOSPADM

## 2021-11-27 RX ORDER — NITROGLYCERIN 0.4 MG/1
0.4 TABLET SUBLINGUAL
Status: DISCONTINUED | OUTPATIENT
Start: 2021-11-27 | End: 2021-11-30 | Stop reason: HOSPADM

## 2021-11-27 RX ORDER — FINASTERIDE 5 MG/1
5 TABLET, FILM COATED ORAL DAILY
Status: DISCONTINUED | OUTPATIENT
Start: 2021-11-28 | End: 2021-11-30 | Stop reason: HOSPADM

## 2021-11-27 RX ORDER — SODIUM CHLORIDE 0.9 % (FLUSH) 0.9 %
5-40 SYRINGE (ML) INJECTION EVERY 8 HOURS
Status: DISCONTINUED | OUTPATIENT
Start: 2021-11-27 | End: 2021-11-30 | Stop reason: HOSPADM

## 2021-11-27 RX ORDER — ACETAMINOPHEN 650 MG/1
650 SUPPOSITORY RECTAL
Status: DISCONTINUED | OUTPATIENT
Start: 2021-11-27 | End: 2021-11-30 | Stop reason: HOSPADM

## 2021-11-27 RX ORDER — POLYETHYLENE GLYCOL 3350 17 G/17G
17 POWDER, FOR SOLUTION ORAL DAILY PRN
Status: DISCONTINUED | OUTPATIENT
Start: 2021-11-27 | End: 2021-11-30 | Stop reason: HOSPADM

## 2021-11-27 RX ORDER — ACETAMINOPHEN 325 MG/1
650 TABLET ORAL
Status: DISCONTINUED | OUTPATIENT
Start: 2021-11-27 | End: 2021-11-28

## 2021-11-27 RX ORDER — ISOSORBIDE MONONITRATE 20 MG/1
20 TABLET ORAL 2 TIMES DAILY
Status: DISCONTINUED | OUTPATIENT
Start: 2021-11-28 | End: 2021-11-30 | Stop reason: HOSPADM

## 2021-11-27 RX ORDER — LEVOTHYROXINE SODIUM 88 UG/1
88 TABLET ORAL
Status: DISCONTINUED | OUTPATIENT
Start: 2021-11-28 | End: 2021-11-29

## 2021-11-27 RX ADMIN — LATANOPROST 1 DROP: 50 SOLUTION OPHTHALMIC at 20:24

## 2021-11-27 RX ADMIN — TIMOLOL MALEATE 1 DROP: 5 SOLUTION OPHTHALMIC at 20:27

## 2021-11-27 RX ADMIN — SODIUM BICARBONATE: 84 INJECTION, SOLUTION INTRAVENOUS at 17:03

## 2021-11-27 RX ADMIN — CEFTRIAXONE 1 G: 1 INJECTION, POWDER, FOR SOLUTION INTRAMUSCULAR; INTRAVENOUS at 14:16

## 2021-11-27 RX ADMIN — CALCIUM GLUCONATE 1 G: 98 INJECTION, SOLUTION INTRAVENOUS at 16:22

## 2021-11-27 RX ADMIN — DORZOLAMIDE HYDROCHLORIDE 1 DROP: 20 SOLUTION/ DROPS OPHTHALMIC at 22:05

## 2021-11-27 RX ADMIN — DONEPEZIL HYDROCHLORIDE 10 MG: 5 TABLET, FILM COATED ORAL at 22:04

## 2021-11-27 RX ADMIN — BRIMONIDINE TARTRATE 1 DROP: 2 SOLUTION OPHTHALMIC at 22:05

## 2021-11-27 RX ADMIN — SODIUM CHLORIDE 1000 ML: 9 INJECTION, SOLUTION INTRAVENOUS at 15:02

## 2021-11-27 RX ADMIN — POTASSIUM CHLORIDE 20 MEQ: 750 TABLET, FILM COATED, EXTENDED RELEASE ORAL at 16:22

## 2021-11-27 RX ADMIN — Medication 10 ML: at 22:05

## 2021-11-27 RX ADMIN — Medication 10 ML: at 17:03

## 2021-11-27 RX ADMIN — SODIUM CHLORIDE 1000 ML: 9 INJECTION, SOLUTION INTRAVENOUS at 14:06

## 2021-11-27 NOTE — ED PROVIDER NOTES
EMERGENCY DEPARTMENT HISTORY AND PHYSICAL EXAM      Date: 11/27/2021  Patient Name: Alonso Hernandez  Patient Age and Sex: 80 y.o. male     History of Presenting Illness     Chief Complaint   Patient presents with    Hypotension     nurse at the memory unit called for low BP systolic in the 26T         History Provided By: Patient, ems    HPI: Aolnso Hernandez is a 80-year-old male with a history of CAD, hypertension, presenting for hypotension. According to EMS, patient lives at the memory care unit at a skilled nursing facility and was called today because his blood pressures were low. They reported his systolics were in the 35D and EMS reported they got around [de-identified]. He was also very bradycardic and his heart rate would go down to as low as 30s. Here patient states that he is just been feeling fatigued but denies any chest pain, abdominal pain, nausea or vomiting. He states that he does feel tired. Denies any diarrhea or urinary symptoms. There are no other complaints, changes, or physical findings at this time. PCP: Lanie Obando MD    No current facility-administered medications on file prior to encounter. Current Outpatient Medications on File Prior to Encounter   Medication Sig Dispense Refill    Unithroid 88 mcg tablet       isosorbide mononitrate (ISMO, MONOKET) 20 mg tablet TAKE 1 TABLET BY MOUTH IN THE MORNING AND 1 TABLET BY MOUTH AT MID-DAY 60 Tablet 2    amLODIPine (NORVASC) 5 mg tablet TAKE 1 TABLET BY MOUTH TWICE A DAY 60 Tablet 5    ezetimibe (ZETIA) 10 mg tablet TAKE 1 TABLET BY MOUTH AT NIGHT 30 Tablet 5    levothyroxine (SYNTHROID) 75 mcg tablet Take 75 mcg by mouth Daily (before breakfast).  lisinopriL (PRINIVIL, ZESTRIL) 10 mg tablet Take 10 mg by mouth daily.  co-enzyme Q-10 (CO Q-10) 100 mg capsule Take 100 mg by mouth daily. With a meal      amoxicillin-clavulanate (Augmentin) 875-125 mg per tablet Take 1 Tab by mouth every twelve (12) hours.  Until complete      cholecalciferol (VITAMIN D3) (5000 Units/125 mcg) tab tablet Take 5,000 Units by mouth daily.  donepeziL (ARICEPT) 10 mg tablet Take 10 mg by mouth nightly.  aspirin delayed-release 81 mg tablet Take 81 mg by mouth daily.  nitroglycerin (NITROSTAT) 0.4 mg SL tablet 1 Tab by SubLINGual route every five (5) minutes as needed for Chest Pain (max 3 tabs daily for chest pain). Up to 3 doses. 1 Bottle 1    timolol (TIMOPTIC) 0.5 % ophthalmic solution Administer 1 Drop to both eyes two (2) times a day.  bimatoprost (LUMIGAN) 0.03 % ophthalmic drops Administer 1 Drop to both eyes nightly.  acetaminophen (TYLENOL EXTRA STRENGTH) 500 mg tablet Take 500 mg by mouth every six (6) hours as needed for Pain.  brinzolamide-brimonidine (SIMBRINZA) 1-0.2 % drps Administer 1 Drop to both eyes two (2) times a day.  finasteride (PROSCAR) 5 mg tablet Take 5 mg by mouth daily.  silodosin (RAPAFLO) 8 mg capsule Take 8 mg by mouth nightly.          Past History     Past Medical History:  Past Medical History:   Diagnosis Date    Arthritis     SHOULDERS AND SPINE    CAD (coronary artery disease) 7/1998    MI CARDIAC CATH, STENT X 2    Cancer ([address] 04/2008    chronic lymphocytic leukemia    Cancer ([address]     multiple skin cancers     Chest pain, unspecified     Chronic pain     L 2 LYTIC LESION    Edema     Hypertension     Ill-defined condition     GLUCOMA    MI (myocardial infarction) ([address]     Postsurgical percutaneous transluminal coronary angioplasty status 6/4/2012    Pre-operative cardiovascular examination     Thyroid disease     HYPO       Past Surgical History:  Past Surgical History:   Procedure Laterality Date    CARDIAC SURG PROCEDURE UNLIST  7/1998    2 Cardiac stents    EGD  6/7/2010         HX ADENOIDECTOMY  1936    HX APPENDECTOMY  1954    HX CATARACT REMOVAL      bilateral    HX ORTHOPAEDIC  4/15/2008    Right hip replacement    HX OTHER SURGICAL 2011    Moh's procedure,     HX OTHER SURGICAL      MULTIPLES SKIN CANCER REMOVALS, MOSTLY ON HEAD    HX SKIN BIOPSY  2018    on scalp     HX TONSILLECTOMY  1936    89       Family History:  Family History   Problem Relation Age of Onset    Diabetes Mother     Heart Disease Father     Heart Attack Father 80    Lung Disease Sister         PULMONARY FIBROSIS    Heart Attack Brother     No Known Problems Sister     Anesth Problems Neg Hx        Social History:  Social History     Tobacco Use    Smoking status: Former Smoker     Packs/day: 0.50     Years: 15.00     Pack years: 7.50     Types: Cigarettes     Quit date: 1974     Years since quittin.9    Smokeless tobacco: Never Used   Substance Use Topics    Alcohol use: No    Drug use: No       Allergies: Allergies   Allergen Reactions    Statins-Hmg-Coa Reductase Inhibitors Myalgia and Other (comments)     Causes aching, generalized         Review of Systems   Review of Systems   Constitutional: Positive for fatigue. Negative for chills and fever. Respiratory: Negative for cough and shortness of breath. Cardiovascular: Negative for chest pain. Gastrointestinal: Negative for abdominal pain, constipation, diarrhea, nausea and vomiting. Genitourinary: Negative for dysuria, frequency and hematuria. Neurological: Negative for weakness and numbness. All other systems reviewed and are negative. Physical Exam   Physical Exam  Vitals and nursing note reviewed. Constitutional:       Appearance: He is well-developed. HENT:      Head: Normocephalic and atraumatic. Nose: Nose normal.      Mouth/Throat:      Mouth: Mucous membranes are dry. Eyes:      Extraocular Movements: Extraocular movements intact. Conjunctiva/sclera: Conjunctivae normal.   Cardiovascular:      Rate and Rhythm: Normal rate and regular rhythm. Pulmonary:      Effort: Pulmonary effort is normal. No respiratory distress.       Breath sounds: Normal breath sounds. Abdominal:      General: There is no distension. Palpations: Abdomen is soft. Tenderness: There is no abdominal tenderness. Musculoskeletal:         General: Normal range of motion. Cervical back: Normal range of motion and neck supple. Skin:     General: Skin is warm and dry. Neurological:      General: No focal deficit present. Mental Status: He is alert and oriented to person, place, and time. Mental status is at baseline. Comments: Patient is oriented to person, birthday, place, month. Unable to tell me the year   Psychiatric:         Mood and Affect: Mood normal.          Diagnostic Study Results     Labs -     Recent Results (from the past 12 hour(s))   EKG, 12 LEAD, INITIAL    Collection Time: 11/27/21 12:53 PM   Result Value Ref Range    Ventricular Rate 48 BPM    Atrial Rate 48 BPM    P-R Interval 210 ms    QRS Duration 84 ms    Q-T Interval 456 ms    QTC Calculation (Bezet) 407 ms    Calculated P Axis 79 degrees    Calculated R Axis -22 degrees    Calculated T Axis 27 degrees    Diagnosis       Sinus bradycardia with 1st degree AV block  Low voltage QRS  When compared with ECG of 02-MAR-2021 20:54,  No significant change was found     CBC WITH AUTOMATED DIFF    Collection Time: 11/27/21  1:08 PM   Result Value Ref Range    WBC 8.8 4.1 - 11.1 K/uL    RBC 4.79 4.10 - 5.70 M/uL    HGB 13.7 12.1 - 17.0 g/dL    HCT 41.8 36.6 - 50.3 %    MCV 87.3 80.0 - 99.0 FL    MCH 28.6 26.0 - 34.0 PG    MCHC 32.8 30.0 - 36.5 g/dL    RDW 15.3 (H) 11.5 - 14.5 %    PLATELET 638 820 - 448 K/uL    MPV 9.8 8.9 - 12.9 FL    NRBC 0.0 0  WBC    ABSOLUTE NRBC 0.00 0.00 - 0.01 K/uL    NEUTROPHILS 56 32 - 75 %    LYMPHOCYTES 32 12 - 49 %    MONOCYTES 9 5 - 13 %    EOSINOPHILS 2 0 - 7 %    BASOPHILS 1 0 - 1 %    IMMATURE GRANULOCYTES 0 0.0 - 0.5 %    ABS. NEUTROPHILS 5.0 1.8 - 8.0 K/UL    ABS. LYMPHOCYTES 2.8 0.8 - 3.5 K/UL    ABS. MONOCYTES 0.8 0.0 - 1.0 K/UL    ABS. EOSINOPHILS 0.2 0.0 - 0.4 K/UL    ABS. BASOPHILS 0.1 0.0 - 0.1 K/UL    ABS. IMM. GRANS. 0.0 0.00 - 0.04 K/UL    DF AUTOMATED     URINALYSIS W/ REFLEX CULTURE    Collection Time: 11/27/21  1:08 PM    Specimen: Urine   Result Value Ref Range    Color YELLOW/STRAW      Appearance TURBID (A) CLEAR      Specific gravity 1.014 1.003 - 1.030      pH (UA) 5.5 5.0 - 8.0      Protein TRACE (A) NEG mg/dL    Glucose Negative NEG mg/dL    Ketone Negative NEG mg/dL    Bilirubin Negative NEG      Blood MODERATE (A) NEG      Urobilinogen 0.2 0.2 - 1.0 EU/dL    Nitrites Positive (A) NEG      Leukocyte Esterase LARGE (A) NEG      WBC >100 (H) 0 - 4 /hpf    RBC 0-5 0 - 5 /hpf    Epithelial cells FEW FEW /lpf    Bacteria 4+ (A) NEG /hpf    UA:UC IF INDICATED URINE CULTURE ORDERED (A) CNI     POC LACTIC ACID    Collection Time: 11/27/21  1:25 PM   Result Value Ref Range    Lactic Acid (POC) 1.66 0.40 - 2.00 mmol/L   TROPONIN-HIGH SENSITIVITY    Collection Time: 11/27/21  2:09 PM   Result Value Ref Range    Troponin-High Sensitivity 6 0 - 76 ng/L   METABOLIC PANEL, COMPREHENSIVE    Collection Time: 11/27/21  2:57 PM   Result Value Ref Range    Sodium 143 136 - 145 mmol/L    Potassium 3.3 (L) 3.5 - 5.1 mmol/L    Chloride 124 (H) 97 - 108 mmol/L    CO2 13 (LL) 21 - 32 mmol/L    Anion gap 6 5 - 15 mmol/L    Glucose 65 65 - 100 mg/dL    BUN 11 6 - 20 MG/DL    Creatinine 0.44 (L) 0.70 - 1.30 MG/DL    BUN/Creatinine ratio 25 (H) 12 - 20      GFR est AA >60 >60 ml/min/1.73m2    GFR est non-AA >60 >60 ml/min/1.73m2    Calcium 5.3 (LL) 8.5 - 10.1 MG/DL    Bilirubin, total 0.3 0.2 - 1.0 MG/DL    ALT (SGPT) 9 (L) 12 - 78 U/L    AST (SGOT) 20 15 - 37 U/L    Alk. phosphatase 46 45 - 117 U/L    Protein, total 3.5 (L) 6.4 - 8.2 g/dL    Albumin 1.2 (L) 3.5 - 5.0 g/dL    Globulin 2.3 2.0 - 4.0 g/dL    A-G Ratio 0.5 (L) 1.1 - 2.2         Radiologic Studies -   XR CHEST PORT   Final Result      New left lung base atelectasis versus pneumonia.  Unchanged chronic interstitial   lung disease. Consider PA and lateral chest views when the patient can better   tolerate. CT Results  (Last 48 hours)    None        CXR Results  (Last 48 hours)               11/27/21 1346  XR CHEST PORT Final result    Impression:      New left lung base atelectasis versus pneumonia. Unchanged chronic interstitial   lung disease. Consider PA and lateral chest views when the patient can better   tolerate. Narrative:  EXAM: XR CHEST PORT       INDICATION: Hypotension. COMPARISON: Portable chest on 5/6/2021 and 5/30/2020. CT chest on 6/9/2015. TECHNIQUE: Upright portable chest AP view       FINDINGS: Cardiac monitoring wires overlie the thorax. The cardiomediastinal and   hilar contours are within normal limits. The pulmonary vasculature is within   normal limits. Subtle left lung base opacity is new. Reticular interstitial opacities are mild   and unchanged. No pneumothorax. Bones are osteopenic. Medical Decision Making   I am the first provider for this patient. I reviewed the vital signs, available nursing notes, past medical history, past surgical history, family history and social history. Vital Signs-Reviewed the patient's vital signs. Patient Vitals for the past 12 hrs:   Temp Pulse Resp BP SpO2   11/27/21 1400 97.4 °F (36.3 °C) (!) 47 13 (!) 98/51 95 %   11/27/21 1333     97 %   11/27/21 1325  (!) 53 17 95/74 94 %   11/27/21 1310  (!) 48 18 (!) 90/51 95 %   11/27/21 1255  (!) 45 17 (!) 96/46 97 %   11/27/21 1245 (!) 96.6 °F (35.9 °C) (!) 43 14 (!) 96/46 97 %       Records Reviewed: Nursing Notes and Old Medical Records    Provider Notes (Medical Decision Making):   Patient with a history of very mild dementia presenting for low blood pressure. Differential includes hypotension secondary to dehydration, malnourishment, infection, blood loss.   Will get labs, electrolytes, give him some IV fluids and get sepsis labs to start.    ED Course:   Initial assessment performed. The patients presenting problems have been discussed, and they are in agreement with the care plan formulated and outlined with them. I have encouraged them to ask questions as they arise throughout their visit. ED Course as of 11/27/21 1552   Sat Nov 27, 2021   1311 EKG interpreted by me. Shows sinus bradycardia with first-degree heart block but no other block. No ST elevations or depressions. [JS]   1073 Patient son now at bedside and states that patient is a full code as he has expressed before the dementia that he wanted everything to be done. They have not had a conversation since he started having dementia symptoms. Right now would want to be full code until he can have a conversation with him. I did advise the son that patient does have acute cystitis. Also appeared very dehydrated and patient's son states that patient hates drinking water at [facility]. [JS]   4337 Patient's calcium came back very low at 5.3 so calcium replacement ordered. Bicarb also 13 but no signs of any hyperglycemia, lactic acidosis and other electrolytes normal.  Hospitalist to order bicarb gtt. [JS]      ED Course User Index  [JS] Tonny Ornelas MD     Critical Care Time:   CRITICAL CARE NOTE :    1:01 PM    IMPENDING DETERIORATION -Cardiovascular and Renal  ASSOCIATED RISK FACTORS - Hypotension, Shock and Dysrhythmia, metaboloic  MANAGEMENT- Bedside Assessment and Supervision of Care  INTERPRETATION -  Xrays, ECG, Blood Pressure and Cardiac Output Measures   INTERVENTIONS - hemodynamic mngmt, metabolic interventions  CASE REVIEW - Hospitalist/Intensivist, Nursing and Family  TREATMENT RESPONSE -Improved  PERFORMED BY - Self    NOTES   :    I have spent 50 minutes of critical care time involved in lab review, consultations with specialist, family decision- making, bedside attention and documentation.  During this entire length of time I was immediately available to the patient . Disposition:    Admission Note:  Patient is being admitted to the hospital by Dr. Zack Kline, Service: Hospitalist.  The results of their tests and reasons for their admission have been discussed with them and available family. They convey agreement and understanding for the need to be admitted and for their admission diagnosis. Diagnosis     Clinical Impression:   1. Hypotension due to hypovolemia    2. Acute cystitis without hematuria    3. Hypothermia, initial encounter    4. Hypocalcemia    5. Low bicarbonate        Attestations:  Gavin Brush M.D. Please note that this dictation was completed with [employer], the [employer] voice recognition software. Quite often unanticipated grammatical, syntax, homophones, and other interpretive errors are inadvertently transcribed by the computer software. Please disregard these errors. Please excuse any errors that have escaped final proofreading. Thank you.

## 2021-11-27 NOTE — ACP (ADVANCE CARE PLANNING)
Advance Care Planning Note      NAME: Michael Milian   :  1929   MRN:  [id number]     Date/Time:  2021 4:21 PM    Active Diagnoses:  Hospital Problems  Date Reviewed: 2021          Codes Class Noted POA    UTI (urinary tract infection) ICD-10-CM: N39.0  ICD-9-CM: 599.0  2021 Yes        Hypotension ICD-10-CM: I95.9  ICD-9-CM: 458.9  2021 Yes        Sepsis ([facility] [address] ICD-10-CM: A41.9  ICD-9-CM: 038.9, 995.91  2021 Yes        Hypocalcemia ICD-10-CM: E83.51  ICD-9-CM: 275.41  2021 Yes        Hypokalemia ICD-10-CM: E87.6  ICD-9-CM: 276.8  2021 Yes        Hyperchloremic metabolic acidosis [id number]-[id number]: E87.2  ICD-9-CM: 276.2  2021 Yes        Dementia ([address] (Chronic) ICD-10-CM: F03.90  ICD-9-CM: 294.20  2021 Yes        Hypothyroidism (Chronic) ICD-10-CM: E03.9  ICD-9-CM: 244.9  2021 Yes        SCC (squamous cell carcinoma), scalp/neck (Chronic) ICD-10-CM: C44.42  ICD-9-CM: 173.42  2018 Yes              These active diagnoses are of sufficient risk that focused discussion on advance care planning is indicated in order to allow the patient to thoughtfully consider personal goals of care, and if situations arise that prevent the ability to personally give input, to ensure appropriate representation of their personal desires for different levels and aggressiveness of care. Discussion:   Code status addressed and wants to be a Full Code. Patient wants central line and vasopressors if needed. Patient would also want a feeding tube, if needed, for nutritional support. Patient  would like to assign Miguel Angel Bach # [phone]   as the surrogate decision maker. Persons present and participating in discussion: Whitney Blackburn MD, Miguel Angel Hopkins      Time Spent:   Total time spent face-to-face in education and discussion:   16  minutes.          Junito Cui MD   Hospitalist

## 2021-11-27 NOTE — H&P
Hospitalist Admission Note    NAME: Jailyn Brown   :  1929   MRN:  [id number]     Date/Time:  2021 3:33 PM    Patient PCP: Jenifer Boss MD  ______________________________________________________________________  Given the patient's current clinical presentation, I have a high level of concern for decompensation if discharged from the emergency department. Complex decision making was performed, which includes reviewing the patient's available past medical records, laboratory results, and x-ray films. My assessment of this patient's clinical condition and my plan of care is as follows. Assessment / Plan:  Generalized weakness with hypotension POA  Due to UTI POA  Hypokalemia POA  Hyperchloremic non-anion gap metabolic acidosis POA  Chronic hypoalbuminemia  Lactic 1.66  Potassium 3.3  CO2 13  Chloride 124  Albumin 1.2  WBC 8.8  Chest x-ray left lower lobe atelectasis likely with chronic interstitial lung disease pattern noted  UA positive for nitrite  Urine culture pending  Blood culture pending    Admit to remote telemetry bed  Status post 2 L normal saline bolus in ED repeat, Start bicarb drip in dextrose and half-normal saline fluid base for now  Replenish potassium p.o. x1  BMP in a.m.   Status post ceftriaxone 1 g x 1 in ED, will continue 1 g daily for now  Follow urine culture, blood culture  Status post IV calcium 1 g x 1 in ED, corrected calcium close to normal with hypoalbuminemia, will hold off further calcium for now  Inpatient PT OT eval for DC planning    Hypothyroidism  CAD status post stent  Continue Synthroid  Continue isosorbide twice daily as tolerated  Hold home amlodipine,  Lisinopril  Continue home nitroglycerin as needed, statin    Glaucoma  Continue home eyedrops    CLL  Squamous cell carcinoma of the scalp  Continue wound care    Dementia  Continue Aricept  Plan is to get back to memory care unit at [facility] on discharge per son        Code Status: Full code per wishes of the patient in ED, confirmed by son  Surrogate Decision Maker: Son Freddie Redman #[phone]    DVT Prophylaxis: Subcu heparin  GI Prophylaxis: not indicated    Baseline: Patient is currently living at memory care unit at [facility] due to progressive worsening dementia      Subjective:   CHIEF COMPLAINT: Generalized weakness with hypotension at [facility]    HISTORY OF PRESENT ILLNESS:     Amy Mcneil is a 80 y.o.  male who presents with above complaints from [facility] via EMS with son. Patient was brought in by son from the [facility] after he was found to be severely generalized weak with lethargy when seen by him yesterday. Patient was found to be hypotensive by the nursing staff at the [facility] for which he was sent to the ER for further evaluation. Patient was found to have hypokalemia with hyperchloremic non-anion gap metabolic acidosis with UA positive for nitrite on work-up in ED. Patient had questionable left lower lobe airspace disease likely to be atelectasis on chronic interstitial lung disease on imaging in ED. We were asked to admit for work up and evaluation of the above problems.      Past Medical History:   Diagnosis Date    Arthritis     SHOULDERS AND SPINE    CAD (coronary artery disease) 7/1998    MI CARDIAC CATH, STENT X 2    Cancer ([address] 04/2008    chronic lymphocytic leukemia    Cancer ([address]     multiple skin cancers     Chest pain, unspecified     Chronic pain     L 2 LYTIC LESION    Edema     Hypertension     Ill-defined condition     GLUCOMA    MI (myocardial infarction) ([address]     Postsurgical percutaneous transluminal coronary angioplasty status 6/4/2012    Pre-operative cardiovascular examination     Thyroid disease     HYPO        Past Surgical History:   Procedure Laterality Date    CARDIAC SURG PROCEDURE UNLIST  7/1998    2 Cardiac stents    EGD  6/7/2010  HX ADENOIDECTOMY      HX APPENDECTOMY      HX CATARACT REMOVAL      bilateral    HX ORTHOPAEDIC  4/15/2008    Right hip replacement    HX OTHER SURGICAL  2011    Moh's procedure,     HX OTHER SURGICAL      MULTIPLES SKIN CANCER REMOVALS, MOSTLY ON HEAD    HX SKIN BIOPSY  2018    on scalp     HX TONSILLECTOMY  193    193       Social History     Tobacco Use    Smoking status: Former Smoker     Packs/day: 0.50     Years: 15.00     Pack years: 7.50     Types: Cigarettes     Quit date: 1974     Years since quittin.9    Smokeless tobacco: Never Used   Substance Use Topics    Alcohol use: No        Family History   Problem Relation Age of Onset    Diabetes Mother     Heart Disease Father     Heart Attack Father 80    Lung Disease Sister         PULMONARY FIBROSIS    Heart Attack Brother     No Known Problems Sister     Anesth Problems Neg Hx      Allergies   Allergen Reactions    Statins-Hmg-Coa Reductase Inhibitors Myalgia and Other (comments)     Causes aching, generalized        Prior to Admission medications    Medication Sig Start Date End Date Taking? Authorizing Provider   Unithroid 88 mcg tablet  21   Provider, Historical   isosorbide mononitrate (ISMO, MONOKET) 20 mg tablet TAKE 1 TABLET BY MOUTH IN THE MORNING AND 1 TABLET BY MOUTH AT MID-DAY 21   Keri PANG NP   amLODIPine (NORVASC) 5 mg tablet TAKE 1 TABLET BY MOUTH TWICE A DAY 6/15/21   Keri PANG NP   ezetimibe (ZETIA) 10 mg tablet TAKE 1 TABLET BY MOUTH AT NIGHT 21   Keri PANG NP   levothyroxine (SYNTHROID) 75 mcg tablet Take 75 mcg by mouth Daily (before breakfast). Provider, Historical   lisinopriL (PRINIVIL, ZESTRIL) 10 mg tablet Take 10 mg by mouth daily. Provider, Historical   co-enzyme Q-10 (CO Q-10) 100 mg capsule Take 100 mg by mouth daily.  With a meal    Provider, Historical   amoxicillin-clavulanate (Augmentin) 875-125 mg per tablet Take 1 Tab by mouth every twelve (12) hours. Until complete    Provider, Historical   cholecalciferol (VITAMIN D3) (5000 Units/125 mcg) tab tablet Take 5,000 Units by mouth daily. Provider, Historical   donepeziL (ARICEPT) 10 mg tablet Take 10 mg by mouth nightly. Provider, Historical   aspirin delayed-release 81 mg tablet Take 81 mg by mouth daily. Provider, Historical   nitroglycerin (NITROSTAT) 0.4 mg SL tablet 1 Tab by SubLINGual route every five (5) minutes as needed for Chest Pain (max 3 tabs daily for chest pain). Up to 3 doses. 5/11/18   Trey Roy, NAI   timolol (TIMOPTIC) 0.5 % ophthalmic solution Administer 1 Drop to both eyes two (2) times a day. Provider, Historical   bimatoprost (LUMIGAN) 0.03 % ophthalmic drops Administer 1 Drop to both eyes nightly. Provider, Historical   acetaminophen (TYLENOL EXTRA STRENGTH) 500 mg tablet Take 500 mg by mouth every six (6) hours as needed for Pain. Provider, Historical   brinzolamide-brimonidine (SIMBRINZA) 1-0.2 % drps Administer 1 Drop to both eyes two (2) times a day. Provider, Historical   finasteride (PROSCAR) 5 mg tablet Take 5 mg by mouth daily. Provider, Historical   silodosin (RAPAFLO) 8 mg capsule Take 8 mg by mouth nightly. Provider, Historical       REVIEW OF SYSTEMS:     I am not able to complete the review of systems because: The patient is intubated and sedated    The patient has altered mental status due to his acute medical problems    The patient has baseline aphasia from prior stroke(s)   x The patient has baseline dementia and is not reliable historian    The patient is in acute medical distress and unable to provide information             Objective:   VITALS:    Visit Vitals  BP (!) 98/51   Pulse (!) 47   Temp 97.4 °F (36.3 °C)   Resp 13   Ht 5' 5\" (1.651 m)   Wt 78 kg (172 lb)   SpO2 95%   BMI 28.62 kg/m²       PHYSICAL EXAM:    General:    Alert, cooperative, no distress, appears stated age.      HEENT: Atraumatic, anicteric sclerae, pink conjunctivae     No oral ulcers, mucosa dry+, throat clear, dentition fair  Neck:  Supple, symmetrical,  thyroid: non tender  Lungs:   Clear to auscultation bilaterally. No Wheezing or Rhonchi. No rales. Chest wall:  No tenderness  No Accessory muscle use. Heart:   Regular  rhythm,  No  murmur   No edema  Abdomen:   Soft, non-tender. Not distended. Bowel sounds normal  Extremities: No cyanosis. No clubbing,      Skin turgor normal, Capillary refill normal, Radial dial pulse 2+  Skin:     Not pale. Not Jaundiced  No rashes, chronic scalp squamous cell carcinoma wound noted+  Psych:  poor insight. Not depressed. Not anxious or agitated. Dementia noted+  Neurologic: EOMs intact. No facial asymmetry. No aphasia or slurred speech. Symmetrical strength, Sensation grossly intact. Alert and oriented X 2.     _______________________________________________________________________  Care Plan discussed with:    Comments   Patient x    Family  x  son Skip at bedside in ED   RN x    Care Manager                    Consultant:      _______________________________________________________________________  Expected  Disposition:   Home with Family    HH/PT/OT/RN    SNF/LTC x   [facility]    ________________________________________________________________________  TOTAL TIME:  54 Minutes    Critical Care Provided     Minutes non procedure based      Comments    x Reviewed previous records   >50% of visit spent in counseling and coordination of care x Discussion with patient and family and questions answered       ________________________________________________________________________  Signed: Jorgito Reddy MD    Procedures: see electronic medical records for all procedures/Xrays and details which were not copied into this note but were reviewed prior to creation of Plan.     LAB DATA REVIEWED:    Recent Results (from the past 24 hour(s))   EKG, 12 LEAD, INITIAL    Collection Time: 11/27/21 12:53 PM   Result Value Ref Range Ventricular Rate 48 BPM    Atrial Rate 48 BPM    P-R Interval 210 ms    QRS Duration 84 ms    Q-T Interval 456 ms    QTC Calculation (Bezet) 407 ms    Calculated P Axis 79 degrees    Calculated R Axis -22 degrees    Calculated T Axis 27 degrees    Diagnosis       Sinus bradycardia with 1st degree AV block  Low voltage QRS  When compared with ECG of 02-MAR-2021 20:54,  No significant change was found     CBC WITH AUTOMATED DIFF    Collection Time: 11/27/21  1:08 PM   Result Value Ref Range    WBC 8.8 4.1 - 11.1 K/uL    RBC 4.79 4.10 - 5.70 M/uL    HGB 13.7 12.1 - 17.0 g/dL    HCT 41.8 36.6 - 50.3 %    MCV 87.3 80.0 - 99.0 FL    MCH 28.6 26.0 - 34.0 PG    MCHC 32.8 30.0 - 36.5 g/dL    RDW 15.3 (H) 11.5 - 14.5 %    PLATELET 186 689 - 289 K/uL    MPV 9.8 8.9 - 12.9 FL    NRBC 0.0 0  WBC    ABSOLUTE NRBC 0.00 0.00 - 0.01 K/uL    NEUTROPHILS 56 32 - 75 %    LYMPHOCYTES 32 12 - 49 %    MONOCYTES 9 5 - 13 %    EOSINOPHILS 2 0 - 7 %    BASOPHILS 1 0 - 1 %    IMMATURE GRANULOCYTES 0 0.0 - 0.5 %    ABS. NEUTROPHILS 5.0 1.8 - 8.0 K/UL    ABS. LYMPHOCYTES 2.8 0.8 - 3.5 K/UL    ABS. MONOCYTES 0.8 0.0 - 1.0 K/UL    ABS. EOSINOPHILS 0.2 0.0 - 0.4 K/UL    ABS. BASOPHILS 0.1 0.0 - 0.1 K/UL    ABS. IMM.  GRANS. 0.0 0.00 - 0.04 K/UL    DF AUTOMATED     URINALYSIS W/ REFLEX CULTURE    Collection Time: 11/27/21  1:08 PM    Specimen: Urine   Result Value Ref Range    Color YELLOW/STRAW      Appearance TURBID (A) CLEAR      Specific gravity 1.014 1.003 - 1.030      pH (UA) 5.5 5.0 - 8.0      Protein TRACE (A) NEG mg/dL    Glucose Negative NEG mg/dL    Ketone Negative NEG mg/dL    Bilirubin Negative NEG      Blood MODERATE (A) NEG      Urobilinogen 0.2 0.2 - 1.0 EU/dL    Nitrites Positive (A) NEG      Leukocyte Esterase LARGE (A) NEG      WBC >100 (H) 0 - 4 /hpf    RBC 0-5 0 - 5 /hpf    Epithelial cells FEW FEW /lpf    Bacteria 4+ (A) NEG /hpf    UA:UC IF INDICATED URINE CULTURE ORDERED (A) CNI     POC LACTIC ACID    Collection Time: 11/27/21  1:25 PM   Result Value Ref Range    Lactic Acid (POC) 1.66 0.40 - 2.00 mmol/L   TROPONIN-HIGH SENSITIVITY    Collection Time: 11/27/21  2:09 PM   Result Value Ref Range    Troponin-High Sensitivity 6 0 - 76 ng/L

## 2021-11-27 NOTE — ED NOTES
Patient is being transferred to [facility] Neuro[facility] ICU, Room # [phone]. Report given to Jessica Broussard RN on Gwendolyn Burgos for routine progression of care. Report consisted of the following information SBAR, ED Summary, Intake/Output, MAR, Recent Results and Cardiac Rhythm SB-SR. Patient transferred to receiving unit by: transport tech (RN or tech name). Outstanding consults needed: No     Next labs due: No     The following personal items will be sent with the patient during transfer to the floor: All valuables:    Cardiac monitoring ordered: Yes    The following CURRENT information was reported to the receiving RN:    Code status: Full Code at time of transfer    Last set of vital signs:  Vital Signs  Level of Consciousness: Responds to Voice (1) (11/27/21 1400)  Temp: 97.4 °F (36.3 °C) (11/27/21 1400)  Temp Source: Oral (11/27/21 1400)  Pulse (Heart Rate): 71 (11/27/21 1649)  Cardiac Rhythm: Sinus Tani (50) (11/27/21 1333)  Resp Rate: 18 (11/27/21 1649)  BP: (!) 128/90 (11/27/21 1649)  MAP (Monitor): 102 (11/27/21 1649)  MAP (Calculated): 103 (11/27/21 1649)  BP 1 Location: Right upper arm (11/27/21 1245)  BP 1 Method: Automatic (11/27/21 1245)  BP Patient Position: At rest; Supine (11/27/21 1245)  MEWS Score: 2 (11/27/21 1400)         Oxygen Therapy  O2 Sat (%): 99 % (11/27/21 1649)  Pulse via Oximetry: 63 beats per minute (11/27/21 1649)  O2 Device: None (Room air) (11/27/21 1333)      Last pain assessment:  Pain 1  Pain Scale 1: Numeric (0 - 10)  Pain Intensity 1: 0      Wounds: Yes    Urinary catheter: voiding  Is there a rawls order: No     LDAs:       Peripheral IV 11/27/21 Right Antecubital (Active)         Opportunity for questions and clarification was provided.     Marcelauri Ovalle

## 2021-11-27 NOTE — PROGRESS NOTES
End of Shift Note    Bedside shift change report given to [address] [place] [address] [id number] (oncoming nurse) by Shirley Jose RN (offgoing nurse). Report included the following information SBAR, Kardex, ED Summary, Intake/Output, MAR, Accordion and Recent Results    Shift worked:  7a-7p     Shift summary and any significant changes:     Pt admitted from ED at 18:00. Dual skin done, pt with skin CA to top of head drst CDI, yeasty panus, pink urine, pt had loose BM x1. Tele first degree block. Concerns for physician to address:  Yeast under panus, could we have some nystatin powder please?      Zone phone for oncoming shift:   Angelena Lombard, RN

## 2021-11-28 LAB
ANION GAP SERPL CALC-SCNC: 5 MMOL/L (ref 5–15)
ATRIAL RATE: 48 BPM
BUN SERPL-MCNC: 12 MG/DL (ref 6–20)
BUN/CREAT SERPL: 13 (ref 12–20)
CALCIUM SERPL-MCNC: 8.2 MG/DL (ref 8.5–10.1)
CALCULATED P AXIS, ECG09: 79 DEGREES
CALCULATED R AXIS, ECG10: -22 DEGREES
CALCULATED T AXIS, ECG11: 27 DEGREES
CHLORIDE SERPL-SCNC: 110 MMOL/L (ref 97–108)
CO2 SERPL-SCNC: 24 MMOL/L (ref 21–32)
CREAT SERPL-MCNC: 0.93 MG/DL (ref 0.7–1.3)
DIAGNOSIS, 93000: NORMAL
ERYTHROCYTE [DISTWIDTH] IN BLOOD BY AUTOMATED COUNT: 15.1 % (ref 11.5–14.5)
GLUCOSE SERPL-MCNC: 145 MG/DL (ref 65–100)
HCT VFR BLD AUTO: 37.3 % (ref 36.6–50.3)
HGB BLD-MCNC: 12.8 G/DL (ref 12.1–17)
MCH RBC QN AUTO: 29.2 PG (ref 26–34)
MCHC RBC AUTO-ENTMCNC: 34.3 G/DL (ref 30–36.5)
MCV RBC AUTO: 85 FL (ref 80–99)
NRBC # BLD: 0 K/UL (ref 0–0.01)
NRBC BLD-RTO: 0 PER 100 WBC
P-R INTERVAL, ECG05: 210 MS
PLATELET # BLD AUTO: 252 K/UL (ref 150–400)
PMV BLD AUTO: 9.5 FL (ref 8.9–12.9)
POTASSIUM SERPL-SCNC: 4.5 MMOL/L (ref 3.5–5.1)
Q-T INTERVAL, ECG07: 456 MS
QRS DURATION, ECG06: 84 MS
QTC CALCULATION (BEZET), ECG08: 407 MS
RBC # BLD AUTO: 4.39 M/UL (ref 4.1–5.7)
SODIUM SERPL-SCNC: 139 MMOL/L (ref 136–145)
T4 FREE SERPL-MCNC: 1.6 NG/DL (ref 0.8–1.5)
TSH SERPL DL<=0.05 MIU/L-ACNC: 0.24 UIU/ML (ref 0.36–3.74)
VENTRICULAR RATE, ECG03: 48 BPM
WBC # BLD AUTO: 19.1 K/UL (ref 4.1–11.1)

## 2021-11-28 PROCEDURE — 74011250637 HC RX REV CODE- 250/637: Performed by: INTERNAL MEDICINE

## 2021-11-28 PROCEDURE — 36415 COLL VENOUS BLD VENIPUNCTURE: CPT

## 2021-11-28 PROCEDURE — 97535 SELF CARE MNGMENT TRAINING: CPT

## 2021-11-28 PROCEDURE — 74011000258 HC RX REV CODE- 258: Performed by: INTERNAL MEDICINE

## 2021-11-28 PROCEDURE — 74011250636 HC RX REV CODE- 250/636: Performed by: INTERNAL MEDICINE

## 2021-11-28 PROCEDURE — 80048 BASIC METABOLIC PNL TOTAL CA: CPT

## 2021-11-28 PROCEDURE — 97162 PT EVAL MOD COMPLEX 30 MIN: CPT

## 2021-11-28 PROCEDURE — 97116 GAIT TRAINING THERAPY: CPT

## 2021-11-28 PROCEDURE — 65660000000 HC RM CCU STEPDOWN

## 2021-11-28 PROCEDURE — 84439 ASSAY OF FREE THYROXINE: CPT

## 2021-11-28 PROCEDURE — 74011250636 HC RX REV CODE- 250/636: Performed by: STUDENT IN AN ORGANIZED HEALTH CARE EDUCATION/TRAINING PROGRAM

## 2021-11-28 PROCEDURE — 74011000250 HC RX REV CODE- 250: Performed by: INTERNAL MEDICINE

## 2021-11-28 PROCEDURE — 97165 OT EVAL LOW COMPLEX 30 MIN: CPT

## 2021-11-28 PROCEDURE — 84443 ASSAY THYROID STIM HORMONE: CPT

## 2021-11-28 PROCEDURE — 2709999900 HC NON-CHARGEABLE SUPPLY

## 2021-11-28 PROCEDURE — 85027 COMPLETE CBC AUTOMATED: CPT

## 2021-11-28 RX ORDER — SODIUM CHLORIDE, SODIUM LACTATE, POTASSIUM CHLORIDE, CALCIUM CHLORIDE 600; 310; 30; 20 MG/100ML; MG/100ML; MG/100ML; MG/100ML
75 INJECTION, SOLUTION INTRAVENOUS CONTINUOUS
Status: DISCONTINUED | OUTPATIENT
Start: 2021-11-28 | End: 2021-11-29

## 2021-11-28 RX ADMIN — Medication 10 ML: at 13:13

## 2021-11-28 RX ADMIN — ISOSORBIDE MONONITRATE 20 MG: 20 TABLET ORAL at 08:38

## 2021-11-28 RX ADMIN — BRIMONIDINE TARTRATE 1 DROP: 2 SOLUTION OPHTHALMIC at 13:13

## 2021-11-28 RX ADMIN — LATANOPROST 1 DROP: 50 SOLUTION OPHTHALMIC at 18:13

## 2021-11-28 RX ADMIN — SODIUM BICARBONATE: 84 INJECTION, SOLUTION INTRAVENOUS at 03:39

## 2021-11-28 RX ADMIN — EZETIMIBE 10 MG: 10 TABLET ORAL at 08:38

## 2021-11-28 RX ADMIN — ENOXAPARIN SODIUM 40 MG: 100 INJECTION SUBCUTANEOUS at 08:38

## 2021-11-28 RX ADMIN — DORZOLAMIDE HYDROCHLORIDE 1 DROP: 20 SOLUTION/ DROPS OPHTHALMIC at 18:12

## 2021-11-28 RX ADMIN — BRIMONIDINE TARTRATE 1 DROP: 2 SOLUTION OPHTHALMIC at 21:33

## 2021-11-28 RX ADMIN — Medication 10 ML: at 05:35

## 2021-11-28 RX ADMIN — LEVOTHYROXINE SODIUM 88 MCG: 0.09 TABLET ORAL at 08:38

## 2021-11-28 RX ADMIN — CEFTRIAXONE 1 G: 1 INJECTION, POWDER, FOR SOLUTION INTRAMUSCULAR; INTRAVENOUS at 04:15

## 2021-11-28 RX ADMIN — DORZOLAMIDE HYDROCHLORIDE 1 DROP: 20 SOLUTION/ DROPS OPHTHALMIC at 09:52

## 2021-11-28 RX ADMIN — TIMOLOL MALEATE 1 DROP: 5 SOLUTION OPHTHALMIC at 18:14

## 2021-11-28 RX ADMIN — Medication 10 ML: at 21:28

## 2021-11-28 RX ADMIN — DONEPEZIL HYDROCHLORIDE 10 MG: 5 TABLET, FILM COATED ORAL at 21:30

## 2021-11-28 RX ADMIN — ASPIRIN 81 MG: 81 TABLET, COATED ORAL at 08:38

## 2021-11-28 RX ADMIN — TAMSULOSIN HYDROCHLORIDE 0.4 MG: 0.4 CAPSULE ORAL at 08:38

## 2021-11-28 RX ADMIN — ISOSORBIDE MONONITRATE 20 MG: 20 TABLET ORAL at 18:11

## 2021-11-28 RX ADMIN — DORZOLAMIDE HYDROCHLORIDE 1 DROP: 20 SOLUTION/ DROPS OPHTHALMIC at 21:32

## 2021-11-28 RX ADMIN — TIMOLOL MALEATE 1 DROP: 5 SOLUTION OPHTHALMIC at 09:52

## 2021-11-28 RX ADMIN — SODIUM CHLORIDE, POTASSIUM CHLORIDE, SODIUM LACTATE AND CALCIUM CHLORIDE 75 ML/HR: 600; 310; 30; 20 INJECTION, SOLUTION INTRAVENOUS at 13:13

## 2021-11-28 RX ADMIN — BRIMONIDINE TARTRATE 1 DROP: 2 SOLUTION OPHTHALMIC at 05:35

## 2021-11-28 RX ADMIN — FINASTERIDE 5 MG: 5 TABLET, FILM COATED ORAL at 08:38

## 2021-11-28 NOTE — PROGRESS NOTES
Hospitalist Progress Note    NAME: Gwendolyn Burgos   :  1929   MRN:  [id number]       Assessment / Plan:    Generalized weakness with hypotension   Due to urinary tract infection  Hypokalemia   Hypocalcemia -corrected calcium 7.5  Hyperchloremic non-anion gap metabolic acidosis   Chronic hypoalbuminemia  Was hypothermic, bradycardic, and hypotensive on admission  Potassium 3.3  CO2 13, AGAP 6  Albumin 1.2  Chest x-ray left lower lobe atelectasis likely with chronic interstitial lung disease pattern noted  UA > 100 WBCs with large leukocyte Estrace and positive nitrates  Urine and blood culture pending  TSH 0.24     Status post 2 L normal saline bolus in ED   Currently on bicarb drip. Acidosis has resolved. Will change IV fluid to Ringer's lactate. Potassium replaced  S/p 1 g calcium in ED. Hypocalcemia resolved. Continue ceftriaxone for UTI  Follow urine culture, blood culture  Inpatient PT OT eval for DC planning     Hypothyroidism  CAD status post stent  TSH 0.24. Check Free T4  Continue Synthroid  Continue isosorbide twice daily as tolerated  Hold home amlodipine,  Lisinopril  Continue home nitroglycerin as needed, statin     Glaucoma  Continue home eyedrops     CLL  Squamous cell carcinoma of the scalp  Continue wound care     Dementia  Continue Aricept  Plan is to get back to memory care unit at [facility] on discharge per son          25.0 - 29.9 Overweight / Body mass index is 28.62 kg/m². Estimated discharge date:  Barriers:      Code Status: Full code per wishes of the patient in ED, confirmed by son  Surrogate Decision Maker: Miguel Angel Hansen #[phone]     DVT Prophylaxis: Subcu heparin  GI Prophylaxis: not indicated     Baseline: Patient is currently living at memory care unit at [facility] due to progressive worsening dementia     Subjective:     Chief Complaint / Reason for Physician Visit  Discussed with RN events overnight.    No acute events overnight  Vital signs have stabilized  No complaints    Review of Systems:  Symptom Y/N Comments  Symptom Y/N Comments   Fever/Chills    Chest Pain     Poor Appetite    Edema     Cough    Abdominal Pain     Sputum    Joint Pain     SOB/THOMSON    Pruritis/Rash     Nausea/vomit    Tolerating PT/OT     Diarrhea    Tolerating Diet     Constipation    Other       Could NOT obtain due to:      Objective:     VITALS:   Last 24hrs VS reviewed since prior progress note. Most recent are:  Patient Vitals for the past 24 hrs:   Temp Pulse Resp BP SpO2   11/28/21 0757 97.4 °F (36.3 °C) (!) 52 16 (!) 113/59 93 %   11/28/21 0244 97.9 °F (36.6 °C) (!) 56 16 (!) 103/56 94 %   11/27/21 2354 98.6 °F (37 °C) 61 17 (!) 106/58 95 %   11/27/21 1956 97.9 °F (36.6 °C) 68 16 (!) 132/58 95 %   11/27/21 1649  71 18 (!) 128/90 99 %   11/27/21 1604  (!) 55 15 (!) 111/59 95 %   11/27/21 1400 97.4 °F (36.3 °C) (!) 47 13 (!) 98/51 95 %   11/27/21 1333     97 %   11/27/21 1325  (!) 53 17 95/74 94 %   11/27/21 1310  (!) 48 18 (!) 90/51 95 %   11/27/21 1255  (!) 45 17 (!) 96/46 97 %   11/27/21 1245 (!) 96.6 °F (35.9 °C) (!) 43 14 (!) 96/46 97 %       Intake/Output Summary (Last 24 hours) at 11/28/2021 0856  Last data filed at 11/28/2021 0805  Gross per 24 hour   Intake 2050 ml   Output 100 ml   Net 1950 ml        I had a face to face encounter and independently examined this patient on 11/28/2021, as outlined below:  PHYSICAL EXAM:  General: WD, WN. Alert, cooperative, no acute distress    EENT:  EOMI. Anicteric sclerae. MMM  Resp:  CTA bilaterally, no wheezing or rales. No accessory muscle use  CV:  Regular  rhythm,  No edema  GI:  Soft, Non distended, Non tender. +Bowel sounds  Neurologic:  Alert and oriented X 3, normal speech,   Psych:   Good insight. Not anxious nor agitated  Skin:  No rashes. No jaundice.   Left parietal scalp indentation and superficial wound    Reviewed most current lab test results and cultures  YES  Reviewed most current radiology test results   YES  Review and summation of old records today    NO  Reviewed patient's current orders and MAR    YES  PMH/SH reviewed - no change compared to H&P  ________________________________________________________________________  Care Plan discussed with:    Comments   Patient x    Family      RN     Care Manager     Consultant                        Multidiciplinary team rounds were held today with , nursing, pharmacist and clinical coordinator. Patient's plan of care was discussed; medications were reviewed and discharge planning was addressed. ________________________________________________________________________  Total NON critical care TIME:  35   Minutes    Total CRITICAL CARE TIME Spent:   Minutes non procedure based      Comments   >50% of visit spent in counseling and coordination of care     ________________________________________________________________________  Travis MD Gama     Procedures: see electronic medical records for all procedures/Xrays and details which were not copied into this note but were reviewed prior to creation of Plan. LABS:  I reviewed today's most current labs and imaging studies.   Pertinent labs include:  Recent Labs     11/28/21 0158 11/27/21  1308   WBC 19.1* 8.8   HGB 12.8 13.7   HCT 37.3 41.8    288     Recent Labs     11/28/21 0158 11/27/21  1457    143   K 4.5 3.3*   * 124*   CO2 24 13*   * 65   BUN 12 11   CREA 0.93 0.44*   CA 8.2* 5.3*   ALB  --  1.2*   TBILI  --  0.3   ALT  --  9*       Signed: Travis Falling, MD

## 2021-11-28 NOTE — PROGRESS NOTES
Problem: Mobility Impaired (Adult and Pediatric)  Goal: *Acute Goals and Plan of Care (Insert Text)  Description: FUNCTIONAL STATUS PRIOR TO ADMISSION: Patient was modified independent using a rollator for functional mobility. HOME SUPPORT PRIOR TO ADMISSION: The patient lived at [facility]. Physical Therapy Goals  Initiated 11/28/2021  1. Patient will move from supine to sit and sit to supine  in bed with supervision/set-up within 7 day(s). 2.  Patient will transfer from bed to chair and chair to bed with supervision/set-up using the least restrictive device within 7 day(s). 3.  Patient will perform sit to stand with supervision/set-up within 7 day(s). 4.  Patient will ambulate with supervision/set-up for 100 feet with the least restrictive device within 7 day(s). Outcome: Not Met    PHYSICAL THERAPY EVALUATION  Patient: Manju Lowry (49 y.o. male)  Date: 11/28/2021  Primary Diagnosis: UTI (urinary tract infection) [N39.0]  Hypotension [I95.9]  Sepsis ([address] [A41.9]        Precautions:   Fall      ASSESSMENT  Based on the objective data described below, the patient presents with generalized weakness, decreased activity tolerance, impaired balance and altered gait. Pt was received in supine on room air and cleared by nursing to mobilize. Pt [place], but followed commands without difficulty. Oriented to person and time. He was able to come to the edge of the bed and stood with walker. Ambulated around the room with kyphotic posture, but no LOB noted. He was left sitting up in the chair at the end of the session. Current Level of Function Impacting Discharge (mobility/balance): CGA    Functional Outcome Measure: The patient scored Total: 40/100 on the Barthel Index outcome measure which is indicative of being 60 in basic self-care.      Other factors to consider for discharge: memory unit at [facility]     Patient will benefit from skilled therapy intervention to address the above noted impairments. PLAN :  Recommendations and Planned Interventions: bed mobility training, transfer training, gait training, therapeutic exercises, patient and family training/education, and therapeutic activities      Frequency/Duration: Patient will be followed by physical therapy:  4 times a week to address goals. Recommendation for discharge: (in order for the patient to meet his/her long term goals)  HHPT at [facility]    This discharge recommendation:  Has not yet been discussed the attending provider and/or case management    IF patient discharges home will need the following DME: to be determined (TBD)         SUBJECTIVE:   Patient stated did I fall? Thor Lias    OBJECTIVE DATA SUMMARY:   HISTORY:    Past Medical History:   Diagnosis Date    Arthritis     SHOULDERS AND SPINE    CAD (coronary artery disease) 7/1998    MI CARDIAC CATH, STENT X 2    Cancer ([facility] [address] 04/2008    chronic lymphocytic leukemia    Cancer ([facility] [address]     multiple skin cancers     Chest pain, unspecified     Chronic pain     L 2 LYTIC LESION    Edema     Hypertension     Ill-defined condition     GLUCOMA    MI (myocardial infarction) ([facility] [address]     Postsurgical percutaneous transluminal coronary angioplasty status 6/4/2012    Pre-operative cardiovascular examination     Thyroid disease     HYPO     Past Surgical History:   Procedure Laterality Date    CARDIAC SURG PROCEDURE UNLIST  7/1998    2 Cardiac stents    EGD  6/7/2010         HX ADENOIDECTOMY  1936    HX APPENDECTOMY  1954    HX CATARACT REMOVAL      bilateral    HX ORTHOPAEDIC  4/15/2008    Right hip replacement    HX OTHER SURGICAL  5/2011    Moh's procedure,     HX OTHER SURGICAL      MULTIPLES SKIN CANCER REMOVALS, MOSTLY ON HEAD    HX SKIN BIOPSY  04/27/2018    on scalp     HX TONSILLECTOMY  1936    1936       Personal factors and/or comorbidities impacting plan of care:     Home Situation  Home Environment: [address] Name: [facility] memory care  One/Two Story Residence: Two story  Living Alone: No  Support Systems: Child(shonna), Sandi  Patient Expects to be Discharged to[de-identified] Assisted living  Current DME Used/Available at Home: Rubie Mcardle    EXAMINATION/PRESENTATION/DECISION MAKING:   Critical Behavior:     Orientation Level: Oriented to person, Oriented to place        Hearing: Auditory  Auditory Impairment: Hard of hearing, bilateral  Skin:  dressing on head  Edema: none  Range Of Motion:  AROM: Generally decreased, functional           PROM: Generally decreased, functional           Strength:    Strength: Generally decreased, functional                    Tone & Sensation:   Tone: Normal                              Coordination:  Coordination: Within functional limits  Vision:      Functional Mobility:  Bed Mobility:  Rolling: Contact guard assistance  Supine to Sit: Minimum assistance     Scooting: Contact guard assistance  Transfers:  Sit to Stand: Contact guard assistance  Stand to Sit: Contact guard assistance                       Balance:   Sitting: Impaired  Sitting - Static: Good (unsupported)  Sitting - Dynamic: Fair (occasional)  Standing: Impaired  Standing - Static: Fair; Constant support  Standing - Dynamic : Fair; Constant support  Ambulation/Gait Training:  Distance (ft): 30 Feet (ft)  Assistive Device: Gait belt; Walker, rolling  Ambulation - Level of Assistance: Contact guard assistance        Gait Abnormalities: Decreased step clearance; Shuffling gait        Base of Support: Widened     Speed/Aleida: Pace decreased (<100 feet/min); Shuffled  Step Length: Left shortened; Right shortened                  Functional Measure:  Barthel Index:    Bathin  Bladder: 5  Bowels: 5  Groomin  Dressin  Feeding: 10  Mobility: 0  Stairs: 0  Toilet Use: 5  Transfer (Bed to Chair and Back): 10  Total: 40/100       The Barthel ADL Index: Guidelines  1.  The index should be used as a record of what a patient does, not as a record of what a patient could do. 2. The main aim is to establish degree of independence from any help, physical or verbal, however minor and for whatever reason. 3. The need for supervision renders the patient not independent. 4. A patient's performance should be established using the best available evidence. Asking the patient, friends/relatives and nurses are the usual sources, but direct observation and common sense are also important. However direct testing is not needed. 5. Usually the patient's performance over the preceding 24-48 hours is important, but occasionally longer periods will be relevant. 6. Middle categories imply that the patient supplies over 50 per cent of the effort. 7. Use of aids to be independent is allowed. Score Interpretation (from [address] [facility] [place] [address])    Independent   60-79 Minimally independent   40-59 Partially dependent   20-39 Very dependent   <20 Totally dependent     -Monica Ovalles., Barthel, DValorieW. (1965). Functional evaluation: the Barthel Index. [address] [facility] ([address] (1997). The Barthel activities of daily living index: self-reporting versus actual performance in the old (> or = 75 years). Journal of [address] [place] [address] 45(7), [address] [facility], .ValorieMValorieF, Kendrick Jaime., Ramy Pedroza. (1999). Measuring the change in disability after inpatient rehabilitation; comparison of the responsiveness of the Barthel Index and Functional [place] Measure. Journal of Neurology, Neurosurgery, and Psychiatry, 66(4), 730-606. Everardo Tyler, N.J.A, AVA MackenzieJ.MAC, & Ashley Morton MValorieA. (2004) Assessment of post-stroke quality of life in cost-effectiveness studies: The usefulness of the Barthel Index and the EuroQoL-5D.  Quality of Life Research, 15, 060-88        Physical Therapy Evaluation Charge Determination   History Examination Presentation Decision-Making   HIGH Complexity :3+ comorbidities / personal factors will impact the outcome/ POC MEDIUM Complexity : 3 Standardized tests and measures addressing body structure, function, activity limitation and / or participation in recreation  MEDIUM Complexity : Evolving with changing characteristics  Other outcome measures barthel  MEDIUM      Based on the above components, the patient evaluation is determined to be of the following complexity level: MEDIUM    Pain Rating:  No complaints    Activity Tolerance:   Fair    After treatment patient left in no apparent distress:   Sitting in chair, Call bell within reach, and Bed / chair alarm activated    COMMUNICATION/EDUCATION:   The patients plan of care was discussed with: Occupational therapist and Registered nurse. Fall prevention education was provided and the patient/caregiver indicated understanding. and Patient/family agree to work toward stated goals and plan of care.     Thank you for this referral.  [place] Distance, PT, DPT   Time Calculation: 18 mins

## 2021-11-28 NOTE — PROGRESS NOTES
Problem: Self Care Deficits Care Plan (Adult)  Goal: *Acute Goals and Plan of Care (Insert Text)  Description:   FUNCTIONAL STATUS PRIOR TO ADMISSION: Patient was modified independent using a rollator for functional mobility. Patient required assist for LB ADLs, bathing, and IADLs but was able to complete grooming/UB dressing with supervision. Patient lived in [address] [place] [address] unit. HOME SUPPORT: The patient lived with wife in [place]. Occupational Therapy Goals  Initiated 11/28/2021  1. Patient will perform grooming standing at sink with supervision/set-up within 7 day(s). 2.  Patient will perform upper body dressing with supervision/set-up within 7 day(s). 3.  Patient will perform toilet transfers using LRAD with supervision/set-up within 7 day(s). 4.  Patient will perform all aspects of toileting with minimal assistance within 7 day(s). Outcome: Not Met   OCCUPATIONAL THERAPY EVALUATION  Patient: Gisele Andino (82 y.o. male)  Date: 11/28/2021  Primary Diagnosis: UTI (urinary tract infection) [N39.0]  Hypotension [I95.9]  Sepsis ([address] [A41.9]        Precautions: Fall    ASSESSMENT  Based on the objective data described below, the patient presents with decreased independence in self-care and functional mobility secondary to general weakness, impaired cognition, decreased safety awareness, impaired balance, baseline [place], and decreased activity tolerance. Patient [facility] in [facility] in memory care section and admitted for UTI. Patient appears to be functioning below his baseline for self-care and functional mobility, now completing self-care with set-up/supervision to total assist and functional mobility with contact guard to min assist using rolling walker. Patient received asleep in bed and opened eyes to tactile input. Patient agreeable for therapy and required contact guard to min assist to come EOB.  While seated, patient required occasional support to maintain sitting balance and required total assist for LB dressing. Patient washed his face with set-up/stand-by assist while seated and tolerated well. Patient completed sit > stand with contact guard assist and ambulated around room using rolling walker with PT present. Patient noted to have forward flexed kyphotic posture while walking. Patient agreed to sit in recliner chair and completed transfer with contact guard assist. Patient declined ambulating into bathroom this session secondary to increased fatigue. Patient was left sitting in recliner with all needs met and chair alarmed. Patient would benefit from skilled OT services during acute hospital stay. Anticipate patient can return to [facility] with [facility] therapy services, pending progress. Current Level of Function Impacting Discharge (ADLs/self-care): set-up/supervision to total assist for self-care, contact guard to min assist for functional mobility using rolling walker     Functional Outcome Measure: The patient scored 40/100 on the Barthel Index outcome measure which is indicative of 60% functional impairment in ADLs, partially dependent. Other factors to consider for discharge: fall risk, baseline dementia     Patient will benefit from skilled therapy intervention to address the above noted impairments. PLAN :  Recommendations and Planned Interventions: self care training, functional mobility training, therapeutic exercise, balance training, therapeutic activities, endurance activities, patient education, home safety training and family training/education    Frequency/Duration: Patient will be followed by occupational therapy 3 times a week to address goals.     Recommendation for discharge: (in order for the patient to meet his/her long term goals)  Return to [facility] with [facility] therapy services     This discharge recommendation:  Has not yet been discussed the attending provider and/or case management    IF patient discharges home will need the following DME: TBD pending progress SUBJECTIVE:   Patient stated Reid [place] been  forever.     OBJECTIVE DATA SUMMARY:   HISTORY:   Past Medical History:   Diagnosis Date    Arthritis     SHOULDERS AND SPINE    CAD (coronary artery disease) 7/1998    MI CARDIAC CATH, STENT X 2    Cancer ([address] 04/2008    chronic lymphocytic leukemia    Cancer ([facility] [address]     multiple skin cancers     Chest pain, unspecified     Chronic pain     L 2 LYTIC LESION    Edema     Hypertension     Ill-defined condition     GLUCOMA    MI (myocardial infarction) ([facility] [address]     Postsurgical percutaneous transluminal coronary angioplasty status 6/4/2012    Pre-operative cardiovascular examination     Thyroid disease     HYPO     Past Surgical History:   Procedure Laterality Date    CARDIAC SURG PROCEDURE UNLIST  7/1998    2 Cardiac stents    EGD  6/7/2010         HX ADENOIDECTOMY  1936    HX APPENDECTOMY  1954    HX CATARACT REMOVAL      bilateral    HX ORTHOPAEDIC  4/15/2008    Right hip replacement    HX OTHER SURGICAL  5/2011    Moh's procedure,     HX OTHER SURGICAL      MULTIPLES SKIN CANCER REMOVALS, MOSTLY ON HEAD    HX SKIN BIOPSY  04/27/2018    on scalp     HX TONSILLECTOMY  1936    1936       Expanded or extensive additional review of patient history:     Home Situation  Home Environment: Assisted living  [address] [place] [address] [place] Name: [facility]  One/Two Story Residence: One story  Living Alone: No  Support Systems: Child(shonna), Sandi  Patient Expects to be Discharged to[de-identified] Assisted living  Current DME Used/Available at Home: rene Levy    Hand dominance: Right    EXAMINATION OF PERFORMANCE DEFICITS:  Cognitive/Behavioral Status:  Neurologic State: Alert  Orientation Level: Oriented to person; Oriented to time; Disoriented to place; Disoriented to situation  Cognition: Follows commands; Memory loss  Perception: Appears intact  Perseveration: No perseveration noted  Safety/Judgement: Decreased insight into deficits;  Fall prevention    Hearing: Auditory  Auditory Impairment: Hard of hearing, bilateral    Vision/Perceptual:    Acuity: Impaired near vision; Impaired far vision      Range of Motion:  AROM: Generally decreased, functional  PROM: Generally decreased, functional    Strength:  Strength: Generally decreased, functional    Coordination:  Coordination: Within functional limits  Fine Motor Skills-Upper: Left Intact; Right Intact    Gross Motor Skills-Upper: Left Intact; Right Intact    Tone & Sensation:  Tone: Normal    Balance:  Sitting: Impaired  Sitting - Static: Good (unsupported)  Sitting - Dynamic: Fair (occasional)  Standing: Impaired  Standing - Static: Fair; Constant support  Standing - Dynamic : Fair; Constant support    Functional Mobility and Transfers for ADLs:  Bed Mobility:  Rolling: Contact guard assistance  Supine to Sit: Minimum assistance  Scooting: Contact guard assistance    Transfers:  Sit to Stand: Contact guard assistance  Stand to Sit: Contact guard assistance  Bed to Chair: Contact guard assistance    ADL Assessment:  Feeding: Setup; Supervision  Oral Facial Hygiene/Grooming: Setup; Stand-by assistance (seated)  Bathing: Maximum assistance  Upper Body Dressing: Minimum assistance  Lower Body Dressing: Total assistance  Toileting: Maximum assistance    ADL Intervention and task modifications:    Grooming  Position Performed: Seated edge of bed  Washing Face: Set-up; Stand-by assistance  Cues: Verbal cues provided    Lower Body Dressing Assistance  Socks: Total assistance (dependent)  Leg Crossed Method Used: No  Position Performed: Seated edge of bed  Cues: Tess Feather; Physical assistance; Tactile cues provided; Verbal cues provided    Cognitive Retraining  Safety/Judgement: Decreased insight into deficits;  Fall prevention     Functional Measure:    Barthel Index:  Bathin  Bladder: 5  Bowels: 5  Groomin  Dressin  Feeding: 10  Mobility: 0  Stairs: 0  Toilet Use: 5  Transfer (Bed to Chair and Back): 10  Total: 40/100      The Barthel ADL Index: Guidelines  1. The index should be used as a record of what a patient does, not as a record of what a patient could do. 2. The main aim is to establish degree of independence from any help, physical or verbal, however minor and for whatever reason. 3. The need for supervision renders the patient not independent. 4. A patient's performance should be established using the best available evidence. Asking the patient, friends/relatives and nurses are the usual sources, but direct observation and common sense are also important. However direct testing is not needed. 5. Usually the patient's performance over the preceding 24-48 hours is important, but occasionally longer periods will be relevant. 6. Middle categories imply that the patient supplies over 50 per cent of the effort. 7. Use of aids to be independent is allowed. Score Interpretation (from [address] [facility] 83)    Independent   60-79 Minimally independent   40-59 Partially dependent   20-39 Very dependent   <20 Totally dependent     -Monica Ovalles., Barthel, D.W. (1965). Functional evaluation: the Barthel Index. [address] [facility] ([address] (1997). The Barthel activities of daily living index: self-reporting versus actual performance in the old (> or = 75 years). Journal [address] 45(7), 14 [facility], ..., Wesly Chacon., [facility]. (1999). Measuring the change in disability after inpatient rehabilitation; comparison of the responsiveness of the Barthel Index and Functional [place] Measure. Journal of Neurology, Neurosurgery, and Psychiatry, 66(4), 286-705. Cathie Alaniz, N.J.A, ANGELICA Mackenzie, & Tyler Marks M.A. (2004) Assessment of post-stroke quality of life in cost-effectiveness studies: The usefulness of the Barthel Index and the EuroQoL-5D.  Quality of Life Research, 13, 031-16     Based on the above components, the patient evaluation is determined to be of the following complexity level: MEDIUM  Pain Rating:  Patient did not c/o pain during session. Activity Tolerance:   Fair and requires rest breaks    After treatment patient left in no apparent distress:    Sitting in chair, Call bell within reach, and Bed / chair alarm activated    COMMUNICATION/EDUCATION:   The patients plan of care was discussed with: Physical therapist and Registered nurse. Home safety education was provided and the patient/caregiver indicated understanding., Patient/family have participated as able in goal setting and plan of care. , and Patient/family agree to work toward stated goals and plan of care. This patients plan of care is appropriate for delegation to [facility].     Thank you for this referral.  Catalina Lester OTR/L  Time Calculation: 17 mins

## 2021-11-28 NOTE — PROGRESS NOTES
End of Shift Note    Bedside shift change report given to Timothy Angeles RN (oncoming nurse) by Pasquale Cruz RN (offgoing nurse). Report included the following information SBAR, Kardex, Intake/Output and MAR    Shift worked: days   Shift summary and any significant changes:    Ist degree block on tele, alex in the 46s  Dressing on top of head dry intact, but I was not able to find wound care orders  Pt fed self and is cooperative  Family updated with possible dc tomorrow back to [facility], pending medical clearance   Concerns for physician to address: Wound care consult for wound on top of head?    Zone phone for oncoming shift:   [id number]     Patient Information  Michael Milian  80 y.o.  11/27/2021 12:38 PM by Junito Cui MD. Michael Milian was admitted from Adult/Group Home    Problem List  Patient Active Problem List    Diagnosis Date Noted    UTI (urinary tract infection) 11/27/2021    Hypotension 11/27/2021    Sepsis ([address] 11/27/2021    Hypocalcemia 11/27/2021    Hypokalemia 11/27/2021    Hyperchloremic metabolic acidosis 28/82/2727    Dementia ([address] 11/27/2021    Hypothyroidism 11/27/2021    SCC (squamous cell carcinoma), scalp/neck 06/18/2018    Squamous cell cancer of scalp and skin of neck 06/11/2018    Pneumonia, organism unspecified(486) 03/27/2013    Temporal arteritis ([address] 03/27/2013    Postsurgical percutaneous transluminal coronary angioplasty status 06/04/2012    Mixed hyperlipidemia 06/04/2012    Essential hypertension, benign 06/04/2012    Coronary atherosclerosis of native coronary artery 06/04/2012     Past Medical History:   Diagnosis Date    Arthritis     SHOULDERS AND SPINE    CAD (coronary artery disease) 7/1998    MI CARDIAC CATH, STENT X 2    Cancer ([address] 04/2008    chronic lymphocytic leukemia    Cancer ([address]     multiple skin cancers     Chest pain, unspecified     Chronic pain     L 2 LYTIC LESION    Edema     Hypertension     Ill-defined condition     GLUCOMA    [facility]     Postsurgical percutaneous transluminal coronary angioplasty status 6/4/2012    Pre-operative cardiovascular examination     Thyroid disease     HYPO         Activity:  Activity Level: Up with Assistance  Number times ambulated in hallways past shift: 0  Number of times OOB to chair past shift: 0    Cardiac:   Cardiac Monitoring: Yes      Cardiac Rhythm: 1\" AV Block    Access:   Current line(s): PIV     Genitourinary:   Urinary status: voiding and incontinent    Respiratory:   O2 Device: None (Room air)  Chronic home O2 use?: NO  Incentive spirometer at bedside: NO       GI:  Last Bowel Movement Date: 11/27/21  Current diet:  ADULT DIET Regular; Low Fat/Low Chol/High Fiber/2 gm Na  Passing flatus: YES  Tolerating current diet: YES       Pain Management:   Patient states pain is manageable on current regimen: YES    Skin:  Amilcar Score: 18  Interventions: PT/OT consult    Patient Safety:  Fall Score:  Total Score: 4  Interventions: bed/chair alarm, gripper socks and pt to call before getting OOB  High Fall Risk: Yes  @Rollbelt  @dexterity to release roll belt  Yes/No ( must document dexterity  here by stating Yes or No here, otherwise this is a restraint and must follow restraint documentation policy.)    DVT prophylaxis:  DVT prophylaxis Med- No  DVT prophylaxis SCD or DANIEL- No     Wounds: (If Applicable)  Wounds- Yes  Location top of head    Active Consults:  None    Length of Stay:  Expected LOS: - - -  Actual LOS: 1  Discharge Plan: No       Clari Philippe RN

## 2021-11-28 NOTE — PROGRESS NOTES
Transition of Care Plan:    RUR: 11% - low  Disposition: Return to [facility]   Follow up appointments: [facility] to [facility]  DME needed: [facility] to [facility]  Transportation at Discharge: Son VS BLS depending on medical appropriateness   Keys or means to access home: Facility to provide      IM Medicare Letter: 2nd IM to be provided at [place]  Is patient a BCPI-A Bundle: No   If yes, was Bundle Letter given?: n/a  Caregiver Contact: Miguel Angel Hernandez - [phone]  Discharge Caregiver contacted prior to discharge? To be contacted. Reason for Admission:  UT                   RUR Score:  11% - low                 Plan for utilizing home health: None at this time    PCP: First and Last name:  Sallie Price MD     Name of Practice:    Are you a current patient: Yes/No: Yes   Approximate date of last visit: Early 2021   Can you participate in a virtual visit with your PCP:                     Current Advanced Directive/Advance Care Plan: Full Code      Healthcare Decision Maker:   Click here to complete [address] including selection of the Healthcare Decision Maker Relationship (ie \"Primary\")                       Transition of Care Plan:  Return to [facility]     Chart reviewed. CM phoned miguel angel to complete assessment and discuss discharge plan. Pt admitted with UTI. Verified contact information, demographics and insurance information. Prior to admission, pt [address] in [address] [facility] unit. Plan is for pt to return to facility once medically stable. CM sent HANK referral via [employer]. Family is agreeable to the discharge plan and voiced no further questions/concerns regarding discharge at this time. Unit CM will continue to follow for d/c planning. Care Management Interventions  PCP Verified by CM: Yes  Palliative Care Criteria Met (RRAT>21 & CHF Dx)?: No  Mode of Transport at Discharge:  Other (see comment) (Son vs BLS)  Transition of Care Consult (CM Consult): Assisted Living  Discharge Durable Medical Equipment: No  Physical Therapy Consult: No  Occupational Therapy Consult: No  Speech Therapy Consult: No  Support Systems: Child(shonna), Sandi  Confirm Follow Up Transport: Other (see comment) ([facility] to [facility])  The Patient and/or Patient Representative was Provided with a Choice of Provider and Agrees with the Discharge Plan?: Yes  Freedom of Choice List was Provided with Basic Dialogue that Supports the Patient's Individualized Plan of Care/Goals, Treatment Preferences and Shares the Quality Data Associated with the Providers?: Yes  [place] Resource Information Provided?: No  Discharge Location  Discharge Placement: Assisted Living    YENNY Stinson  Care Manager, [facility]  [phone]

## 2021-11-28 NOTE — PROGRESS NOTES
End of Shift Note    Bedside shift change report given to Raciel Reyes (oncoming nurse) by Sam Israel RN (offgoing nurse). Report included the following information SBAR, Kardex, Intake/Output and MAR    Shift worked:  night   Shift summary and any significant changes:     Uneventful night. Dual skin done, pt with skin CA to top of head drst CDI, yeasty panus, pink urine,  Tele first degree block. CHG full bath given, wound care done,IVF, IV abx, call bell and phone within reach of patient.  Safety measures maintained      Concerns for physician to address: none   Zone phone for oncoming shift:   [phone]     Patient Information  Hal Collins  80 y.o.  11/27/2021 12:38 PM by Servando Jimenez MD. Hal Collins was admitted from Adult/Group Home    Problem List  Patient Active Problem List    Diagnosis Date Noted    UTI (urinary tract infection) 11/27/2021    Hypotension 11/27/2021    Sepsis ([address] 11/27/2021    Hypocalcemia 11/27/2021    Hypokalemia 11/27/2021    Hyperchloremic metabolic acidosis 73/27/8489    Dementia ([address] 11/27/2021    Hypothyroidism 11/27/2021    SCC (squamous cell carcinoma), scalp/neck 06/18/2018    Squamous cell cancer of scalp and skin of neck 06/11/2018    Pneumonia, organism unspecified(486) 03/27/2013    Temporal arteritis ([address] 03/27/2013    Postsurgical percutaneous transluminal coronary angioplasty status 06/04/2012    Mixed hyperlipidemia 06/04/2012    Essential hypertension, benign 06/04/2012    Coronary atherosclerosis of native coronary artery 06/04/2012     Past Medical History:   Diagnosis Date    Arthritis     SHOULDERS AND SPINE    CAD (coronary artery disease) 7/1998    MI CARDIAC CATH, STENT X 2    Cancer ([address] 04/2008    chronic lymphocytic leukemia    Cancer ([address]     multiple skin cancers     Chest pain, unspecified     Chronic pain     L 2 LYTIC LESION    Edema     Hypertension     Ill-defined condition     GLUCOMA    [facility] infarction) [facility]     Postsurgical percutaneous transluminal coronary angioplasty status 6/4/2012    Pre-operative cardiovascular examination     Thyroid disease     HYPO         Activity:  Activity Level: Up with Assistance  Number times ambulated in hallways past shift: 0  Number of times OOB to chair past shift: 0    Cardiac:   Cardiac Monitoring: Yes      Cardiac Rhythm: 1\" AV Block    Access:   Current line(s): PIV     Genitourinary:   Urinary status: voiding and incontinent    Respiratory:   O2 Device: None (Room air)  Chronic home O2 use?: NO  Incentive spirometer at bedside: NO       GI:  Last Bowel Movement Date: 11/27/21  Current diet:  ADULT DIET Regular; Low Fat/Low Chol/High Fiber/2 gm Na  Passing flatus: YES  Tolerating current diet: YES       Pain Management:   Patient states pain is manageable on current regimen: YES    Skin:  Amilcar Score: 18  Interventions: PT/OT consult    Patient Safety:  Fall Score:  Total Score: 4  Interventions: bed/chair alarm, gripper socks and pt to call before getting OOB  High Fall Risk: Yes  @Rollbelt  @dexterity to release roll belt  Yes/No ( must document dexterity  here by stating Yes or No here, otherwise this is a restraint and must follow restraint documentation policy.)    DVT prophylaxis:  DVT prophylaxis Med- No  DVT prophylaxis SCD or DANIEL- No     Wounds: (If Applicable)  Wounds- Yes  Location top of head    Active Consults:  None    Length of Stay:  Expected LOS: - - -  Actual LOS: 1  Discharge Plan: No       Merritt Heaton RN

## 2021-11-29 LAB
ANION GAP SERPL CALC-SCNC: 5 MMOL/L (ref 5–15)
BACTERIA SPEC CULT: ABNORMAL
BASOPHILS # BLD: 0.1 K/UL (ref 0–0.1)
BASOPHILS NFR BLD: 1 % (ref 0–1)
BUN SERPL-MCNC: 14 MG/DL (ref 6–20)
BUN/CREAT SERPL: 15 (ref 12–20)
CALCIUM SERPL-MCNC: 8.1 MG/DL (ref 8.5–10.1)
CALCULATED R AXIS, ECG10: -14 DEGREES
CALCULATED T AXIS, ECG11: -19 DEGREES
CC UR VC: ABNORMAL
CHLORIDE SERPL-SCNC: 108 MMOL/L (ref 97–108)
CO2 SERPL-SCNC: 27 MMOL/L (ref 21–32)
COVID-19 RAPID TEST, COVR: NOT DETECTED
CREAT SERPL-MCNC: 0.92 MG/DL (ref 0.7–1.3)
DIAGNOSIS, 93000: NORMAL
DIFFERENTIAL METHOD BLD: ABNORMAL
EOSINOPHIL # BLD: 0.2 K/UL (ref 0–0.4)
EOSINOPHIL NFR BLD: 2 % (ref 0–7)
ERYTHROCYTE [DISTWIDTH] IN BLOOD BY AUTOMATED COUNT: 15.4 % (ref 11.5–14.5)
GLUCOSE SERPL-MCNC: 88 MG/DL (ref 65–100)
HCT VFR BLD AUTO: 37.7 % (ref 36.6–50.3)
HGB BLD-MCNC: 12.6 G/DL (ref 12.1–17)
IMM GRANULOCYTES # BLD AUTO: 0 K/UL (ref 0–0.04)
IMM GRANULOCYTES NFR BLD AUTO: 0 % (ref 0–0.5)
LYMPHOCYTES # BLD: 2.1 K/UL (ref 0.8–3.5)
LYMPHOCYTES NFR BLD: 20 % (ref 12–49)
MCH RBC QN AUTO: 28.6 PG (ref 26–34)
MCHC RBC AUTO-ENTMCNC: 33.4 G/DL (ref 30–36.5)
MCV RBC AUTO: 85.7 FL (ref 80–99)
MONOCYTES # BLD: 1.1 K/UL (ref 0–1)
MONOCYTES NFR BLD: 10 % (ref 5–13)
NEUTS SEG # BLD: 6.7 K/UL (ref 1.8–8)
NEUTS SEG NFR BLD: 67 % (ref 32–75)
NRBC # BLD: 0 K/UL (ref 0–0.01)
NRBC BLD-RTO: 0 PER 100 WBC
PLATELET # BLD AUTO: 225 K/UL (ref 150–400)
PMV BLD AUTO: 10 FL (ref 8.9–12.9)
POTASSIUM SERPL-SCNC: 4 MMOL/L (ref 3.5–5.1)
Q-T INTERVAL, ECG07: 464 MS
QRS DURATION, ECG06: 84 MS
QTC CALCULATION (BEZET), ECG08: 493 MS
RBC # BLD AUTO: 4.4 M/UL (ref 4.1–5.7)
SERVICE CMNT-IMP: ABNORMAL
SODIUM SERPL-SCNC: 140 MMOL/L (ref 136–145)
SOURCE, COVRS: NORMAL
VENTRICULAR RATE, ECG03: 68 BPM
WBC # BLD AUTO: 10.2 K/UL (ref 4.1–11.1)

## 2021-11-29 PROCEDURE — 97530 THERAPEUTIC ACTIVITIES: CPT

## 2021-11-29 PROCEDURE — 93005 ELECTROCARDIOGRAM TRACING: CPT

## 2021-11-29 PROCEDURE — 74011250636 HC RX REV CODE- 250/636: Performed by: INTERNAL MEDICINE

## 2021-11-29 PROCEDURE — 65660000000 HC RM CCU STEPDOWN

## 2021-11-29 PROCEDURE — 74011000258 HC RX REV CODE- 258: Performed by: INTERNAL MEDICINE

## 2021-11-29 PROCEDURE — 87635 SARS-COV-2 COVID-19 AMP PRB: CPT

## 2021-11-29 PROCEDURE — 85025 COMPLETE CBC W/AUTO DIFF WBC: CPT

## 2021-11-29 PROCEDURE — 36415 COLL VENOUS BLD VENIPUNCTURE: CPT

## 2021-11-29 PROCEDURE — 74011250637 HC RX REV CODE- 250/637: Performed by: INTERNAL MEDICINE

## 2021-11-29 PROCEDURE — 80048 BASIC METABOLIC PNL TOTAL CA: CPT

## 2021-11-29 PROCEDURE — 97116 GAIT TRAINING THERAPY: CPT

## 2021-11-29 RX ORDER — LEVOTHYROXINE SODIUM 75 UG/1
75 TABLET ORAL
Status: DISCONTINUED | OUTPATIENT
Start: 2021-11-30 | End: 2021-11-30 | Stop reason: HOSPADM

## 2021-11-29 RX ADMIN — TIMOLOL MALEATE 1 DROP: 5 SOLUTION OPHTHALMIC at 14:52

## 2021-11-29 RX ADMIN — ENOXAPARIN SODIUM 40 MG: 100 INJECTION SUBCUTANEOUS at 14:54

## 2021-11-29 RX ADMIN — EZETIMIBE 10 MG: 10 TABLET ORAL at 14:38

## 2021-11-29 RX ADMIN — BRIMONIDINE TARTRATE 1 DROP: 2 SOLUTION OPHTHALMIC at 21:40

## 2021-11-29 RX ADMIN — BRIMONIDINE TARTRATE 1 DROP: 2 SOLUTION OPHTHALMIC at 05:45

## 2021-11-29 RX ADMIN — Medication 10 ML: at 05:43

## 2021-11-29 RX ADMIN — BRIMONIDINE TARTRATE 1 DROP: 2 SOLUTION OPHTHALMIC at 14:54

## 2021-11-29 RX ADMIN — ISOSORBIDE MONONITRATE 20 MG: 20 TABLET ORAL at 15:00

## 2021-11-29 RX ADMIN — FINASTERIDE 5 MG: 5 TABLET, FILM COATED ORAL at 14:38

## 2021-11-29 RX ADMIN — Medication 10 ML: at 21:40

## 2021-11-29 RX ADMIN — ASPIRIN 81 MG: 81 TABLET, COATED ORAL at 14:38

## 2021-11-29 RX ADMIN — Medication 10 ML: at 14:00

## 2021-11-29 RX ADMIN — CEFTRIAXONE 1 G: 1 INJECTION, POWDER, FOR SOLUTION INTRAMUSCULAR; INTRAVENOUS at 04:04

## 2021-11-29 RX ADMIN — LATANOPROST 1 DROP: 50 SOLUTION OPHTHALMIC at 18:07

## 2021-11-29 RX ADMIN — DORZOLAMIDE HYDROCHLORIDE 1 DROP: 20 SOLUTION/ DROPS OPHTHALMIC at 14:55

## 2021-11-29 RX ADMIN — DORZOLAMIDE HYDROCHLORIDE 1 DROP: 20 SOLUTION/ DROPS OPHTHALMIC at 21:41

## 2021-11-29 RX ADMIN — TAMSULOSIN HYDROCHLORIDE 0.4 MG: 0.4 CAPSULE ORAL at 14:39

## 2021-11-29 NOTE — PROGRESS NOTES
End of Shift Note    Bedside shift change report given to JOSSIE Andrea (oncoming nurse) by Ilda Saint, RN (offgoing nurse). Report included the following information SBAR, Kardex, Intake/Output and MAR    Shift worked: night   Shift summary and any significant changes:    Ist degree block on tele, alex in the 50s  Dressing on top of head dry intact, but I was not able to find wound care orders  Pt fed self and is cooperative   possible dc back to [facility], pending medical clearance. Able to make needs known, call bell and phone within reach of patient. Concerns for physician to address: Wound care consult for wound on top of head?    Zone phone for oncoming shift:        Patient Information  Guevara Ped  80 y.o.  11/27/2021 12:38 PM by Devora Mendenhall MD. Guevara Ped was admitted from Adult/Group Home    Problem List  Patient Active Problem List    Diagnosis Date Noted    UTI (urinary tract infection) 11/27/2021    Hypotension 11/27/2021    Sepsis ([address] 11/27/2021    Hypocalcemia 11/27/2021    Hypokalemia 11/27/2021    Hyperchloremic metabolic acidosis 68/84/3739    Dementia ([address] 11/27/2021    Hypothyroidism 11/27/2021    SCC (squamous cell carcinoma), scalp/neck 06/18/2018    Squamous cell cancer of scalp and skin of neck 06/11/2018    Pneumonia, organism unspecified(486) 03/27/2013    Temporal arteritis ([address] 03/27/2013    Postsurgical percutaneous transluminal coronary angioplasty status 06/04/2012    Mixed hyperlipidemia 06/04/2012    Essential hypertension, benign 06/04/2012    Coronary atherosclerosis of native coronary artery 06/04/2012     Past Medical History:   Diagnosis Date    Arthritis     SHOULDERS AND SPINE    CAD (coronary artery disease) 7/1998    MI CARDIAC CATH, STENT X 2    Cancer ([address] 04/2008    chronic lymphocytic leukemia    Cancer ([address]     multiple skin cancers     Chest pain, unspecified     Chronic pain     L 2 LYTIC LESION    Edema  Hypertension     Ill-defined condition     GLUCOMA    [facility]) [facility]     Postsurgical percutaneous transluminal coronary angioplasty status 6/4/2012    Pre-operative cardiovascular examination     Thyroid disease     HYPO         Activity:  Activity Level: Up with Assistance  Number times ambulated in hallways past shift: 0  Number of times OOB to chair past shift: 0    Cardiac:   Cardiac Monitoring: Yes      Cardiac Rhythm: 1\" AV Block    Access:   Current line(s): PIV     Genitourinary:   Urinary status: voiding and incontinent    Respiratory:   O2 Device: None (Room air)  Chronic home O2 use?: NO  Incentive spirometer at bedside: NO       GI:  Last Bowel Movement Date: 11/27/21  Current diet:  ADULT DIET Regular; Low Fat/Low Chol/High Fiber/2 gm Na  Passing flatus: YES  Tolerating current diet: YES       Pain Management:   Patient states pain is manageable on current regimen: YES    Skin:  Amilcar Score: 18  Interventions: PT/OT consult    Patient Safety:  Fall Score:  Total Score: 5  Interventions: bed/chair alarm, gripper socks and pt to call before getting OOB  High Fall Risk: Yes  @Rollbelt  @dexterity to release roll belt  Yes/No ( must document dexterity  here by stating Yes or No here, otherwise this is a restraint and must follow restraint documentation policy.)    DVT prophylaxis:  DVT prophylaxis Med- No  DVT prophylaxis SCD or DANIEL- No     Wounds: (If Applicable)  Wounds- Yes  Location top of head    Active Consults:  None    Length of Stay:  Expected LOS: - - -  Actual LOS: 2  Discharge Plan: yes       John Gtz RN

## 2021-11-29 NOTE — PROGRESS NOTES
Problem: Mobility Impaired (Adult and Pediatric)  Goal: *Acute Goals and Plan of Care (Insert Text)  Description: FUNCTIONAL STATUS PRIOR TO ADMISSION: Patient was modified independent using a rollator for functional mobility. HOME SUPPORT PRIOR TO ADMISSION: The patient lived at [facility]. Physical Therapy Goals  Initiated 11/28/2021  1. Patient will move from supine to sit and sit to supine  in bed with supervision/set-up within 7 day(s). 2.  Patient will transfer from bed to chair and chair to bed with supervision/set-up using the least restrictive device within 7 day(s). 3.  Patient will perform sit to stand with supervision/set-up within 7 day(s). 4.  Patient will ambulate with supervision/set-up for 100 feet with the least restrictive device within 7 day(s). Outcome: Progressing Towards Goal  PHYSICAL THERAPY TREATMENT  Patient: Gwendolyn Burgos (44 y.o. male)  Date: 11/29/2021  Diagnosis: UTI (urinary tract infection) [N39.0]  Hypotension [I95.9]  Sepsis ([address] [A41.9]   <principal problem not specified>       Precautions: Fall  Chart, physical therapy assessment, plan of care and goals were reviewed. ASSESSMENT  Patient continues with skilled PT services and is progressing towards goals. Overall assistance level is unchanged. He was able to mobilize in the room from bed to toilet and then needed too return to bed per RN request due to confusion and concern for fall risk. Current Level of Function Impacting Discharge (mobility/balance): cg to min a     Other factors to consider for discharge: dementia; lives in memory care unit at [facility]; high fall risk         PLAN :  Patient continues to benefit from skilled intervention to address the above impairments. Continue treatment per established plan of care. to address goals.     Recommendation for discharge: (in order for the patient to meet his/her long term goals)  To be determined: return to memory care unit    This discharge recommendation:  Has been made in collaboration with the attending provider and/or case management    IF patient discharges home will need the following DME: rolling walker if he lacks one       SUBJECTIVE:   Patient stated  I need to use the toilet.     OBJECTIVE DATA SUMMARY:   Critical Behavior:  Neurologic State: Alert, Confused  Orientation Level: Oriented to person  Cognition: Decreased command following, Decreased attention/concentration  Safety/Judgement: Decreased insight into deficits, Decreased awareness of environment, Fall prevention  Functional Mobility Training:  Bed Mobility:  Rolling: Minimum assistance  Supine to Sit: Minimum assistance  Sit to Supine: Minimum assistance; Assist x2  Scooting: Contact guard assistance        Transfers:  Sit to Stand: Contact guard assistance  Stand to Sit: Contact guard assistance                             Balance:  Sitting: Impaired  Sitting - Static: Good (unsupported)  Sitting - Dynamic: Fair (occasional)  Standing: Impaired  Standing - Static: Good; Constant support  Standing - Dynamic : Fair; Constant support  Ambulation/Gait Training:  Distance (ft): 35 Feet (ft) (20 + 15)  Assistive Device: Gait belt; Walker, rolling  Ambulation - Level of Assistance: Contact guard assistance        Gait Abnormalities: Decreased step clearance; Shuffling gait        Base of Support: Widened     Speed/Aleida: Pace decreased (<100 feet/min)  Step Length: Right shortened; Left shortened                  Activity Tolerance:   Good and SpO2 stable on RA    After treatment patient left in no apparent distress:   Supine in bed, Call bell within reach, Bed / chair alarm activated, and Side rails x 3    COMMUNICATION/COLLABORATION:   The patients plan of care was discussed with: Registered nurse and Rehabilitation technician.      Perri Morton PT   Time Calculation: 24 mins

## 2021-11-29 NOTE — PROGRESS NOTES
End of Shift Note    Bedside shift change report given to Ranjit Eddy (oncoming nurse) by Yessi Castañeda RN (offgoing nurse). Report included the following information SBAR, Kardex, Intake/Output and MAR    Shift worked: [id number]   Shift summary and any significant changes:  COVID TEST COMPLETED. ..  POSSIBLE D/C 11/30/21   Concerns for physician to address: NONE   Zone phone for oncoming shift:   [phone]     Patient Information  Aleida Anne  80 y.o.  11/27/2021 12:38 PM by Brice Farrell MD. Aleida Anne was admitted from Adult/Group Home    Problem List  Patient Active Problem List    Diagnosis Date Noted    UTI (urinary tract infection) 11/27/2021    Hypotension 11/27/2021    Sepsis ([address] 11/27/2021    Hypocalcemia 11/27/2021    Hypokalemia 11/27/2021    Hyperchloremic metabolic acidosis 95/99/3147    Dementia ([address] 11/27/2021    Hypothyroidism 11/27/2021    SCC (squamous cell carcinoma), scalp/neck 06/18/2018    Squamous cell cancer of scalp and skin of neck 06/11/2018    Pneumonia, organism unspecified(486) 03/27/2013    Temporal arteritis ([address] 03/27/2013    Postsurgical percutaneous transluminal coronary angioplasty status 06/04/2012    Mixed hyperlipidemia 06/04/2012    Essential hypertension, benign 06/04/2012    Coronary atherosclerosis of native coronary artery 06/04/2012     Past Medical History:   Diagnosis Date    Arthritis     SHOULDERS AND SPINE    CAD (coronary artery disease) 7/1998    MI CARDIAC CATH, STENT X 2    Cancer ([address] 04/2008    chronic lymphocytic leukemia    Cancer ([address]     multiple skin cancers     Chest pain, unspecified     Chronic pain     L 2 LYTIC LESION    Edema     Hypertension     Ill-defined condition     GLUCOMA    MI (myocardial infarction) ([address]     Postsurgical percutaneous transluminal coronary angioplasty status 6/4/2012    Pre-operative cardiovascular examination     Thyroid disease     HYPO         Activity:  Activity Level: Up with Assistance  Number times ambulated in hallways past shift: 0  Number of times OOB to chair past shift: 1    Cardiac:   Cardiac Monitoring: Yes      Cardiac Rhythm: 1\" AV Block    Access:   Current line(s): PIV     Genitourinary:   Urinary status: voiding and incontinent    Respiratory:   O2 Device: None (Room air)  Chronic home O2 use?: NO  Incentive spirometer at bedside: NO       GI:  Last Bowel Movement Date: 11/27/21  Current diet:  ADULT DIET Regular; Low Fat/Low Chol/High Fiber/2 gm Na  Passing flatus: YES  Tolerating current diet: YES       Pain Management:   Patient states pain is manageable on current regimen: YES    Skin:  Amilcar Score: 18  Interventions: PT/OT consult    Patient Safety:  Fall Score:  Total Score: 5  Interventions: bed/chair alarm, gripper socks and pt to call before getting OOB  High Fall Risk: Yes    DVT prophylaxis:  DVT prophylaxis Med- No  DVT prophylaxis SCD or DANIEL- No     Wounds: (If Applicable)  Wounds- Yes  Location top of head    Active Consults:  None    Length of Stay:  Expected LOS: - - -  Actual LOS: 2  Discharge Plan: No       Henrry Miller, RN

## 2021-11-29 NOTE — PROGRESS NOTES
Hospitalist Progress Note    NAME: Pennie Gregorio   :  1929   MRN:  [id number]       Assessment / Plan:    Generalized weakness with hypotension   Due to urinary tract infection GNR UTI/  Hypokalemia -replaced  Hypocalcemia -corrected calcium 7.5  Hyperchloremic non-anion gap metabolic acidosis -resolved  Chronic hypoalbuminemia  Was hypothermic, bradycardic, and hypotensive on admission  Potassium 3.3  CO2 13, AGAP 6 on admission  Albumin 1.2  Chest x-ray left lower lobe atelectasis likely with chronic interstitial lung disease pattern noted  UA > 100 WBCs with large leukocyte Estrace and positive nitrates  Urine and blood culture pending  TSH 0.24     Status post 2 L normal saline bolus in ED   s/p bicarb drip. Acidosis resolved. DC IV fluid  S/p 1 g calcium in ED. Hypocalcemia resolved. Will complete a course of ceftriaxone for UTI today. Urine culture growing gram-negative rods, will follow  Blood cultures negative to date  Inpatient PT OT eval for DC planning     Sinus bradycardia  -Heart rate in 40-50s  -Hold Aricept    Hypothyroidism  CAD status post stent  TSH 0.24. Free T4 1.6. Reduce Synthroid dose to 75 mcg  Continue isosorbide twice daily as tolerated  Hold home amlodipine,  Lisinopril  Continue home nitroglycerin as needed, statin     Glaucoma  Continue home eyedrops     CLL  Squamous cell carcinoma of the scalp  Continue wound care     Dementia  Continue Aricept  Plan is to get back to memory care unit at [facility] on discharge per son          25.0 - 29.9 Overweight / Body mass index is 28.62 kg/m².     Estimated discharge date:  Barriers:      Code Status: Full code per wishes of the patient in ED, confirmed by son  Surrogate Decision Maker: Miguel Angel Cat #[phone]     DVT Prophylaxis: Subcu heparin  GI Prophylaxis: not indicated     Baseline: Patient is currently living at memory care unit at [facility] due to progressive worsening dementia     Subjective: Chief Complaint / Reason for Physician Visit  Discussed with RN events overnight. No acute events overnight  No complaints  States he feels much better    Review of Systems:  Symptom Y/N Comments  Symptom Y/N Comments   Fever/Chills    Chest Pain     Poor Appetite    Edema     Cough    Abdominal Pain     Sputum    Joint Pain     SOB/THOMSON    Pruritis/Rash     Nausea/vomit    Tolerating PT/OT     Diarrhea    Tolerating Diet     Constipation    Other       Could NOT obtain due to:      Objective:     VITALS:   Last 24hrs VS reviewed since prior progress note. Most recent are:  Patient Vitals for the past 24 hrs:   Temp Pulse Resp BP SpO2   11/29/21 0248 97.4 °F (36.3 °C) (!) 52 18 120/62 97 %   11/29/21 0022 97.8 °F (36.6 °C) (!) 52 18 (!) 120/58 94 %   11/28/21 2000 98.7 °F (37.1 °C) (!) 56 18 (!) 110/57 96 %   11/28/21 1612 97.8 °F (36.6 °C) (!) 59 16 130/75 96 %   11/28/21 1200  (!) 55      11/28/21 1151 97.4 °F (36.3 °C) (!) 58 16 125/65 95 %       Intake/Output Summary (Last 24 hours) at 11/29/2021 8988  Last data filed at 11/28/2021 1313  Gross per 24 hour   Intake 1980 ml   Output    Net 1980 ml        I had a face to face encounter and independently examined this patient on 11/29/2021, as outlined below:  PHYSICAL EXAM:  General: WD, WN. Alert, cooperative, no acute distress    EENT:  EOMI. Anicteric sclerae. MMM  Resp:  CTA bilaterally, no wheezing or rales. No accessory muscle use  CV:  Regular  rhythm,  No edema  GI:  Soft, Non distended, Non tender. +Bowel sounds  Neurologic:  Alert and oriented X 3, normal speech,   Psych:   Good insight. Not anxious nor agitated  Skin:  No rashes. No jaundice.   Left parietal scalp indentation and superficial wound    Reviewed most current lab test results and cultures  YES  Reviewed most current radiology test results   YES  Review and summation of old records today    NO  Reviewed patient's current orders and MAR    YES  PMH/SH reviewed - no change compared to H&P  ________________________________________________________________________  Care Plan discussed with:    Comments   Patient x    Family      RN     Care Manager     Consultant                        Multidiciplinary team rounds were held today with , nursing, pharmacist and clinical coordinator. Patient's plan of care was discussed; medications were reviewed and discharge planning was addressed. ________________________________________________________________________  Total NON critical care TIME:  35   Minutes    Total CRITICAL CARE TIME Spent:   Minutes non procedure based      Comments   >50% of visit spent in counseling and coordination of care     ________________________________________________________________________  Nel Garcia MD     Procedures: see electronic medical records for all procedures/Xrays and details which were not copied into this note but were reviewed prior to creation of Plan. LABS:  I reviewed today's most current labs and imaging studies.   Pertinent labs include:  Recent Labs     11/29/21 0226 11/28/21 0158 11/27/21  1308   WBC 10.2 19.1* 8.8   HGB 12.6 12.8 13.7   HCT 37.7 37.3 41.8    252 288     Recent Labs     11/29/21 0226 11/28/21 0158 11/27/21  1457    139 143   K 4.0 4.5 3.3*    110* 124*   CO2 27 24 13*   GLU 88 145* 65   BUN 14 12 11   CREA 0.92 0.93 0.44*   CA 8.1* 8.2* 5.3*   ALB  --   --  1.2*   TBILI  --   --  0.3   ALT  --   --  9*       Signed: Nel Garcia MD

## 2021-11-30 VITALS
BODY MASS INDEX: 28.66 KG/M2 | TEMPERATURE: 97.2 F | OXYGEN SATURATION: 95 % | SYSTOLIC BLOOD PRESSURE: 107 MMHG | RESPIRATION RATE: 18 BRPM | HEIGHT: 65 IN | DIASTOLIC BLOOD PRESSURE: 54 MMHG | HEART RATE: 51 BPM | WEIGHT: 172 LBS

## 2021-11-30 PROCEDURE — 74011250637 HC RX REV CODE- 250/637: Performed by: INTERNAL MEDICINE

## 2021-11-30 PROCEDURE — 74011250636 HC RX REV CODE- 250/636: Performed by: INTERNAL MEDICINE

## 2021-11-30 PROCEDURE — 74011000258 HC RX REV CODE- 258: Performed by: INTERNAL MEDICINE

## 2021-11-30 PROCEDURE — 74011250637 HC RX REV CODE- 250/637: Performed by: STUDENT IN AN ORGANIZED HEALTH CARE EDUCATION/TRAINING PROGRAM

## 2021-11-30 RX ORDER — LEVOTHYROXINE SODIUM 75 UG/1
75 TABLET ORAL
Qty: 30 TABLET | Refills: 1 | Status: SHIPPED | OUTPATIENT
Start: 2021-12-01

## 2021-11-30 RX ADMIN — FINASTERIDE 5 MG: 5 TABLET, FILM COATED ORAL at 08:57

## 2021-11-30 RX ADMIN — ASPIRIN 81 MG: 81 TABLET, COATED ORAL at 09:03

## 2021-11-30 RX ADMIN — DORZOLAMIDE HYDROCHLORIDE 1 DROP: 20 SOLUTION/ DROPS OPHTHALMIC at 09:06

## 2021-11-30 RX ADMIN — BRIMONIDINE TARTRATE 1 DROP: 2 SOLUTION OPHTHALMIC at 05:19

## 2021-11-30 RX ADMIN — LEVOTHYROXINE SODIUM 75 MCG: 0.07 TABLET ORAL at 08:46

## 2021-11-30 RX ADMIN — EZETIMIBE 10 MG: 10 TABLET ORAL at 09:00

## 2021-11-30 RX ADMIN — ISOSORBIDE MONONITRATE 20 MG: 20 TABLET ORAL at 09:00

## 2021-11-30 RX ADMIN — ENOXAPARIN SODIUM 40 MG: 100 INJECTION SUBCUTANEOUS at 09:03

## 2021-11-30 RX ADMIN — TIMOLOL MALEATE 1 DROP: 5 SOLUTION OPHTHALMIC at 09:06

## 2021-11-30 RX ADMIN — TAMSULOSIN HYDROCHLORIDE 0.4 MG: 0.4 CAPSULE ORAL at 09:03

## 2021-11-30 RX ADMIN — Medication 10 ML: at 05:19

## 2021-11-30 RX ADMIN — CEFTRIAXONE 1 G: 1 INJECTION, POWDER, FOR SOLUTION INTRAMUSCULAR; INTRAVENOUS at 05:19

## 2021-11-30 NOTE — DISCHARGE SUMMARY
Hospitalist Discharge Summary     Patient ID:  Irvin West  [id number]  12 y.o.  1/11/1929 11/27/2021    PCP on record: Donta Wells MD    Admit date: 11/27/2021  Discharge date and time: 11/30/2021    DISCHARGE DIAGNOSIS:    Generalized weakness with hypotension   Due to Klebsiella pneumoniae urinary tract infection    Electrolyte imbalance: Hypokalemia, hypocalcemia  Hyperchloremic non-anion gap metabolic acidosis -resolved  Chronic hypoalbuminemia  Sinus bradycardia  Hypothyroidism  CAD s/p stent  Glaucoma  CLL  Squamous cell carcinoma of the scalp  Dementia    CONSULTATIONS:  None    Excerpted HPI from H&P of Brianda Quan MD:  Umair Israel is a 80 y.o.  male who presents with above complaints from [facility] via EMS with son. Patient was brought in by son from the [facility] care facility after he was found to be severely generalized weak with lethargy when seen by him yesterday. Patient was found to be hypotensive by the nursing staff at the [facility] for which he was sent to the ER for further evaluation. Patient was found to have hypokalemia with hyperchloremic non-anion gap metabolic acidosis with UA positive for nitrite on work-up in ED. Patient had questionable left lower lobe airspace disease likely to be atelectasis on chronic interstitial lung disease on imaging in ED    ______________________________________________________________________  DISCHARGE SUMMARY/HOSPITAL COURSE:  for full details see H&P, daily progress notes, labs, consult notes.        Generalized weakness with hypotension   Due to Klebsiella pneumoniae urinary tract infection    Electrolyte imbalance: Hypokalemia, hypocalcemia  Hyperchloremic non-anion gap metabolic acidosis -resolved  Chronic hypoalbuminemia  Was hypothermic, bradycardic, and hypotensive on admission  Potassium 3.3, corrected calcium 7.3  CO2 13, AGAP 6 on admission  Albumin 1.2  Chest x-ray left lower lobe atelectasis likely with chronic interstitial lung disease pattern noted  UA > 100 WBCs with large leukocyte Estrace and positive nitrates  Urine culture grew Klebsiella pneumonia. Blood culture negative     Status post 2 L normal saline bolus in ED   s/p bicarb drip. Acidosis resolved. DC IV fluid. S/p 1 g calcium in ED. Hypocalcemia resolved. Completed 3 days course of ceftriaxone for UTI  Hypotension resolved. Electrolytes corrected. Patient reports feeling much better. Is discharged back to [facility] in stable condition     Sinus bradycardia  -EKG shows sinus bradycardia  -Heart rate in 40-50s, suspect due to Aricept  -Discontinue Aricept     Hypertension   He is to check his BP at [facility] and resume antihypertensives if BP elevated. Hypothyroidism  CAD status post stent  Hypertension  TSH 0.24. Free T4 1.6. Synthroid dose reduced to 75 mcg  Continue isosorbide twice daily as tolerated  -Pt was hypotensive on admission. His home antihypertensives were held. His BP improved s/p IV fluid resucitation. We will hold his home amlodipine and lisinopril on discharge as he has not required malena BP meds during his 4 days of hospital stay. His blood pressure should be monitored at the nursing home and antihypertensives resumed if blood pressure high  Continue home nitroglycerin as needed, statin     Glaucoma  Continue home eyedrops     CLL  Squamous cell carcinoma of the scalp  Continue wound care     Dementia  Continue Aricept  Discharged back to memory care unit at [facility]       _______________________________________________________________________  Patient seen and examined by me on discharge day. Pertinent Findings:  Gen:    Not in distress  Chest: Clear lungs  CVS:   Regular rhythm.   No edema  Abd:  Soft, not distended, not tender  Neuro:  Alert  _______________________________________________________________________  DISCHARGE MEDICATIONS:   Current Discharge Medication List      CONTINUE these medications which have CHANGED    Details   levothyroxine (SYNTHROID) 75 mcg tablet Take 1 Tablet by mouth Daily (before breakfast). Qty: 30 Tablet, Refills: 1  Start date: 12/1/2021         CONTINUE these medications which have NOT CHANGED    Details   isosorbide mononitrate (ISMO, MONOKET) 20 mg tablet TAKE 1 TABLET BY MOUTH IN THE MORNING AND 1 TABLET BY MOUTH AT MID-DAY  Qty: 60 Tablet, Refills: 2      ezetimibe (ZETIA) 10 mg tablet TAKE 1 TABLET BY MOUTH AT NIGHT  Qty: 30 Tablet, Refills: 5      co-enzyme Q-10 (CO Q-10) 100 mg capsule Take 100 mg by mouth daily. With a meal      amoxicillin-clavulanate (Augmentin) 875-125 mg per tablet Take 1 Tab by mouth every twelve (12) hours. Until complete      cholecalciferol (VITAMIN D3) (5000 Units/125 mcg) tab tablet Take 5,000 Units by mouth daily. aspirin delayed-release 81 mg tablet Take 81 mg by mouth daily. nitroglycerin (NITROSTAT) 0.4 mg SL tablet 1 Tab by SubLINGual route every five (5) minutes as needed for Chest Pain (max 3 tabs daily for chest pain). Up to 3 doses. Qty: 1 Bottle, Refills: 1      timolol (TIMOPTIC) 0.5 % ophthalmic solution Administer 1 Drop to both eyes two (2) times a day. bimatoprost (LUMIGAN) 0.03 % ophthalmic drops Administer 1 Drop to both eyes nightly. acetaminophen (TYLENOL EXTRA STRENGTH) 500 mg tablet Take 500 mg by mouth every six (6) hours as needed for Pain. brinzolamide-brimonidine (SIMBRINZA) 1-0.2 % drps Administer 1 Drop to both eyes two (2) times a day. finasteride (PROSCAR) 5 mg tablet Take 5 mg by mouth daily. silodosin (RAPAFLO) 8 mg capsule Take 8 mg by mouth nightly.          STOP taking these medications       amLODIPine (NORVASC) 5 mg tablet Comments:   Reason for Stopping:         lisinopriL (PRINIVIL, ZESTRIL) 10 mg tablet Comments:   Reason for Stopping:         donepeziL (ARICEPT) 10 mg tablet Comments:   Reason for Stopping: Patient Follow Up Instructions: Activity: Activity as tolerated  Diet: Regular Diet  Wound Care: None needed    Follow-up Information     Follow up With Specialties Details Why Contact Sara Diallo MD Family Medicine Schedule an appointment as soon as possible for a visit in 1 week Contact the office directly to schedule f/u apt within 3-5 days of d/c [address] [facility]  [phone]       [address]  This is your home health agency.  Contact the office directly if you don't hear from them within 24-48 hrs of d/c [facility] [address]   Call As needed [facility]  [phone]        ________________________________________________________________    Risk of deterioration: Low    Condition at Discharge:  Stable  __________________________________________________________________    Disposition  SNF/LTC    ____________________________________________________________________    Code Status: Full Code  ___________________________________________________________________      Total time in minutes spent coordinating this discharge (includes going over instructions, follow-up, prescriptions, and preparing report for sign off to her PCP) :  35 minutes    Signed:  Emmanuelle Jones MD

## 2021-11-30 NOTE — PROGRESS NOTES
Problem: Falls - Risk of  Goal: *Absence of Falls  Description: Document Clarence Liriano Fall Risk and appropriate interventions in the flowsheet. Outcome: Progressing Towards Goal  Note: Fall Risk Interventions:  Mobility Interventions: Bed/chair exit alarm    Mentation Interventions: Bed/chair exit alarm, Reorient patient    Medication Interventions: Assess postural VS orthostatic hypotension, Bed/chair exit alarm, Patient to call before getting OOB    Elimination Interventions: Bed/chair exit alarm, Call light in reach    History of Falls Interventions: Bed/chair exit alarm         Problem: Patient Education: Go to Patient Education Activity  Goal: Patient/Family Education  Outcome: Progressing Towards Goal     Problem: Pressure Injury - Risk of  Goal: *Prevention of pressure injury  Description: Document Amilcar Scale and appropriate interventions in the flowsheet.   Outcome: Progressing Towards Goal  Note: Pressure Injury Interventions:  Sensory Interventions: Assess changes in LOC, Check visual cues for pain    Moisture Interventions: Absorbent underpads, Check for incontinence Q2 hours and as needed, Offer toileting Q_hr    Activity Interventions: Increase time out of bed, Pressure redistribution bed/mattress(bed type)    Mobility Interventions: Float heels    Nutrition Interventions: Document food/fluid/supplement intake, Offer support with meals,snacks and hydration    Friction and Shear Interventions: Apply protective barrier, creams and emollients                Problem: Patient Education: Go to Patient Education Activity  Goal: Patient/Family Education  Outcome: Progressing Towards Goal

## 2021-11-30 NOTE — PROGRESS NOTES
No further CM needs identified. CM notified pt's nurse of d/c. Transition of Care Plan:     RUR: 12% - \"low risk\"  Disposition: Return to [facility] with [facility] (RN/PT/OT) & f/u apts   *Report: [phone], [facility]    *Room: [address] [address]Fax: [phone]  Follow up appointments: PCP & specialist as indicated   DME needed: Defer to [facility] for DME needs following d/c  Transportation at Discharge: [facility] transport secured for 1:30 PM; PCS completed, copy on chart   Keys or means to access home: N/A - [facility] resident     IM Medicare Letter: Pt confused; 2nd IM reviewed with pt's son via telephone communication 11/30/21, copy of 2nd IM to be sent with pt at d/c   Is patient a BCPI-A Bundle: N/A  Caregiver Contact: Pt's son Nancy Smiles: [phone])  Discharge Caregiver contacted prior to discharge? Son contacted the day of d/c (11/30/21); son agreeable to the d/c plan   COVID-19 test: Rapid COVID-19 test completed 11/29/21 in anticipation for return to [facility]; results negative     Update - 11:59 AM: Pt's H&P, MAR, d/c summary, and COVID-19 test results successfully faxed to [facility] for reference. RN aware of [facility] transport at 1:30 PM. CM will remain accessible for consult if additional needs arise before pt transitions out of the facility. Update - 10:54 AM: CM re-attempted to make contact with [facility] to discuss pt's transition back to the facility today. CM spoke with staff member Reema Solis) who confirmed the facility can accept pt back today. Wiley Guerra informed of [facility] transport at 1:30 PM. Wiley Guerra requested for pt's d/c summary, MAR, and COVID-19 test results to be faxed to the facility for reference; CM to complete request. Number for report & room assignment reflected in GARCIA RothschildS plan above.      Pt presenting confused; CM deferred to son to review plan for d/c. CM contacted pt's son, reviewed plan for d/c, and informed him of [facility] transport at 1:30 PM; son confirmed being agreeable to the d/c plan. CM inquired if son had any additional questions, concerns, or needs related to the d/c plan; son declined. Medicare pt's son received verbal explanation of 2nd IM letter informing them of the pt's right to appeal the discharge. Son provided verbal consent; copy has been placed on pt bedside chart. Copy of 2nd IM to be sent with pt back to [facility] upon d/c.     Update - 10:28 AM: HANK HH orders uploaded via [employer] to AT  [facility] for reference; agency aware of pt's d/c today. [facility] Home Care's information reflected in AVS for reference. [facility] transport confirmed for 1:30 PM; PCS completed, copy on chart. Initial note: CM reviewed pt's chart and consulted with attending MD prior to moving forward with d/c planning. MD reported pt is medically stable for d/c today. Per chart review, pt is a resident at [address]. Pt was also open to AT [address] [place] [address] for [place] services prior to current admission; CM will upload HANK [place] orders via [employer] to AT [address] [place] [address] for reference. CM contacted [facility] ([phone]) to discuss pt's transition back to the facility today; CM was connected to the memory care director's voicemail. CM left voicemail & requested call back to discuss pt's d/c. Pt's son informed RN yesterday (11/29/21) that pt will need medical transport arranged for d/c. In anticipation for d/c today, CM sent referral via [employer] to [facility] requesting transport at 1:30 PM; referral currently pending. CM will report confirmed pick-up time once available; PCS to be completed & placed on chart. CM will coordinate updates and review plan for d/c with pt and son Spencer Jdoi). CM will continue to follow & remain accessible for d/c planning. Care Management Interventions  PCP Verified by CM:  Yes  Palliative Care Criteria Met (RRAT>21 & CHF Dx)?: No  Mode of Transport at Discharge: [facility] Time of Discharge: 1330  Transition of Care Consult (CM Consult): Home Health, [address]: No  Reason Outside [place]: Patient already serviced by other home care/hospice agency  Yane Signup:  (Defer to [facility] for DME needs)  Discharge Durable Medical Equipment: No  Physical Therapy Consult: Yes  Occupational Therapy Consult: Yes  Speech Therapy Consult: No  Support Systems: Child(shonna), Assisted Living  Confirm Follow Up Transport: Family  The Plan for Transition of Care is Related to the Following Treatment Goals : [address]  The Patient and/or Patient Representative was Provided with a Choice of Provider and Agrees with the Discharge Plan?: Yes  Name of the Patient Representative Who was Provided with a Choice of Provider and Agrees with the Discharge Plan: patient's son (David Doll)  Gary of Choice List was Provided with Basic Dialogue that Supports the Patient's Individualized Plan of Care/Goals, Treatment Preferences and Shares the Quality Data Associated with the Providers?: Yes  [place] Resource Information Provided?: No  Discharge Location  Discharge Placement: Assisted Living (Return to [facility] with [facility] (RN/PT/OT) & f/u apts)    Kenneth Quintanilla, [facility], [facility]

## 2021-11-30 NOTE — PROGRESS NOTES
Problem: Falls - Risk of  Goal: *Absence of Falls  Description: Document Betty Lin Fall Risk and appropriate interventions in the flowsheet.   Outcome: Progressing Towards Goal  Note: Fall Risk Interventions:  Mobility Interventions: Bed/chair exit alarm    Mentation Interventions: Reorient patient    Medication Interventions: Bed/chair exit alarm    Elimination Interventions: Bed/chair exit alarm    History of Falls Interventions: Bed/chair exit alarm, Investigate reason for fall         Problem: Patient Education: Go to Patient Education Activity  Goal: Patient/Family Education  Outcome: Progressing Towards Goal

## 2021-12-01 ENCOUNTER — PATIENT OUTREACH (OUTPATIENT)
Dept: CASE MANAGEMENT | Age: 86
End: 2021-12-01

## 2021-12-02 LAB
BACTERIA SPEC CULT: NORMAL
SERVICE CMNT-IMP: NORMAL

## 2021-12-03 NOTE — PROGRESS NOTES
Physician Progress Note      PATIENT:               Anne Caceres  CSN #:                  [id number]  :                       1929  ADMIT DATE:       2021 12:38 PM  [address] [place] DATE:        2021 2:29 PM  RESPONDING  PROVIDER #:        Melia Griggs MD          QUERY TEXT:    Patient admitted with UTI. Documentation reflects Sepsis in note  PN. Please clarify the following: The medical record reflects the following:  Risk Factors: UTI    Clinical Indicators:  Temp: 96.6 on admission  WBC: 19.1   HR: 43, 45, 53  BP: 9646, 90/51     PN  Sepsis added to problem list    Treatment: Antibiotics, Fluid bolus 2L, monitoring  Options provided:  -- Sepsis POA confirmed after study  -- Sepsis ruled out after study  -- Other - I will add my own diagnosis  -- Disagree - Not applicable / Not valid  -- Disagree - Clinically unable to determine / Unknown  -- Refer to Clinical Documentation Reviewer    PROVIDER RESPONSE TEXT:    Sepsis POA confirmed after study.     Query created by: David Parks on 2021 11:12 AM      Electronically signed by:  Melia Griggs MD 12/3/2021 7:00 AM

## 2021-12-10 ENCOUNTER — OFFICE VISIT (OUTPATIENT)
Dept: CARDIOLOGY CLINIC | Age: 86
End: 2021-12-10
Payer: MEDICARE

## 2021-12-10 VITALS
BODY MASS INDEX: 27.99 KG/M2 | RESPIRATION RATE: 18 BRPM | WEIGHT: 168 LBS | DIASTOLIC BLOOD PRESSURE: 56 MMHG | SYSTOLIC BLOOD PRESSURE: 122 MMHG | HEART RATE: 60 BPM | OXYGEN SATURATION: 97 % | HEIGHT: 65 IN

## 2021-12-10 DIAGNOSIS — Z98.61 POSTSURGICAL PERCUTANEOUS TRANSLUMINAL CORONARY ANGIOPLASTY STATUS: ICD-10-CM

## 2021-12-10 DIAGNOSIS — E78.2 MIXED HYPERLIPIDEMIA: ICD-10-CM

## 2021-12-10 DIAGNOSIS — I10 ESSENTIAL HYPERTENSION, BENIGN: ICD-10-CM

## 2021-12-10 DIAGNOSIS — I25.10 ATHEROSCLEROSIS OF NATIVE CORONARY ARTERY OF NATIVE HEART WITHOUT ANGINA PECTORIS: Primary | ICD-10-CM

## 2021-12-10 PROCEDURE — 1111F DSCHRG MED/CURRENT MED MERGE: CPT | Performed by: NURSE PRACTITIONER

## 2021-12-10 PROCEDURE — G8536 NO DOC ELDER MAL SCRN: HCPCS | Performed by: NURSE PRACTITIONER

## 2021-12-10 PROCEDURE — 93010 ELECTROCARDIOGRAM REPORT: CPT | Performed by: NURSE PRACTITIONER

## 2021-12-10 PROCEDURE — G8427 DOCREV CUR MEDS BY ELIG CLIN: HCPCS | Performed by: NURSE PRACTITIONER

## 2021-12-10 PROCEDURE — 1101F PT FALLS ASSESS-DOCD LE1/YR: CPT | Performed by: NURSE PRACTITIONER

## 2021-12-10 PROCEDURE — G8419 CALC BMI OUT NRM PARAM NOF/U: HCPCS | Performed by: NURSE PRACTITIONER

## 2021-12-10 PROCEDURE — 93005 ELECTROCARDIOGRAM TRACING: CPT | Performed by: NURSE PRACTITIONER

## 2021-12-10 PROCEDURE — 99215 OFFICE O/P EST HI 40 MIN: CPT | Performed by: NURSE PRACTITIONER

## 2021-12-10 PROCEDURE — G0463 HOSPITAL OUTPT CLINIC VISIT: HCPCS | Performed by: NURSE PRACTITIONER

## 2021-12-10 PROCEDURE — G8432 DEP SCR NOT DOC, RNG: HCPCS | Performed by: NURSE PRACTITIONER

## 2021-12-10 RX ORDER — ISOSORBIDE MONONITRATE 20 MG/1
TABLET ORAL
Qty: 30 TABLET | Refills: 5 | Status: SHIPPED | OUTPATIENT
Start: 2021-12-10

## 2021-12-10 NOTE — PROGRESS NOTES
Ferny aCrdoza NP          NAME:  Kunal Harrington   :   1929   MRN:   [id number]   PCP:  Karol Shepherd MD           Subjective: The patient is a 80y.o. year old male is here today for a follow-up appt. Last seen 20. He was admitted tot [place] [facility] to 21 with weakness/hypotension, UTI. EKG showed SB, HR 40-50s, suspected to be d/t Aricept which was d/c. TSH 0.24, synthroid dose reduced. Home Amlodipine and Lisinopril were held at d/c. His son is here today with pt and providing ROS d/t cognitive impairment of his father. He denies noted chest pain, edema,  Shortness of breath, or edema. He denies noted c/o palpitations. He had a lower BP at PCP this week, SBP 80s. He is at [facility] where they are monitoring his BP, but he doesn't have a copy of the readings today. Past Medical History:   Diagnosis Date    Arthritis     SHOULDERS AND SPINE    CAD (coronary artery disease) 1998    MI CARDIAC CATH, STENT X 2    Cancer ([facility] 2008    chronic lymphocytic leukemia    Cancer ([facility]     multiple skin cancers     Chest pain, unspecified     Chronic pain     L 2 LYTIC LESION    Edema     Hyperlipidemia     Hypertension     Ill-defined condition     GLUCOMA    MI (myocardial infarction) ([facility]     Postsurgical percutaneous transluminal coronary angioplasty status 2012    Pre-operative cardiovascular examination     Thyroid disease     HYPO        ICD-10-CM ICD-9-CM    1. Atherosclerosis of native coronary artery of native heart without angina pectoris  I25.10 414.01 CANCELED: AMB POC EKG ROUTINE W/ 12 LEADS, INTER & REP   2. Essential hypertension, benign  I10 401.1 AMB POC EKG ROUTINE W/ 12 LEADS, INTER & REP      CANCELED: AMB POC EKG ROUTINE W/ 12 LEADS, INTER & REP   3. Mixed hyperlipidemia  E78.2 272.2 AMB POC EKG ROUTINE W/ 12 LEADS, INTER & REP   4.  Postsurgical percutaneous transluminal coronary angioplasty status  Z98.61 V45.82       Social History     Tobacco Use    Smoking status: Former Smoker     Packs/day: 0.50     Years: 15.00     Pack years: 7.50     Types: Cigarettes     Quit date: 1974     Years since quittin.9    Smokeless tobacco: Never Used   Substance Use Topics    Alcohol use: No      Family History   Problem Relation Age of Onset    Diabetes Mother     Heart Disease Father     Heart Attack Father 80    Lung Disease Sister         PULMONARY FIBROSIS    Heart Attack Brother     No Known Problems Sister     Anesth Problems Neg Hx         Review of Systems  Cardiovascular: Negative except as noted in HPI      Objective:       Vitals:    12/10/21 1451   BP: (!) 122/56   Pulse: 60   Resp: 18   SpO2: 97%   Weight: 168 lb (76.2 kg)   Height: 5' 5\" (1.651 m)    Body mass index is 27.96 kg/m². General PE  Mental Status - Drowsy. General Appearance - Not in acute distress. Chest and Lung Exam   Inspection: Accessory muscles - No use of accessory muscles in breathing. Auscultation:   Breath sounds: - Normal.    Cardiovascular   Inspection: Jugular vein - Bilateral - Inspection Normal.  Palpation/Percussion:   Apical Impulse: - Normal.  Auscultation: Rhythm - Regular. Heart Sounds - S1 WNL and S2 WNL. No S3 or S4. Murmurs & Other Heart Sounds: Auscultation of the heart reveals - No Murmurs. Peripheral Vascular   Upper Extremity: Inspection - Bilateral - No Cyanotic nailbeds or Digital clubbing. Lower Extremity:   Palpation: Edema - Bilateral - No edema.           Data Review:     EKG -  Sinus  Bradycardia HR 59  Low voltage in precordial leads.    -Poor R-wave progression       Allergies reviewed  Allergies   Allergen Reactions    Statins-Hmg-Coa Reductase Inhibitors Myalgia and Other (comments)     Causes aching, generalized       Medications reviewed  Current Outpatient Medications   Medication Sig    isosorbide mononitrate (ISMO, MONOKET) 20 mg tablet Take 1 tablet in the evening    levothyroxine (SYNTHROID) 75 mcg tablet Take 1 Tablet by mouth Daily (before breakfast).  ezetimibe (ZETIA) 10 mg tablet TAKE 1 TABLET BY MOUTH AT NIGHT    co-enzyme Q-10 (CO Q-10) 100 mg capsule Take 100 mg by mouth daily. With a meal    cholecalciferol (VITAMIN D3) (5000 Units/125 mcg) tab tablet Take 5,000 Units by mouth daily.  aspirin delayed-release 81 mg tablet Take 81 mg by mouth daily.  nitroglycerin (NITROSTAT) 0.4 mg SL tablet 1 Tab by SubLINGual route every five (5) minutes as needed for Chest Pain (max 3 tabs daily for chest pain). Up to 3 doses.  timolol (TIMOPTIC) 0.5 % ophthalmic solution Administer 1 Drop to both eyes two (2) times a day.  bimatoprost (LUMIGAN) 0.03 % ophthalmic drops Administer 1 Drop to both eyes nightly.  acetaminophen (TYLENOL EXTRA STRENGTH) 500 mg tablet Take 500 mg by mouth every six (6) hours as needed for Pain.  brinzolamide-brimonidine (SIMBRINZA) 1-0.2 % drps Administer 1 Drop to both eyes two (2) times a day.  finasteride (PROSCAR) 5 mg tablet Take 5 mg by mouth daily.  silodosin (RAPAFLO) 8 mg capsule Take 8 mg by mouth nightly.  amoxicillin-clavulanate (Augmentin) 875-125 mg per tablet Take 1 Tab by mouth every twelve (12) hours. Until complete (Patient not taking: Reported on 12/10/2021)     No current facility-administered medications for this visit. Assessment:       ICD-10-CM ICD-9-CM    1. Atherosclerosis of native coronary artery of native heart without angina pectoris  I25.10 414.01 CANCELED: AMB POC EKG ROUTINE W/ 12 LEADS, INTER & REP   2. Essential hypertension, benign  I10 401.1 AMB POC EKG ROUTINE W/ 12 LEADS, INTER & REP      CANCELED: AMB POC EKG ROUTINE W/ 12 LEADS, INTER & REP   3. Mixed hyperlipidemia  E78.2 272.2 AMB POC EKG ROUTINE W/ 12 LEADS, INTER & REP   4.  Postsurgical percutaneous transluminal coronary angioplasty status  Z98.61 V45.82         Orders Placed This Encounter    AMB POC EKG ROUTINE W/ 12 LEADS, INTER & REP     Order Specific Question:   Reason for Exam:     Answer:   routine    isosorbide mononitrate (ISMO, MONOKET) 20 mg tablet     Sig: Take 1 tablet in the evening     Dispense:  30 Tablet     Refill:  5       Plan:     Atherosclerosis of the heart:   Remote history of PTCA stenting in 1998  No ischemia on nuclear stress test June 2018. Son reports no recent angina or anginal equivalent symptoms. He was having SB during hospitalization, Aricept was d/c. Today ECG: SB HR 59. Continue ASA, Zetia. HTN:   Trending lower. Reduce Imdur to 20mg qpm. They will monitor at [facility]. Hyperlipidemia:   on Zetia. Unable to tolerate statins d/t myalgias. 4/30/21 LDL 91. HDL 32, trig 116. Lipids and labs managed by PCP. F/U in 1 year with Dr Osmel Duong sooner prn      On this date 12/10/2021  I have spent 54 minutes reviewing previous notes, test results and face to face with the patient discussing the diagnosis and importance of compliance with the treatment plan as well as documenting on the day of the visit.

## 2021-12-10 NOTE — LETTER
12/10/2021    Patient: Meera Castro   YOB: 1929   Date of Visit: 12/10/2021     Alverto Holcomb MD  [address] [place] [address]  [address] [address]  Via Fax: [phone]    Dear Alverto Holcomb MD,      Thank you for referring Mr. Adalberto Tamayo to [facility] for evaluation. My notes for this consultation are attached. If you have questions, please do not hesitate to call me. I look forward to following your patient along with you.       Sincerely,    Yoni Abreu NP

## 2021-12-10 NOTE — PROGRESS NOTES
Chief Complaint   Patient presents with    Hypertension     Annual follow up     Cholesterol Problem   [facility] Follow Up     11/27-11/30/21 - dehydration and UTI     Shortness of Breath     upon walking      1. Have you been to the ER, urgent care clinic since your last visit? Hospitalized since your last visit? Yes see above     2. Have you seen or consulted any other health care providers outside of the [address] [place] [address] since your last visit? Include any pap smears or colon screening.   Yes PCP and Resident at [address] [place] [address]

## 2021-12-17 ENCOUNTER — PATIENT OUTREACH (OUTPATIENT)
Dept: CASE MANAGEMENT | Age: 86
End: 2021-12-17

## 2021-12-17 NOTE — PROGRESS NOTES
Transition of care outreach postponed for 14 days due to patient's discharge to SNF.   D/C to [address] [place] [address] 11/30/21 still admitted f/u 14d

## 2022-01-05 ENCOUNTER — PATIENT OUTREACH (OUTPATIENT)
Dept: CASE MANAGEMENT | Age: 87
End: 2022-01-05

## 2022-03-27 ENCOUNTER — APPOINTMENT (OUTPATIENT)
Dept: CT IMAGING | Age: 87
End: 2022-03-27
Attending: STUDENT IN AN ORGANIZED HEALTH CARE EDUCATION/TRAINING PROGRAM
Payer: MEDICARE

## 2022-03-27 ENCOUNTER — HOSPITAL ENCOUNTER (EMERGENCY)
Age: 87
Discharge: HOME | End: 2022-03-27
Attending: EMERGENCY MEDICINE
Payer: MEDICARE

## 2022-03-27 VITALS
OXYGEN SATURATION: 96 % | SYSTOLIC BLOOD PRESSURE: 125 MMHG | BODY MASS INDEX: 29.99 KG/M2 | DIASTOLIC BLOOD PRESSURE: 89 MMHG | HEIGHT: 65 IN | WEIGHT: 180 LBS | TEMPERATURE: 97.7 F | HEART RATE: 68 BPM | RESPIRATION RATE: 16 BRPM
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
## 2022-03-27 DIAGNOSIS — S09.90XA CLOSED HEAD INJURY, INITIAL ENCOUNTER: ICD-10-CM

## 2022-03-27 LAB
ALBUMIN SERPL-MCNC: 2.7 G/DL (ref 3.5–5)
ALBUMIN/GLOB SERPL: 0.7 {RATIO} (ref 1.1–2.2)
ALP SERPL-CCNC: 74 U/L (ref 45–117)
ALT SERPL-CCNC: 15 U/L (ref 12–78)
ANION GAP SERPL CALC-SCNC: 5 MMOL/L (ref 5–15)
APPEARANCE UR: CLEAR
AST SERPL-CCNC: 20 U/L (ref 15–37)
BACTERIA URNS QL MICRO: NEGATIVE /HPF
BASOPHILS # BLD: 0 K/UL (ref 0–0.1)
BASOPHILS NFR BLD: 0 % (ref 0–1)
BILIRUB SERPL-MCNC: 0.8 MG/DL (ref 0.2–1)
BILIRUB UR QL: NEGATIVE
BUN SERPL-MCNC: 15 MG/DL (ref 6–20)
BUN/CREAT SERPL: 16 (ref 12–20)
CALCIUM SERPL-MCNC: 8.8 MG/DL (ref 8.5–10.1)
CHLORIDE SERPL-SCNC: 110 MMOL/L (ref 97–108)
CO2 SERPL-SCNC: 25 MMOL/L (ref 21–32)
COLOR UR: ABNORMAL
COMMENT, HOLDF: NORMAL
CREAT SERPL-MCNC: 0.95 MG/DL (ref 0.7–1.3)
DIFFERENTIAL METHOD BLD: ABNORMAL
EOSINOPHIL # BLD: 0 K/UL (ref 0–0.4)
EOSINOPHIL NFR BLD: 0 % (ref 0–7)
EPITH CASTS URNS QL MICRO: ABNORMAL /LPF
ERYTHROCYTE [DISTWIDTH] IN BLOOD BY AUTOMATED COUNT: 15.6 % (ref 11.5–14.5)
GLOBULIN SER CALC-MCNC: 3.7 G/DL (ref 2–4)
GLUCOSE SERPL-MCNC: 92 MG/DL (ref 65–100)
GLUCOSE UR STRIP.AUTO-MCNC: NEGATIVE MG/DL
HCT VFR BLD AUTO: 45.9 % (ref 36.6–50.3)
HGB BLD-MCNC: 15.6 G/DL (ref 12.1–17)
HGB UR QL STRIP: ABNORMAL
HYALINE CASTS URNS QL MICRO: ABNORMAL /LPF (ref 0–5)
IMM GRANULOCYTES # BLD AUTO: 0 K/UL (ref 0–0.04)
IMM GRANULOCYTES NFR BLD AUTO: 0 % (ref 0–0.5)
KETONES UR QL STRIP.AUTO: NEGATIVE MG/DL
LEUKOCYTE ESTERASE UR QL STRIP.AUTO: NEGATIVE
LYMPHOCYTES # BLD: 0.9 K/UL (ref 0.8–3.5)
LYMPHOCYTES NFR BLD: 10 % (ref 12–49)
MCH RBC QN AUTO: 29 PG (ref 26–34)
MCHC RBC AUTO-ENTMCNC: 34 G/DL (ref 30–36.5)
MCV RBC AUTO: 85.3 FL (ref 80–99)
MONOCYTES # BLD: 0.8 K/UL (ref 0–1)
MONOCYTES NFR BLD: 8 % (ref 5–13)
NEUTS SEG # BLD: 7.6 K/UL (ref 1.8–8)
NEUTS SEG NFR BLD: 82 % (ref 32–75)
NITRITE UR QL STRIP.AUTO: NEGATIVE
NRBC # BLD: 0 K/UL (ref 0–0.01)
NRBC BLD-RTO: 0 PER 100 WBC
PH UR STRIP: 5.5 [PH] (ref 5–8)
PLATELET # BLD AUTO: 278 K/UL (ref 150–400)
PMV BLD AUTO: 9.2 FL (ref 8.9–12.9)
POTASSIUM SERPL-SCNC: 4.3 MMOL/L (ref 3.5–5.1)
PROT SERPL-MCNC: 6.4 G/DL (ref 6.4–8.2)
PROT UR STRIP-MCNC: NEGATIVE MG/DL
RBC # BLD AUTO: 5.38 M/UL (ref 4.1–5.7)
RBC #/AREA URNS HPF: ABNORMAL /HPF (ref 0–5)
SAMPLES BEING HELD,HOLD: NORMAL
SODIUM SERPL-SCNC: 140 MMOL/L (ref 136–145)
SP GR UR REFRACTOMETRY: 1.02 (ref 1–1.03)
UA: UC IF INDICATED,UAUC: ABNORMAL
UROBILINOGEN UR QL STRIP.AUTO: 1 EU/DL (ref 0.2–1)
WBC # BLD AUTO: 9.3 K/UL (ref 4.1–11.1)
WBC URNS QL MICRO: ABNORMAL /HPF (ref 0–4)

## 2022-03-27 PROCEDURE — 36415 COLL VENOUS BLD VENIPUNCTURE: CPT

## 2022-03-27 PROCEDURE — 81001 URINALYSIS AUTO W/SCOPE: CPT

## 2022-03-27 PROCEDURE — 99284 EMERGENCY DEPT VISIT MOD MDM: CPT

## 2022-03-27 PROCEDURE — 70450 CT HEAD/BRAIN W/O DYE: CPT

## 2022-03-27 PROCEDURE — 85025 COMPLETE CBC W/AUTO DIFF WBC: CPT

## 2022-03-27 PROCEDURE — 80053 COMPREHEN METABOLIC PANEL: CPT

## 2022-03-27 NOTE — ED PROVIDER NOTES
EMERGENCY DEPARTMENT HISTORY AND PHYSICAL EXAM      Date: 3/27/2022  Patient Name: Ish Merida    History of Presenting Illness     Chief Complaint   Patient presents with    Fall     Pt arrived via [facility] from [address] ([facility]) for fall. Found by staff on scene after having unwitnessed fall. Pt has pre existing concave spot on head from skin cancer per EMS that is approximately 0.25 inch in depth at center and 1.5 inch in diameter. Pt has 3 inch long half inch wide rectangular spots of broken skin on head. Unsure if from fall. Pt has hx of dementia and is A&Ox1 at this time.  Head Injury       History Provided By: Patient and EMS    HPI: Ish Merida, 80 y.o. male presents to the ED with cc of fall. 80 YOM with a history of dementia presents after an unwitnessed fall at a memory care unit. History of dementia, patient unable to provide history. Patient states Casey Like are you doing\" during my exam.  He denies any pain. There are no other complaints, changes, or physical findings at this time. PCP: Tristen Bryant MD    No current facility-administered medications on file prior to encounter. Current Outpatient Medications on File Prior to Encounter   Medication Sig Dispense Refill    ezetimibe (ZETIA) 10 mg tablet TAKE 1 TABLET BY MOUTH EVERY DAY IN THE EVENING 90 Tablet 0    isosorbide mononitrate (ISMO, MONOKET) 20 mg tablet Take 1 tablet in the evening 30 Tablet 5    levothyroxine (SYNTHROID) 75 mcg tablet Take 1 Tablet by mouth Daily (before breakfast). 30 Tablet 1    co-enzyme Q-10 (CO Q-10) 100 mg capsule Take 100 mg by mouth daily. With a meal      amoxicillin-clavulanate (Augmentin) 875-125 mg per tablet Take 1 Tab by mouth every twelve (12) hours. Until complete (Patient not taking: Reported on 12/10/2021)      cholecalciferol (VITAMIN D3) (5000 Units/125 mcg) tab tablet Take 5,000 Units by mouth daily.       aspirin delayed-release 81 mg tablet Take 81 mg by mouth daily.  nitroglycerin (NITROSTAT) 0.4 mg SL tablet 1 Tab by SubLINGual route every five (5) minutes as needed for Chest Pain (max 3 tabs daily for chest pain). Up to 3 doses. 1 Bottle 1    timolol (TIMOPTIC) 0.5 % ophthalmic solution Administer 1 Drop to both eyes two (2) times a day.  bimatoprost (LUMIGAN) 0.03 % ophthalmic drops Administer 1 Drop to both eyes nightly.  acetaminophen (TYLENOL EXTRA STRENGTH) 500 mg tablet Take 500 mg by mouth every six (6) hours as needed for Pain.  brinzolamide-brimonidine (SIMBRINZA) 1-0.2 % drps Administer 1 Drop to both eyes two (2) times a day.  finasteride (PROSCAR) 5 mg tablet Take 5 mg by mouth daily.  silodosin (RAPAFLO) 8 mg capsule Take 8 mg by mouth nightly.          Past History     Past Medical History:  Past Medical History:   Diagnosis Date    Arthritis     SHOULDERS AND SPINE    CAD (coronary artery disease) 7/1998    [place] CARDIAC CATH, STENT X 2    Cancer ([address] 04/2008    chronic lymphocytic leukemia    Cancer ([address]     multiple skin cancers     Chest pain, unspecified     Chronic pain     L 2 LYTIC LESION    Edema     Hyperlipidemia     Hypertension     Ill-defined condition     GLUCOMA    MI (myocardial infarction) ([address]     Postsurgical percutaneous transluminal coronary angioplasty status 6/4/2012    Pre-operative cardiovascular examination     Thyroid disease     HYPO       Past Surgical History:  Past Surgical History:   Procedure Laterality Date    EGD  6/7/2010         HX ADENOIDECTOMY  1936    HX APPENDECTOMY  1954    HX CATARACT REMOVAL      bilateral    HX ORTHOPAEDIC  4/15/2008    Right hip replacement    HX OTHER SURGICAL  5/2011    Moh's procedure,     HX OTHER SURGICAL      MULTIPLES SKIN CANCER REMOVALS, MOSTLY ON HEAD    HX SKIN BIOPSY  04/27/2018    on scalp     HX TONSILLECTOMY  1936    1936    [place] CARDIAC SURG PROCEDURE UNLIST  7/1998    2 Cardiac stents Family History:  Family History   Problem Relation Age of Onset    Diabetes Mother     Heart Disease Father     Heart Attack Father 80    Lung Disease Sister         PULMONARY FIBROSIS    Heart Attack Brother     No Known Problems Sister     Anesth Problems Neg Hx        Social History:  Social History     Tobacco Use    Smoking status: Former Smoker     Packs/day: 0.50     Years: 15.00     Pack years: 7.50     Types: Cigarettes     Quit date: 1974     Years since quittin.3    Smokeless tobacco: Never Used   Substance Use Topics    Alcohol use: No    Drug use: No       Allergies: Allergies   Allergen Reactions    Statins-Hmg-Coa Reductase Inhibitors Myalgia and Other (comments)     Causes aching, generalized         Review of Systems   Review of Systems   Unable to perform ROS: Dementia       Physical Exam   Physical Exam  Vitals and nursing note reviewed. Constitutional:       Comments: 72-year-old male, resting in bed, no acute distress. Sleeping with blanket over his head. HENT:      Head: Normocephalic. Comments: There is a circular concave depression. There are scattered areas of erythema and scabbed skin. Nose: Nose normal.      Mouth/Throat:      Mouth: Mucous membranes are moist.   Eyes:      Pupils: Pupils are equal, round, and reactive to light. Cardiovascular:      Rate and Rhythm: Normal rate and regular rhythm. Pulses: Normal pulses. Heart sounds: No murmur heard. No friction rub. No gallop. Pulmonary:      Effort: Pulmonary effort is normal.      Breath sounds: Normal breath sounds. No wheezing, rhonchi or rales. Abdominal:      General: Abdomen is flat. There is no distension. Palpations: Abdomen is soft. Tenderness: There is no abdominal tenderness. Musculoskeletal:         General: Normal range of motion. Cervical back: Normal range of motion. No rigidity or tenderness. Right lower leg: No edema.       Left lower leg: No edema. Comments: Stable pelvis, no bony tenderness along upper and lower extremities. Full range of motion of upper and lower extremities. Skin:     General: Skin is warm and dry. Capillary Refill: Capillary refill takes less than 2 seconds. Findings: Rash (Skin changes as above) present. Neurological:      General: No focal deficit present. Mental Status: He is alert. Motor: No weakness. Comments: And O x1   Psychiatric:         Mood and Affect: Mood normal.         Diagnostic Study Results     Labs -     Recent Results (from the past 12 hour(s))   CBC WITH AUTOMATED DIFF    Collection Time: 03/27/22  6:53 PM   Result Value Ref Range    WBC 9.3 4.1 - 11.1 K/uL    RBC 5.38 4.10 - 5.70 M/uL    HGB 15.6 12.1 - 17.0 g/dL    HCT 45.9 36.6 - 50.3 %    MCV 85.3 80.0 - 99.0 FL    MCH 29.0 26.0 - 34.0 PG    MCHC 34.0 30.0 - 36.5 g/dL    RDW 15.6 (H) 11.5 - 14.5 %    PLATELET 900 998 - 797 K/uL    MPV 9.2 8.9 - 12.9 FL    NRBC 0.0 0  WBC    ABSOLUTE NRBC 0.00 0.00 - 0.01 K/uL    NEUTROPHILS 82 (H) 32 - 75 %    LYMPHOCYTES 10 (L) 12 - 49 %    MONOCYTES 8 5 - 13 %    EOSINOPHILS 0 0 - 7 %    BASOPHILS 0 0 - 1 %    IMMATURE GRANULOCYTES 0 0.0 - 0.5 %    ABS. NEUTROPHILS 7.6 1.8 - 8.0 K/UL    ABS. LYMPHOCYTES 0.9 0.8 - 3.5 K/UL    ABS. MONOCYTES 0.8 0.0 - 1.0 K/UL    ABS. EOSINOPHILS 0.0 0.0 - 0.4 K/UL    ABS. BASOPHILS 0.0 0.0 - 0.1 K/UL    ABS. IMM.  GRANS. 0.0 0.00 - 0.04 K/UL    DF AUTOMATED     METABOLIC PANEL, COMPREHENSIVE    Collection Time: 03/27/22  6:53 PM   Result Value Ref Range    Sodium 140 136 - 145 mmol/L    Potassium 4.3 3.5 - 5.1 mmol/L    Chloride 110 (H) 97 - 108 mmol/L    CO2 25 21 - 32 mmol/L    Anion gap 5 5 - 15 mmol/L    Glucose 92 65 - 100 mg/dL    BUN 15 6 - 20 MG/DL    Creatinine 0.95 0.70 - 1.30 MG/DL    BUN/Creatinine ratio 16 12 - 20      GFR est AA >60 >60 ml/min/1.73m2    GFR est non-AA >60 >60 ml/min/1.73m2    Calcium 8.8 8.5 - 10.1 MG/DL Bilirubin, total 0.8 0.2 - 1.0 MG/DL    ALT (SGPT) 15 12 - 78 U/L    AST (SGOT) 20 15 - 37 U/L    Alk. phosphatase 74 45 - 117 U/L    Protein, total 6.4 6.4 - 8.2 g/dL    Albumin 2.7 (L) 3.5 - 5.0 g/dL    Globulin 3.7 2.0 - 4.0 g/dL    A-G Ratio 0.7 (L) 1.1 - 2.2     URINALYSIS W/ REFLEX CULTURE    Collection Time: 03/27/22  6:53 PM    Specimen: Urine   Result Value Ref Range    Color YELLOW/STRAW      Appearance CLEAR CLEAR      Specific gravity 1.018 1.003 - 1.030      pH (UA) 5.5 5.0 - 8.0      Protein Negative NEG mg/dL    Glucose Negative NEG mg/dL    Ketone Negative NEG mg/dL    Bilirubin Negative NEG      Blood SMALL (A) NEG      Urobilinogen 1.0 0.2 - 1.0 EU/dL    Nitrites Negative NEG      Leukocyte Esterase Negative NEG      WBC 0-4 0 - 4 /hpf    RBC 5-10 0 - 5 /hpf    Epithelial cells FEW FEW /lpf    Bacteria Negative NEG /hpf    UA:UC IF INDICATED CULTURE NOT INDICATED BY UA RESULT CNI      Hyaline cast 2-5 0 - 5 /lpf   SAMPLES BEING HELD    Collection Time: 03/27/22  6:53 PM   Result Value Ref Range    SAMPLES BEING HELD 1PST     COMMENT        Add-on orders for these samples will be processed based on acceptable specimen integrity and analyte stability, which may vary by analyte. Radiologic Studies -   CT HEAD WO CONT   Final Result   1. Surgical bony and soft tissue defect overlying the left frontal lobe, likely   from prior reported skin cancer removal.   2.  No acute intracranial abnormality. CT Results  (Last 48 hours)               03/27/22 1845  CT HEAD WO CONT Final result    Impression:  1. Surgical bony and soft tissue defect overlying the left frontal lobe, likely   from prior reported skin cancer removal.   2.  No acute intracranial abnormality. Narrative:  EXAM: CT HEAD WO CONT       INDICATION: fall unwitnessed       COMPARISON: May 2021. CONTRAST: None. TECHNIQUE: Unenhanced CT of the head was performed using 5 mm images.  Brain and   bone windows were generated. Coronal and sagittal reformats. CT dose reduction   was achieved through use of a standardized protocol tailored for this   examination and automatic exposure control for dose modulation. FINDINGS:   The ventricles and sulci are normal in size, shape and configuration. . Mild   periventricular white matter hypodensities indicative of microvascular ischemic   disease. There is no intracranial hemorrhage, extra-axial collection, or mass   effect. The basilar cisterns are open. No CT evidence of acute infarct. The bone windows demonstrate a bony and soft tissue defect overlying the left   frontal lobe. The visualized portions of the paranasal sinuses and mastoid air   cells are clear. CXR Results  (Last 48 hours)    None          Medical Decision Making   I am the first provider for this patient. I reviewed the vital signs, available nursing notes, past medical history, past surgical history, family history and social history. Vital Signs-Reviewed the patient's vital signs. Patient Vitals for the past 12 hrs:   Temp Pulse Resp BP SpO2   03/27/22 2228 97.7 °F (36.5 °C) 68 16 125/89 96 %   03/27/22 1849    (!) 123/97 94 %   03/27/22 1802 98.4 °F (36.9 °C) 73 18 (!) 142/70 95 %     Records Reviewed: Nursing Notes    Provider Notes (Medical Decision Making):     72-year-old male presents to the emergency department after an unwitnessed fall. Vital signs are stable. Appears well. No external signs of trauma. Skin changes appear chronic. Urinalysis and CT checked. No indication for labs, but will follow up labs from triage. ED Course:   Initial assessment performed. The patients presenting problems have been discussed, and they are in agreement with the care plan formulated and outlined with them. I have encouraged them to ask questions as they arise throughout their visit.     ED Course as of 03/28/22 0402   Zora Bray Mar 27, 2022   2101 Labs and urine unremarkable. [MB]      ED Course User Index  [MB] MD Amirah Pan MD      Disposition:    Discharged    DISCHARGE PLAN:  1. Discharge Medication List as of 3/27/2022  9:37 PM        2. Follow-up Information     Follow up With Specialties Details Why Contact Info    Manuel Mcmahon MD Family Medicine In 2 days  [address] [place] [address]  [address] [address]  [phone]      [facility] EMERGENCY DEPT Emergency Medicine  If symptoms worsen [address] [place] [address] Drive  [facility]  [phone]        3. Return to ED if worse     Diagnosis     Clinical Impression:   1. Fall, initial encounter    2. Closed head injury, initial encounter        Attestations:    Amirah Doyle MD    Please note that this dictation was completed with [employer], the computer voice recognition software. Quite often unanticipated grammatical, syntax, homophones, and other interpretive errors are inadvertently transcribed by the computer software. Please disregard these errors. Please excuse any errors that have escaped final proofreading. Thank you.

## 2022-03-28 NOTE — DISCHARGE INSTRUCTIONS
Please follow-up with your primary care doctor, return for worsening symptoms. CT did not show any injuries.

## 2022-10-25 RX ORDER — AMLODIPINE BESYLATE 5 MG/1
TABLET ORAL
Qty: 180 TABLET | Refills: 1 | OUTPATIENT
Start: 2022-10-25

## 2023-02-27 NOTE — PROGRESS NOTES
Problem: Falls - Risk of  Goal: *Absence of Falls  Description: Document Starleen Freeze Fall Risk and appropriate interventions in the flowsheet. Outcome: Progressing Towards Goal  Note: Fall Risk Interventions:  Mobility Interventions: OT consult for ADLs    Mentation Interventions: Bed/chair exit alarm         Elimination Interventions: Call light in reach, Bed/chair exit alarm    History of Falls Interventions: Bed/chair exit alarm         Problem: Patient Education: Go to Patient Education Activity  Goal: Patient/Family Education  Outcome: Progressing Towards Goal     Problem: Pressure Injury - Risk of  Goal: *Prevention of pressure injury  Description: Document Amilcar Scale and appropriate interventions in the flowsheet.   Outcome: Progressing Towards Goal  Note: Pressure Injury Interventions:  Sensory Interventions: Assess changes in LOC    Moisture Interventions: Minimize layers    Activity Interventions: Increase time out of bed    Mobility Interventions: HOB 30 degrees or less    Nutrition Interventions: Document food/fluid/supplement intake                     Problem: Patient Education: Go to Patient Education Activity  Goal: Patient/Family Education  Outcome: Progressing Towards Goal [At Term] : at term [ Section] : by  section [None] : there were no delivery complications [de-identified] : [facility] [de-identified] : delay labor [FreeTextEntry1] : 8 10

## (undated) DEVICE — STERILE POLYISOPRENE POWDER-FREE SURGICAL GLOVES: Brand: PROTEXIS

## (undated) DEVICE — BLADE TNGE REG 6IN WOOD STRL -- CONVERT TO ITEM 153408

## (undated) DEVICE — GAUZE SPONGES,12 PLY: Brand: CURITY

## (undated) DEVICE — KENDALL SCD EXPRESS SLEEVES, KNEE LENGTH, MEDIUM: Brand: KENDALL SCD

## (undated) DEVICE — TRAY PREP DRY W/ PREM GLV 2 APPL 6 SPNG 2 UNDPD 1 OVERWRAP

## (undated) DEVICE — STERILE POLYISOPRENE POWDER-FREE SURGICAL GLOVES WITH EMOLLIENT COATING: Brand: PROTEXIS

## (undated) DEVICE — Device

## (undated) DEVICE — FRAZIER SUCTION INSTRUMENT 7 FR W/CONTROL VENT & OBTURATOR: Brand: FRAZIER

## (undated) DEVICE — SUTURE CHROMIC GUT SZ 4-0 L27IN ABSRB BRN FS-2 L19MM 3/8 635H

## (undated) DEVICE — ROYAL SILK SURGICAL GOWN, XXL: Brand: CONVERTORS

## (undated) DEVICE — INFECTION CONTROL KIT SYS

## (undated) DEVICE — Z DISCONTINUEDSOLUTION PREP 2OZ 10% POVIDONE IOD SCR CAP BTL

## (undated) DEVICE — SOLUTION IV 1000ML 0.9% SOD CHL

## (undated) DEVICE — PACK PROCEDURE SURG ENT CUST

## (undated) DEVICE — TOWEL SURG W17XL27IN STD BLU COT NONFENESTRATED PREWASHED

## (undated) DEVICE — SYR 10ML LUER LOK 1/5ML GRAD --

## (undated) DEVICE — STRETCH BANDAGE ROLL: Brand: DERMACEA

## (undated) DEVICE — 40418 TRENDELENBURG ONE-STEP ARM PROTECTORS LARGE (1 PAIR): Brand: 40418 TRENDELENBURG ONE-STEP ARM PROTECTORS LARGE (1 PAIR)

## (undated) DEVICE — DEVON™ KNEE AND BODY STRAP 60" X 3" (1.5 M X 7.6 CM): Brand: DEVON

## (undated) DEVICE — SPONGE LAP 18X18IN STRL -- 5/PK

## (undated) DEVICE — BASIC PACK: Brand: CONVERTORS

## (undated) DEVICE — MEDI-VAC NON-CONDUCTIVE SUCTION TUBING: Brand: CARDINAL HEALTH

## (undated) DEVICE — DRAPE,REIN 53X77,STERILE: Brand: MEDLINE

## (undated) DEVICE — SURGICAL PROCEDURE PACK BASIN MAJ SET CUST NO CAUT

## (undated) DEVICE — SUT PROL 2-0 30IN CT2 BLU --

## (undated) DEVICE — BIPOLAR FORCEPS CORD: Brand: VALLEYLAB

## (undated) DEVICE — ROCKER SWITCH PENCIL BLADE ELECTRODE, HOLSTER: Brand: EDGE

## (undated) DEVICE — REM POLYHESIVE ADULT PATIENT RETURN ELECTRODE: Brand: VALLEYLAB

## (undated) DEVICE — VASELINE PETROLATUM GAUZE STRIP: Brand: VASELINE

## (undated) DEVICE — INSULATED BLADE ELECTRODE: Brand: EDGE

## (undated) DEVICE — TELFA NON-ADHERENT ABSORBENT DRESSING: Brand: TELFA

## (undated) DEVICE — NEEDLE HYPO 22GA L1.5IN BLK S STL HUB POLYPR SHLD REG BVL

## (undated) DEVICE — NEEDLE HYPO 25GA L1.5IN BLU POLYPR HUB S STL REG BVL STR

## (undated) DEVICE — OCCLUSIVE GAUZE STRIP,3% BISMUTH TRIBROMOPHENATE IN PETROLATUM BLEND: Brand: XEROFORM

## (undated) DEVICE — 1200 GUARD II KIT W/5MM TUBE W/O VAC TUBE: Brand: GUARDIAN

## (undated) DEVICE — SYR 10ML CTRL LR LCK NSAF LF --

## (undated) DEVICE — FLOSEAL MATRIX IS INDICATED IN SURGICAL PROCEDURES (OTHER THAN IN OPHTHALMIC) AS AN ADJUNCT TO HEMOSTASIS WHEN CONTROL OF BLEEDING BY LIGATURE OR CONVENTIONALPROCEDURES IS INEFFECTIVE OR IMPRACTICAL.: Brand: FLOSEAL HEMOSTATIC MATRIX

## (undated) DEVICE — HANDLE LT SNAP ON ULT DURABLE LENS FOR TRUMPF ALC DISPOSABLE

## (undated) DEVICE — SUT PROL 3-0 30IN SH BLU --

## (undated) DEVICE — PACK,BASIC,SIRUS,V: Brand: MEDLINE

## (undated) DEVICE — SURGICAL PROCEDURE KIT CRANIOTOMY

## (undated) DEVICE — GOWN,AURORA,NON-REINFORCED,2XL: Brand: MEDLINE

## (undated) DEVICE — ARGYLE FRAZIER SURGICAL SUCTION INSTRUMENT 10 FR/CH (3.3 MM): Brand: ARGYLE

## (undated) DEVICE — PADDING CAST SPEC 6INX4YD COT --

## (undated) DEVICE — DRAPE,LAPAROTOMY,PED,STERILE: Brand: MEDLINE

## (undated) DEVICE — BANDAGE,GAUZE,BULKEE II,4.5"X4.1YD,STRL: Brand: MEDLINE

## (undated) DEVICE — GOWN,SIRUS,NONRNF,SETINSLV,XL,20/CS: Brand: MEDLINE

## (undated) DEVICE — DISPOSABLE TOURNIQUET CUFF SINGLE BLADDER, DUAL PORT AND QUICK CONNECT CONNECTOR: Brand: COLOR CUFF

## (undated) DEVICE — COTTON BALLS: Brand: DEROYAL

## (undated) DEVICE — GOWN,SIRUS,FABRNF,XL,20/CS: Brand: MEDLINE

## (undated) DEVICE — DERMATOME BLADES: Brand: DERMATOME

## (undated) DEVICE — 3M™ TEGADERM™ TRANSPARENT FILM DRESSING FRAME STYLE, 1626W, 4 IN X 4-3/4 IN (10 CM X 12 CM), 50/CT 4CT/CASE: Brand: 3M™ TEGADERM™

## (undated) DEVICE — SOLUTION IV 250ML 0.9% SOD CHL CLR INJ FLX BG CONT PRT CLSR

## (undated) DEVICE — TOOL 9AC90 LEGEND 9CM 9MM AC: Brand: MIDAS REX

## (undated) DEVICE — SUTURE PLN GUT SZ 4-0 P-3 L18IN ABSRB YELLOWISH TAN L13MM 1644G

## (undated) DEVICE — SUPPORT FACE L FOR 27IN EAR CHEEK CHIN E FOR FACIOPLASTY

## (undated) DEVICE — SUTURE VCRL SZ 4-0 L18IN ABSRB UD L13MM P-3 3/8 CIR PRIM J494H

## (undated) DEVICE — INTENDED FOR TISSUE SEPARATION, AND OTHER PROCEDURES THAT REQUIRE A SHARP SURGICAL BLADE TO PUNCTURE OR CUT.: Brand: BARD-PARKER ® CARBON RIB-BACK BLADES

## (undated) DEVICE — TOOL 8TD126 LEGEND 8CM 1.2MM 6MM DEPTH: Brand: MIDAS REX ™

## (undated) DEVICE — AGENT HEMSTAT W2XL14IN OXIDIZED REGENERATED CELOS ABSRB FOR

## (undated) DEVICE — PACK,EENT,TURBAN DRAPE,PK II: Brand: MEDLINE

## (undated) DEVICE — GARMENT,MEDLINE,DVT,INT,CALF,MED, GEN2: Brand: MEDLINE

## (undated) DEVICE — ELECTRO LUBE IS A SINGLE PATIENT USE DEVICE THAT IS INTENDED TO BE USED ON ELECTROSURGICAL ELECTRODES TO REDUCE STICKING.: Brand: KEY SURGICAL ELECTRO LUBE